# Patient Record
Sex: MALE | Race: WHITE | NOT HISPANIC OR LATINO | URBAN - METROPOLITAN AREA
[De-identification: names, ages, dates, MRNs, and addresses within clinical notes are randomized per-mention and may not be internally consistent; named-entity substitution may affect disease eponyms.]

---

## 2019-06-22 ENCOUNTER — INPATIENT (INPATIENT)
Facility: HOSPITAL | Age: 84
LOS: 9 days | Discharge: NURSING, REHAB OR LONG-TERM CARE | End: 2019-07-02
Attending: FAMILY MEDICINE | Admitting: FAMILY MEDICINE
Payer: MEDICARE

## 2019-06-22 VITALS — TEMPERATURE: 103 F

## 2019-06-22 LAB
ALBUMIN SERPL ELPH-MCNC: 3.5 G/DL — SIGNIFICANT CHANGE UP (ref 3.3–5)
ALP SERPL-CCNC: 68 U/L — SIGNIFICANT CHANGE UP (ref 40–120)
ALT FLD-CCNC: 16 U/L — SIGNIFICANT CHANGE UP (ref 12–78)
ANION GAP SERPL CALC-SCNC: 9 MMOL/L — SIGNIFICANT CHANGE UP (ref 5–17)
APPEARANCE UR: CLEAR — SIGNIFICANT CHANGE UP
APTT BLD: 31.3 SEC — SIGNIFICANT CHANGE UP (ref 27.5–36.3)
AST SERPL-CCNC: 18 U/L — SIGNIFICANT CHANGE UP (ref 15–37)
BACTERIA # UR AUTO: ABNORMAL
BASOPHILS # BLD AUTO: 0.01 K/UL — SIGNIFICANT CHANGE UP (ref 0–0.2)
BASOPHILS NFR BLD AUTO: 0.1 % — SIGNIFICANT CHANGE UP (ref 0–2)
BILIRUB SERPL-MCNC: 0.8 MG/DL — SIGNIFICANT CHANGE UP (ref 0.2–1.2)
BILIRUB UR-MCNC: NEGATIVE — SIGNIFICANT CHANGE UP
BUN SERPL-MCNC: 21 MG/DL — SIGNIFICANT CHANGE UP (ref 7–23)
CALCIUM SERPL-MCNC: 9.3 MG/DL — SIGNIFICANT CHANGE UP (ref 8.5–10.1)
CHLORIDE SERPL-SCNC: 103 MMOL/L — SIGNIFICANT CHANGE UP (ref 96–108)
CO2 SERPL-SCNC: 26 MMOL/L — SIGNIFICANT CHANGE UP (ref 22–31)
COLOR SPEC: YELLOW — SIGNIFICANT CHANGE UP
CREAT SERPL-MCNC: 1.5 MG/DL — HIGH (ref 0.5–1.3)
DIFF PNL FLD: ABNORMAL
EOSINOPHIL # BLD AUTO: 0 K/UL — SIGNIFICANT CHANGE UP (ref 0–0.5)
EOSINOPHIL NFR BLD AUTO: 0 % — SIGNIFICANT CHANGE UP (ref 0–6)
EPI CELLS # UR: SIGNIFICANT CHANGE UP
GLUCOSE SERPL-MCNC: 123 MG/DL — HIGH (ref 70–99)
GLUCOSE UR QL: NEGATIVE MG/DL — SIGNIFICANT CHANGE UP
HCT VFR BLD CALC: 45.6 % — SIGNIFICANT CHANGE UP (ref 39–50)
HGB BLD-MCNC: 15.5 G/DL — SIGNIFICANT CHANGE UP (ref 13–17)
IMM GRANULOCYTES NFR BLD AUTO: 0.5 % — SIGNIFICANT CHANGE UP (ref 0–1.5)
INR BLD: 1.3 RATIO — HIGH (ref 0.88–1.16)
KETONES UR-MCNC: ABNORMAL
LACTATE SERPL-SCNC: 1.9 MMOL/L — SIGNIFICANT CHANGE UP (ref 0.7–2)
LEUKOCYTE ESTERASE UR-ACNC: NEGATIVE — SIGNIFICANT CHANGE UP
LYMPHOCYTES # BLD AUTO: 0.56 K/UL — LOW (ref 1–3.3)
LYMPHOCYTES # BLD AUTO: 4.4 % — LOW (ref 13–44)
MCHC RBC-ENTMCNC: 30.4 PG — SIGNIFICANT CHANGE UP (ref 27–34)
MCHC RBC-ENTMCNC: 34 GM/DL — SIGNIFICANT CHANGE UP (ref 32–36)
MCV RBC AUTO: 89.4 FL — SIGNIFICANT CHANGE UP (ref 80–100)
MONOCYTES # BLD AUTO: 0.81 K/UL — SIGNIFICANT CHANGE UP (ref 0–0.9)
MONOCYTES NFR BLD AUTO: 6.3 % — SIGNIFICANT CHANGE UP (ref 2–14)
NEUTROPHILS # BLD AUTO: 11.32 K/UL — HIGH (ref 1.8–7.4)
NEUTROPHILS NFR BLD AUTO: 88.7 % — HIGH (ref 43–77)
NITRITE UR-MCNC: NEGATIVE — SIGNIFICANT CHANGE UP
PH UR: 6 — SIGNIFICANT CHANGE UP (ref 5–8)
PLATELET # BLD AUTO: 207 K/UL — SIGNIFICANT CHANGE UP (ref 150–400)
POTASSIUM SERPL-MCNC: 3.1 MMOL/L — LOW (ref 3.5–5.3)
POTASSIUM SERPL-SCNC: 3.1 MMOL/L — LOW (ref 3.5–5.3)
PROT SERPL-MCNC: 8 GM/DL — SIGNIFICANT CHANGE UP (ref 6–8.3)
PROT UR-MCNC: 30 MG/DL
PROTHROM AB SERPL-ACNC: 14.6 SEC — HIGH (ref 10–12.9)
RBC # BLD: 5.1 M/UL — SIGNIFICANT CHANGE UP (ref 4.2–5.8)
RBC # FLD: 12.8 % — SIGNIFICANT CHANGE UP (ref 10.3–14.5)
RBC CASTS # UR COMP ASSIST: ABNORMAL /HPF (ref 0–4)
SODIUM SERPL-SCNC: 138 MMOL/L — SIGNIFICANT CHANGE UP (ref 135–145)
SP GR SPEC: 1.01 — SIGNIFICANT CHANGE UP (ref 1.01–1.02)
UROBILINOGEN FLD QL: 1 MG/DL
WBC # BLD: 12.76 K/UL — HIGH (ref 3.8–10.5)
WBC # FLD AUTO: 12.76 K/UL — HIGH (ref 3.8–10.5)
WBC UR QL: SIGNIFICANT CHANGE UP

## 2019-06-22 PROCEDURE — 93010 ELECTROCARDIOGRAM REPORT: CPT

## 2019-06-22 PROCEDURE — 74177 CT ABD & PELVIS W/CONTRAST: CPT | Mod: 26

## 2019-06-22 PROCEDURE — 72125 CT NECK SPINE W/O DYE: CPT | Mod: 26

## 2019-06-22 PROCEDURE — 99285 EMERGENCY DEPT VISIT HI MDM: CPT

## 2019-06-22 PROCEDURE — 71260 CT THORAX DX C+: CPT | Mod: 26

## 2019-06-22 PROCEDURE — 70450 CT HEAD/BRAIN W/O DYE: CPT | Mod: 26

## 2019-06-22 RX ORDER — SODIUM CHLORIDE 9 MG/ML
2400 INJECTION, SOLUTION INTRAVENOUS ONCE
Refills: 0 | Status: COMPLETED | OUTPATIENT
Start: 2019-06-22 | End: 2019-06-22

## 2019-06-22 RX ORDER — ACETAMINOPHEN 500 MG
650 TABLET ORAL ONCE
Refills: 0 | Status: COMPLETED | OUTPATIENT
Start: 2019-06-22 | End: 2019-06-22

## 2019-06-22 RX ORDER — VANCOMYCIN HCL 1 G
1000 VIAL (EA) INTRAVENOUS ONCE
Refills: 0 | Status: COMPLETED | OUTPATIENT
Start: 2019-06-22 | End: 2019-06-22

## 2019-06-22 RX ORDER — METOPROLOL TARTRATE 50 MG
50 TABLET ORAL ONCE
Refills: 0 | Status: DISCONTINUED | OUTPATIENT
Start: 2019-06-22 | End: 2019-06-23

## 2019-06-22 RX ORDER — CEFEPIME 1 G/1
2000 INJECTION, POWDER, FOR SOLUTION INTRAMUSCULAR; INTRAVENOUS ONCE
Refills: 0 | Status: COMPLETED | OUTPATIENT
Start: 2019-06-22 | End: 2019-06-22

## 2019-06-22 RX ADMIN — CEFEPIME 100 MILLIGRAM(S): 1 INJECTION, POWDER, FOR SOLUTION INTRAMUSCULAR; INTRAVENOUS at 15:25

## 2019-06-22 RX ADMIN — Medication 650 MILLIGRAM(S): at 14:50

## 2019-06-22 RX ADMIN — SODIUM CHLORIDE 2400 MILLILITER(S): 9 INJECTION, SOLUTION INTRAVENOUS at 15:24

## 2019-06-22 RX ADMIN — Medication 650 MILLIGRAM(S): at 15:36

## 2019-06-22 RX ADMIN — Medication 650 MILLIGRAM(S): at 21:38

## 2019-06-22 RX ADMIN — Medication 250 MILLIGRAM(S): at 16:56

## 2019-06-22 NOTE — ED PROVIDER NOTE - SKIN, MLM
No evidence of rash. +small left forehead sub tissue swelling with overlying superficial abrasions +tactile warmth

## 2019-06-22 NOTE — ED PROVIDER NOTE - MUSCULOSKELETAL, MLM
Spine appears normal, range of motion is not limited, no muscle or joint tenderness, +bilateral SLR 45 degrees without pain, normal motor

## 2019-06-22 NOTE — ED ADULT TRIAGE NOTE - CHIEF COMPLAINT QUOTE
Brought in by EMS for fall last night at 8pm and fall today at 1:30pm with head injury but no LOC. Patient on baby asa. Complaining of dizziness. Recent dx of sinus infection. Patient febrile in triage. trauma alert and code sepsis called in triage.

## 2019-06-22 NOTE — H&P ADULT - HISTORY OF PRESENT ILLNESS
84 y/o male with PMHx of HTN, BPH, diverticulosis, prostate CA, tubular adenoma presents to the ED s/p fall this morning. Per pt's daughter at bedside, pt fell last night and again this morning with +head injury. No LOC. Pt does not remember how he fell but believes he slipped and fell. Pt also states he has been feeling weak and fatigued x3 days, with bifrontal mild headache, diarrhea and decreased appetite/PO. Seen by PMD Dr. Paulino this morning PTA who dx pt with a sinus infection. At ED, rectal temp 103. Denies neck pain, back pain, bilateral lower extremities pain. No hx of renal problems. On 81mg every other day. Former smoker.     Fam Hx:  Father  at 70 , Multiple Myeloma  Mother  at 91, Dementia Pt is an 84 y/o male with PMHx of HTN, BPH, diverticulosis, prostate CA, tubular adenoma who presents to the ED via EMS after a recurrent  fall this afternoon.    Pts reports he fell at 6pm last night and again today at 1pm while he was trying to urinate in the bathroom.  He denies LOC and reports chills, extreme weakness  and fatigue for a few  days, also assoc  bifrontal mild headache, 1-2 loose stool diarrhea and decreased appetite/PO intake. Pt saw Dr. Nik Zimmerman at 10 am this morning ,  and was prescribed cefuroxime for  chest congestion and  a sinus infection.     In the ED, pt was noted to be febrile with a rectal temp 103F.  He  denied neck pain, back pain, bilateral lower extremities pain. He reports 1 week of a non-productive cough.  No SOB or chest pain.  No hx of renal problems.  On 81mg every other day.      In the ED, he received Sepsis antibiotics with 2gm Cefepime and Vanco 1gm,  and 2400 ml of LR, 650mg tylenol.    Fam Hx:  Father  at 70 , Multiple Myeloma  Mother  at 91, Dementia

## 2019-06-22 NOTE — H&P ADULT - NSHPLABSRESULTS_GEN_ALL_CORE
< from: CT Abdomen and Pelvis w/ IV Cont (06.22.19 @ 16:37) >    EXAM:  CT ABDOMEN AND PELVIS IC                          EXAM:  CT CHEST IC                            PROCEDURE DATE:  06/22/2019          INTERPRETATION:  CT CHEST WITH IV CONTRAST, CT ABDOMEN AND PELVIS WITH IV   CONTRAST    CLINICAL INFORMATION: Trauma Alert:  fall on ASA      COMPARISON: None.    PROCEDURE:   CT of the Chest, Abdomen and Pelvis was performed with intravenous   contrast.   Imaging was performed through the chest in the arterial phase followed by   imaging of the abdomen andpelvis in the portal venous phase.  Oral contrast:None.  Sagittal and coronal reformats were performed.    Contrast: 90 cc Omnipaque 350    FINDINGS:    CHEST:     LUNGS, AIRWAYS: The central airways are patent. Posterior right upper   lobe consolidation.  PLEURA: No pleural effusion, hemothorax, or pneumothorax.  VESSELS: No acute aortic injury. Main pulmonary arteries are patent.  HEART: Normal heart size. No pericardial effusion. Coronary artery   calcifications are present.  MEDIASTINUM AND VALERIY: No adenopathy or hematoma.  CHEST WALL: No hematoma.    ABDOMEN AND PELVIS:    LIVER: No hematoma or laceration.  BILE DUCTS: Nondilated.  GALLBLADDER: Normal.  SPLEEN: No hematoma or laceration.  PANCREAS: Normal.  ADRENALS: Normal.  KIDNEYS/URETERS: No hematoma, laceration, or perinephric fluid/fluid   collection. 1.2 cm indeterminate left renal lesion.    BLADDER: Normal.  REPRODUCTIVE ORGANS: Radiation seeds.    BOWEL: No bowel-related abnormality.  PERITONEUM: No free air, ascites, or hemoperitoneum.  VESSELS:  Normal caliber aorta.  RETROPERITONEUM: No adenopathy or hematoma.    ABDOMINAL WALL: No hematoma or contusion.  BONES: No acute skeletal trauma. No displaced rib fracture. No   thoracolumbar spine compression fracture or traumatic malalignment.   Age-indeterminate mild superior endplate compression fracture at T12.    IMPRESSION:     No acute traumatic injury.     Age-indeterminate mild superior endplate compression fracture at T12.    Posterior right upper lobe consolidation, likely pneumonia. Follow-up to   complete resolution.    1.2 cm indeterminate left renal lesion. Follow-up nonemergent MRI abdomen   with and without contrast.    ALFREDITO ESCALANTE   This document has been electronically signed. Jun 22 2019  5:36PM    < end of copied text >

## 2019-06-22 NOTE — H&P ADULT - NSHPPHYSICALEXAM_GEN_ALL_CORE
ICU Vital Signs Last 24 Hrs    T(F): 99.9 (22 Jun 2019 20:35), Max: 103 (22 Jun 2019 14:30)  HR: 84 (22 Jun 2019 20:35) (78 - 94)  BP: 172/87 (22 Jun 2019 20:35) (128/63 - 172/87)    RR: 19 (22 Jun 2019 20:35) (17 - 19)  SpO2: 98% (22 Jun 2019 20:35) (95% - 98%)

## 2019-06-22 NOTE — ED PROVIDER NOTE - CARE PLAN
Principal Discharge DX:	CAP (community acquired pneumonia)  Secondary Diagnosis:	Injury of head, initial encounter  Secondary Diagnosis:	Falls, initial encounter

## 2019-06-22 NOTE — ED PROVIDER NOTE - OBJECTIVE STATEMENT
82 y/o male with PMHx of HTN, BPH, diverticulosis, prostate CA, tubular adenoma presents to the ED s/p fall this morning. Per pt's daughter at bedside, pt fell last night and again this morning with +head injury. No LOC. Pt does not remember how he fell but believes he slipped and fell. Pt also states he has been feeling weak and fatigued x3 days, with bifrontal mild headache, diarrhea and decreased appetite/PO. Seen by PMD Dr. Paulino this morning PTA who dx pt with a sinus infection. At [facility], rectal temp 103. Denies neck pain, back pain, bilateral lower extremities pain. No hx of renal problems. On 81mg every other day. Former smoker. PMD: .

## 2019-06-22 NOTE — ED PROVIDER NOTE - PMH
BPH (Benign Prostatic Hyperplasia)    Deviated nasal septum    Diverticulosis  sigmoid  History of atypical nevus    History of prostate cancer  seed implant 01/2004  HTN (hypertension)    Rhinitis    Sensorineural hearing loss  bilateral  Tubular adenoma

## 2019-06-22 NOTE — H&P ADULT - NSICDXPASTSURGICALHX_GEN_ALL_CORE_FT
PAST SURGICAL HISTORY:  History of rhinoplasty     History of tonsillectomy     Mastoid disorder     Prostate ca with seed implantation 1/2004    S/P cataract surgery left eye

## 2019-06-22 NOTE — H&P ADULT - ASSESSMENT
Pt is admitted w/    Fever 103 F, rectally  RUL Pna  Weakness  Fall  Abd Distension  Overflow Incontinence/Urinary Urgency  - s/p [id number], Vanco 1gm in the ED, cont  - s/p 2400 LR in the ED  - 650mg tylenol  - follow blood and sputum cx  - bedside bladder scan to obtain post-void residual  - ID consult  - DVT prophylaxis : heparin  - IMPROVE VTE Individual Risk Assessment    RISK                                                                Points    [  ] Previous VTE                                                  3    [  ] Thrombophilia                                               2    [  ] Lower limb paralysis                                      2        (unable to hold up >15 seconds)      [  ] Current Cancer                                              2         (within 6 months)    [  ] Immobilization > 24 hrs                                1    [  ] ICU/CCU stay > 24 hours                              1    [  X] Age > 60                                                      1    IMPROVE VTE Score ____1_____    IMPROVE Score 0-1: Low Risk, No VTE prophylaxis required for most patients, encourage ambulation.   IMPROVE Score 2-3: At risk, pharmacologic VTE prophylaxis is indicated for most patients (in the absence of a contraindication)  IMPROVE Score > or = 4: High Risk, pharmacologic VTE prophylaxis is indicated for most patients (in the absence of a contraindication) Pt is admitted w/    Fever 103 F, rectally  RUL Pna  Weakness  Fall  Abd Distension  Overflow Incontinence/Urinary Urgency  1.2 cm indeterminate left renal lesion.  - s/p [id number], Vanco 1gm in the ED, cont with Comm Aqc Pna coverage: rocephin, zithromax  - s/p 2400 LR in the ED  - 650mg tylenol  - follow blood and sputum cx  - bedside bladder scan to obtain post-void residual shows <150ml urine  - ID consult  - 1.2 cm indeterminate left renal lesion on CT abd. Follow-up nonemergent MRI abdomen   with and without contrast.  - DVT prophylaxis : heparin  - IMPROVE VTE Individual Risk Assessment    RISK                                                                Points    [  ] Previous VTE                                                  3    [  ] Thrombophilia                                               2    [  ] Lower limb paralysis                                      2        (unable to hold up >15 seconds)      [  ] Current Cancer                                              2         (within 6 months)    [  ] Immobilization > 24 hrs                                1    [  ] ICU/CCU stay > 24 hours                              1    [  X] Age > 60                                                      1    IMPROVE VTE Score ____1_____    IMPROVE Score 0-1: Low Risk, No VTE prophylaxis required for most patients, encourage ambulation.   IMPROVE Score 2-3: At risk, pharmacologic VTE prophylaxis is indicated for most patients (in the absence of a contraindication)  IMPROVE Score > or = 4: High Risk, pharmacologic VTE prophylaxis is indicated for most patients (in the absence of a contraindication)  - Full Code, pts wife is his HCP

## 2019-06-22 NOTE — ED ADULT NURSE NOTE - NSIMPLEMENTINTERV_GEN_ALL_ED
Implemented All Fall Risk Interventions:  [place] to call system. Call bell, personal items and telephone within reach. Instruct patient to call for assistance. Room bathroom lighting operational. Non-slip footwear when patient is off stretcher. Physically safe environment: no spills, clutter or unnecessary equipment. Stretcher in lowest position, wheels locked, appropriate side rails in place. Provide visual cue, wrist band, yellow gown, etc. Monitor gait and stability. Monitor for mental status changes and reorient to person, place, and time. Review medications for side effects contributing to fall risk. Reinforce activity limits and safety measures with patient and family.

## 2019-06-22 NOTE — ED ADULT NURSE NOTE - OBJECTIVE STATEMENT
Pt presents to ED for multiple fall.s     Brought in by EMS for fall last night at 8pm and fall today at 1:30pm with head injury but no LOC. Patient on baby asa. Complaining of dizziness. Recent dx of sinus infection. Patient febrile in triage. trauma alert and code sepsis called in triage.

## 2019-06-22 NOTE — H&P ADULT - NSICDXPASTMEDICALHX_GEN_ALL_CORE_FT
PAST MEDICAL HISTORY:  BPH (Benign Prostatic Hyperplasia)     Deviated nasal septum     Diverticulosis sigmoid    History of atypical nevus     History of prostate cancer seed implant 01/2004    HTN (hypertension)     Rhinitis     Sensorineural hearing loss bilateral    Tubular adenoma

## 2019-06-22 NOTE — ED PROVIDER NOTE - CLINICAL SUMMARY MEDICAL DECISION MAKING FREE TEXT BOX
82 y/o male +diarrhea past 3+ days, ?sinusitis, BIBA from home s/p fall last night and again this AM. +generalized weakness, +hit head on baby ASA, no LOC. Febrile upon ED arrival. Trauma/sepsis alert initiated: PAN scan, EKG, labs including PAN culture, serum lactate, sepsis IV fluids, IV Vanco/Cefepime, BGM, monitor, observe, reassess.

## 2019-06-22 NOTE — H&P ADULT - NSHPSOCIALHISTORY_GEN_ALL_CORE
No tob/ETOH/drug use. Pt lives with his wife.  No ETOH/drug use.    He is a former 10 pack year smoker, who quit 50 years ago.  Pt is a retired .

## 2019-06-23 LAB
ALBUMIN SERPL ELPH-MCNC: 2.7 G/DL — LOW (ref 3.3–5)
ALP SERPL-CCNC: 49 U/L — SIGNIFICANT CHANGE UP (ref 40–120)
ALT FLD-CCNC: 16 U/L — SIGNIFICANT CHANGE UP (ref 12–78)
ANION GAP SERPL CALC-SCNC: 8 MMOL/L — SIGNIFICANT CHANGE UP (ref 5–17)
AST SERPL-CCNC: 28 U/L — SIGNIFICANT CHANGE UP (ref 15–37)
BILIRUB SERPL-MCNC: 0.5 MG/DL — SIGNIFICANT CHANGE UP (ref 0.2–1.2)
BUN SERPL-MCNC: 25 MG/DL — HIGH (ref 7–23)
CALCIUM SERPL-MCNC: 8.4 MG/DL — LOW (ref 8.5–10.1)
CHLORIDE SERPL-SCNC: 105 MMOL/L — SIGNIFICANT CHANGE UP (ref 96–108)
CO2 SERPL-SCNC: 26 MMOL/L — SIGNIFICANT CHANGE UP (ref 22–31)
CREAT SERPL-MCNC: 1.39 MG/DL — HIGH (ref 0.5–1.3)
CULTURE RESULTS: NO GROWTH — SIGNIFICANT CHANGE UP
GLUCOSE SERPL-MCNC: 110 MG/DL — HIGH (ref 70–99)
HCT VFR BLD CALC: 37.6 % — LOW (ref 39–50)
HGB BLD-MCNC: 12.8 G/DL — LOW (ref 13–17)
MCHC RBC-ENTMCNC: 30.3 PG — SIGNIFICANT CHANGE UP (ref 27–34)
MCHC RBC-ENTMCNC: 34 GM/DL — SIGNIFICANT CHANGE UP (ref 32–36)
MCV RBC AUTO: 89.1 FL — SIGNIFICANT CHANGE UP (ref 80–100)
PLATELET # BLD AUTO: 169 K/UL — SIGNIFICANT CHANGE UP (ref 150–400)
POTASSIUM SERPL-MCNC: 3 MMOL/L — LOW (ref 3.5–5.3)
POTASSIUM SERPL-SCNC: 3 MMOL/L — LOW (ref 3.5–5.3)
PROT SERPL-MCNC: 6.6 GM/DL — SIGNIFICANT CHANGE UP (ref 6–8.3)
RBC # BLD: 4.22 M/UL — SIGNIFICANT CHANGE UP (ref 4.2–5.8)
RBC # FLD: 13.2 % — SIGNIFICANT CHANGE UP (ref 10.3–14.5)
SODIUM SERPL-SCNC: 139 MMOL/L — SIGNIFICANT CHANGE UP (ref 135–145)
SPECIMEN SOURCE: SIGNIFICANT CHANGE UP
WBC # BLD: 9.6 K/UL — SIGNIFICANT CHANGE UP (ref 3.8–10.5)
WBC # FLD AUTO: 9.6 K/UL — SIGNIFICANT CHANGE UP (ref 3.8–10.5)

## 2019-06-23 RX ORDER — ACETAMINOPHEN 500 MG
325 TABLET ORAL ONCE
Refills: 0 | Status: COMPLETED | OUTPATIENT
Start: 2019-06-23 | End: 2019-06-23

## 2019-06-23 RX ORDER — ACETAMINOPHEN 500 MG
325 TABLET ORAL EVERY 4 HOURS
Refills: 0 | Status: DISCONTINUED | OUTPATIENT
Start: 2019-06-23 | End: 2019-06-24

## 2019-06-23 RX ORDER — SODIUM CHLORIDE 9 MG/ML
1000 INJECTION INTRAMUSCULAR; INTRAVENOUS; SUBCUTANEOUS
Refills: 0 | Status: DISCONTINUED | OUTPATIENT
Start: 2019-06-23 | End: 2019-06-24

## 2019-06-23 RX ORDER — METOPROLOL TARTRATE 50 MG
12.5 TABLET ORAL DAILY
Refills: 0 | Status: COMPLETED | OUTPATIENT
Start: 2019-06-23 | End: 2019-06-24

## 2019-06-23 RX ORDER — ACETAMINOPHEN 500 MG
650 TABLET ORAL ONCE
Refills: 0 | Status: COMPLETED | OUTPATIENT
Start: 2019-06-23 | End: 2019-06-23

## 2019-06-23 RX ORDER — METOPROLOL TARTRATE 50 MG
0 TABLET ORAL
Qty: 90 | Refills: 0 | DISCHARGE

## 2019-06-23 RX ORDER — DOXAZOSIN MESYLATE 4 MG
4 TABLET ORAL AT BEDTIME
Refills: 0 | Status: DISCONTINUED | OUTPATIENT
Start: 2019-06-23 | End: 2019-07-02

## 2019-06-23 RX ORDER — AZITHROMYCIN 500 MG/1
500 TABLET, FILM COATED ORAL ONCE
Refills: 0 | Status: COMPLETED | OUTPATIENT
Start: 2019-06-23 | End: 2019-06-23

## 2019-06-23 RX ORDER — CEFTRIAXONE 500 MG/1
1000 INJECTION, POWDER, FOR SOLUTION INTRAMUSCULAR; INTRAVENOUS EVERY 24 HOURS
Refills: 0 | Status: DISCONTINUED | OUTPATIENT
Start: 2019-06-23 | End: 2019-06-26

## 2019-06-23 RX ORDER — CEFTRIAXONE 500 MG/1
1000 INJECTION, POWDER, FOR SOLUTION INTRAMUSCULAR; INTRAVENOUS EVERY 24 HOURS
Refills: 0 | Status: DISCONTINUED | OUTPATIENT
Start: 2019-06-23 | End: 2019-06-23

## 2019-06-23 RX ORDER — DOXAZOSIN MESYLATE 4 MG
0 TABLET ORAL
Qty: 90 | Refills: 0 | DISCHARGE

## 2019-06-23 RX ORDER — AZITHROMYCIN 500 MG/1
500 TABLET, FILM COATED ORAL EVERY 24 HOURS
Refills: 0 | Status: DISCONTINUED | OUTPATIENT
Start: 2019-06-24 | End: 2019-06-27

## 2019-06-23 RX ORDER — HEPARIN SODIUM 5000 [USP'U]/ML
5000 INJECTION INTRAVENOUS; SUBCUTANEOUS EVERY 8 HOURS
Refills: 0 | Status: DISCONTINUED | OUTPATIENT
Start: 2019-06-22 | End: 2019-06-26

## 2019-06-23 RX ORDER — SODIUM CHLORIDE 9 MG/ML
1000 INJECTION INTRAMUSCULAR; INTRAVENOUS; SUBCUTANEOUS
Refills: 0 | Status: DISCONTINUED | OUTPATIENT
Start: 2019-06-22 | End: 2019-06-23

## 2019-06-23 RX ORDER — ASPIRIN/CALCIUM CARB/MAGNESIUM 324 MG
1 TABLET ORAL
Qty: 0 | Refills: 0 | DISCHARGE
Start: 2019-06-23

## 2019-06-23 RX ORDER — ASPIRIN/CALCIUM CARB/MAGNESIUM 324 MG
81 TABLET ORAL DAILY
Refills: 0 | Status: DISCONTINUED | OUTPATIENT
Start: 2019-06-23 | End: 2019-07-02

## 2019-06-23 RX ORDER — AZITHROMYCIN 500 MG/1
TABLET, FILM COATED ORAL
Refills: 0 | Status: DISCONTINUED | OUTPATIENT
Start: 2019-06-23 | End: 2019-06-27

## 2019-06-23 RX ORDER — POTASSIUM CHLORIDE 20 MEQ
40 PACKET (EA) ORAL EVERY 4 HOURS
Refills: 0 | Status: COMPLETED | OUTPATIENT
Start: 2019-06-23 | End: 2019-06-23

## 2019-06-23 RX ADMIN — Medication 325 MILLIGRAM(S): at 22:29

## 2019-06-23 RX ADMIN — AZITHROMYCIN 255 MILLIGRAM(S): 500 TABLET, FILM COATED ORAL at 01:06

## 2019-06-23 RX ADMIN — Medication 4 MILLIGRAM(S): at 21:06

## 2019-06-23 RX ADMIN — CEFTRIAXONE 1000 MILLIGRAM(S): 500 INJECTION, POWDER, FOR SOLUTION INTRAMUSCULAR; INTRAVENOUS at 01:06

## 2019-06-23 RX ADMIN — Medication 12.5 MILLIGRAM(S): at 12:25

## 2019-06-23 RX ADMIN — HEPARIN SODIUM 5000 UNIT(S): 5000 INJECTION INTRAVENOUS; SUBCUTANEOUS at 05:41

## 2019-06-23 RX ADMIN — Medication 40 MILLIEQUIVALENT(S): at 18:16

## 2019-06-23 RX ADMIN — SODIUM CHLORIDE 75 MILLILITER(S): 9 INJECTION INTRAMUSCULAR; INTRAVENOUS; SUBCUTANEOUS at 01:00

## 2019-06-23 RX ADMIN — Medication 81 MILLIGRAM(S): at 12:25

## 2019-06-23 RX ADMIN — HEPARIN SODIUM 5000 UNIT(S): 5000 INJECTION INTRAVENOUS; SUBCUTANEOUS at 12:28

## 2019-06-23 RX ADMIN — HEPARIN SODIUM 5000 UNIT(S): 5000 INJECTION INTRAVENOUS; SUBCUTANEOUS at 21:06

## 2019-06-23 RX ADMIN — Medication 40 MILLIEQUIVALENT(S): at 21:06

## 2019-06-23 RX ADMIN — Medication 325 MILLIGRAM(S): at 18:16

## 2019-06-23 RX ADMIN — Medication 325 MILLIGRAM(S): at 16:03

## 2019-06-23 RX ADMIN — Medication 650 MILLIGRAM(S): at 12:23

## 2019-06-23 RX ADMIN — SODIUM CHLORIDE 75 MILLILITER(S): 9 INJECTION INTRAMUSCULAR; INTRAVENOUS; SUBCUTANEOUS at 12:23

## 2019-06-23 NOTE — CONSULT NOTE ADULT - ASSESSMENT
84 y/o male with h/o HTN, BPH, diverticulosis, prostate CA, tubular adenoma was admitted on 6/22 for increased weakness and recurrent  falls. Pts reports he fell at 6pm the night PTA and again the next day while he was trying to urinate in the bathroom.  He denies LOC, but reports chills, extreme weakness and fatigue for a few  days, also assoc bifrontal mild headache, 1-2 loose stools and decreased appetite/PO intake. Pt saw Dr. Nik Zimmerman earlier on the day of admission and was prescribed cefuroxime for chest congestion and  a sinus infection. In ER he was noted febrile with a rectal temp 103F. He received cefepime and Vanco 1gm, then ceftriaxone and azithromycin.    1. Febrile syndrome. Possible sepsis. RUL pneumonia. CRF stage 3. ?ARF.  -leukocytosis  -obtain BC x 2, sputum c/s  -agree with ceftriaxone 1 gm IV qd and azithromycin 500 mg IV qd  -reason for abx use and side effects reviewed with patient; monitor BMP   -old chart reviewed to assess prior cultures  -monitor temps  -f/u CBC  -supportive care  2. Other issues:   -care per medicine 82 y/o male with h/o HTN, BPH, diverticulosis, prostate CA, tubular adenoma was admitted on 6/22 for increased weakness and recurrent  falls. Pts reports he fell at 6pm the night PTA and again the next day while he was trying to urinate in the bathroom.  He denies LOC, but reports chills, extreme weakness and fatigue for a few  days, also assoc bifrontal mild headache, 1-2 loose stools and decreased appetite/PO intake. Pt saw Dr. Nik Zimmerman earlier on the day of admission and was prescribed cefuroxime for chest congestion and  a sinus infection. In ER he was noted febrile with a rectal temp 103F. He received cefepime and Vanco 1gm, then ceftriaxone and azithromycin.    1. Febrile syndrome. Possible sepsis. RUL pneumonia. CRF stage 3. ?ARF.  -leukocytosis  -obtain BC x 2, sputum c/s  -agree with ceftriaxone 1 gm IV qd and azithromycin 500 mg IV qd  -reason for abx use and side effects reviewed with patient; monitor BMP   -old chart reviewed to assess prior cultures  -hydration  -monitor temps  -f/u CBC  -supportive care  2. Other issues:   -care per medicine

## 2019-06-23 NOTE — PROGRESS NOTE ADULT - ASSESSMENT
82 y/o male with PMHx of HTN, BPH, diverticulosis, prostate CA, tubular adenoma who presents to the ED via EMS after a   fall- preceeding the fall he had been feeling fatigued and weak for a few days and also reported 1-2 loose stools presents with weakness and fall due to pneumonia    # Fever due to  right upper lobe pneumonia ( right upper lobe consolidation)   - chest CT reviewed  - received one dose of vancomycin and cefepime in ED  -received 2400 LR in ED  - Crackles at lung bases  - gentle hydration 75 ml/ hr for 14 hours  - Tylenol for fever prn  - continue ceftriaxone and azithromycin  -f/u sputum and blood c/s    # Hypertension  - DASH diet  - continue toprol 12.5 mg daily       # BIJAL (1.5 )on CKD- baseline unknown  - gentle hydration 75 ml/ hr for 14 hours then stop  - trend serum Cr  - avoid nephrotoxic medications    # compression fracture at T12  -  age indeterminate- neurologically intact and no acute symptoms  - follow up as outpatient       # 1.2 cm indeterminate left renal lesion  - urology consult    # Improve VTE score 1  - encourage ambulati

## 2019-06-24 LAB
ALBUMIN SERPL ELPH-MCNC: 2.7 G/DL — LOW (ref 3.3–5)
ALP SERPL-CCNC: 52 U/L — SIGNIFICANT CHANGE UP (ref 40–120)
ALT FLD-CCNC: 24 U/L — SIGNIFICANT CHANGE UP (ref 12–78)
ANION GAP SERPL CALC-SCNC: 9 MMOL/L — SIGNIFICANT CHANGE UP (ref 5–17)
AST SERPL-CCNC: 47 U/L — HIGH (ref 15–37)
BASOPHILS # BLD AUTO: 0.01 K/UL — SIGNIFICANT CHANGE UP (ref 0–0.2)
BASOPHILS NFR BLD AUTO: 0.1 % — SIGNIFICANT CHANGE UP (ref 0–2)
BILIRUB SERPL-MCNC: 0.6 MG/DL — SIGNIFICANT CHANGE UP (ref 0.2–1.2)
BUN SERPL-MCNC: 20 MG/DL — SIGNIFICANT CHANGE UP (ref 7–23)
CALCIUM SERPL-MCNC: 8.9 MG/DL — SIGNIFICANT CHANGE UP (ref 8.5–10.1)
CHLORIDE SERPL-SCNC: 107 MMOL/L — SIGNIFICANT CHANGE UP (ref 96–108)
CO2 SERPL-SCNC: 25 MMOL/L — SIGNIFICANT CHANGE UP (ref 22–31)
CREAT SERPL-MCNC: 1.18 MG/DL — SIGNIFICANT CHANGE UP (ref 0.5–1.3)
EOSINOPHIL # BLD AUTO: 0 K/UL — SIGNIFICANT CHANGE UP (ref 0–0.5)
EOSINOPHIL NFR BLD AUTO: 0 % — SIGNIFICANT CHANGE UP (ref 0–6)
GLUCOSE SERPL-MCNC: 105 MG/DL — HIGH (ref 70–99)
HCT VFR BLD CALC: 41.3 % — SIGNIFICANT CHANGE UP (ref 39–50)
HGB BLD-MCNC: 13.8 G/DL — SIGNIFICANT CHANGE UP (ref 13–17)
IMM GRANULOCYTES NFR BLD AUTO: 0.4 % — SIGNIFICANT CHANGE UP (ref 0–1.5)
LEGIONELLA AG UR QL: NEGATIVE — SIGNIFICANT CHANGE UP
LYMPHOCYTES # BLD AUTO: 0.64 K/UL — LOW (ref 1–3.3)
LYMPHOCYTES # BLD AUTO: 7.9 % — LOW (ref 13–44)
MCHC RBC-ENTMCNC: 29.9 PG — SIGNIFICANT CHANGE UP (ref 27–34)
MCHC RBC-ENTMCNC: 33.4 GM/DL — SIGNIFICANT CHANGE UP (ref 32–36)
MCV RBC AUTO: 89.4 FL — SIGNIFICANT CHANGE UP (ref 80–100)
MONOCYTES # BLD AUTO: 0.63 K/UL — SIGNIFICANT CHANGE UP (ref 0–0.9)
MONOCYTES NFR BLD AUTO: 7.7 % — SIGNIFICANT CHANGE UP (ref 2–14)
NEUTROPHILS # BLD AUTO: 6.82 K/UL — SIGNIFICANT CHANGE UP (ref 1.8–7.4)
NEUTROPHILS NFR BLD AUTO: 83.9 % — HIGH (ref 43–77)
PLATELET # BLD AUTO: 177 K/UL — SIGNIFICANT CHANGE UP (ref 150–400)
POTASSIUM SERPL-MCNC: 3.5 MMOL/L — SIGNIFICANT CHANGE UP (ref 3.5–5.3)
POTASSIUM SERPL-SCNC: 3.5 MMOL/L — SIGNIFICANT CHANGE UP (ref 3.5–5.3)
PROT SERPL-MCNC: 7.3 GM/DL — SIGNIFICANT CHANGE UP (ref 6–8.3)
RBC # BLD: 4.62 M/UL — SIGNIFICANT CHANGE UP (ref 4.2–5.8)
RBC # FLD: 13.4 % — SIGNIFICANT CHANGE UP (ref 10.3–14.5)
SODIUM SERPL-SCNC: 141 MMOL/L — SIGNIFICANT CHANGE UP (ref 135–145)
WBC # BLD: 8.13 K/UL — SIGNIFICANT CHANGE UP (ref 3.8–10.5)
WBC # FLD AUTO: 8.13 K/UL — SIGNIFICANT CHANGE UP (ref 3.8–10.5)

## 2019-06-24 PROCEDURE — 71045 X-RAY EXAM CHEST 1 VIEW: CPT | Mod: 26

## 2019-06-24 RX ORDER — HYDRALAZINE HCL 50 MG
5 TABLET ORAL ONCE
Refills: 0 | Status: COMPLETED | OUTPATIENT
Start: 2019-06-24 | End: 2019-06-24

## 2019-06-24 RX ORDER — METOPROLOL TARTRATE 50 MG
12.5 TABLET ORAL ONCE
Refills: 0 | Status: COMPLETED | OUTPATIENT
Start: 2019-06-24 | End: 2019-06-24

## 2019-06-24 RX ORDER — HYDRALAZINE HCL 50 MG
5 TABLET ORAL
Refills: 0 | Status: DISCONTINUED | OUTPATIENT
Start: 2019-06-24 | End: 2019-07-01

## 2019-06-24 RX ORDER — ACETAMINOPHEN 500 MG
650 TABLET ORAL ONCE
Refills: 0 | Status: COMPLETED | OUTPATIENT
Start: 2019-06-24 | End: 2019-06-24

## 2019-06-24 RX ORDER — ACETAMINOPHEN 500 MG
650 TABLET ORAL EVERY 6 HOURS
Refills: 0 | Status: DISCONTINUED | OUTPATIENT
Start: 2019-06-24 | End: 2019-07-02

## 2019-06-24 RX ORDER — METOPROLOL TARTRATE 50 MG
25 TABLET ORAL DAILY
Refills: 0 | Status: DISCONTINUED | OUTPATIENT
Start: 2019-06-25 | End: 2019-06-29

## 2019-06-24 RX ORDER — SODIUM CHLORIDE 9 MG/ML
1000 INJECTION INTRAMUSCULAR; INTRAVENOUS; SUBCUTANEOUS
Refills: 0 | Status: DISCONTINUED | OUTPATIENT
Start: 2019-06-24 | End: 2019-06-27

## 2019-06-24 RX ORDER — SODIUM CHLORIDE 9 MG/ML
1000 INJECTION INTRAMUSCULAR; INTRAVENOUS; SUBCUTANEOUS
Refills: 0 | Status: DISCONTINUED | OUTPATIENT
Start: 2019-06-24 | End: 2019-06-24

## 2019-06-24 RX ADMIN — Medication 325 MILLIGRAM(S): at 12:50

## 2019-06-24 RX ADMIN — Medication 12.5 MILLIGRAM(S): at 05:27

## 2019-06-24 RX ADMIN — Medication 650 MILLIGRAM(S): at 16:34

## 2019-06-24 RX ADMIN — HEPARIN SODIUM 5000 UNIT(S): 5000 INJECTION INTRAVENOUS; SUBCUTANEOUS at 05:27

## 2019-06-24 RX ADMIN — AZITHROMYCIN 255 MILLIGRAM(S): 500 TABLET, FILM COATED ORAL at 00:30

## 2019-06-24 RX ADMIN — Medication 81 MILLIGRAM(S): at 12:52

## 2019-06-24 RX ADMIN — Medication 12.5 MILLIGRAM(S): at 08:15

## 2019-06-24 RX ADMIN — AZITHROMYCIN 255 MILLIGRAM(S): 500 TABLET, FILM COATED ORAL at 23:02

## 2019-06-24 RX ADMIN — Medication 4 MILLIGRAM(S): at 21:27

## 2019-06-24 RX ADMIN — Medication 650 MILLIGRAM(S): at 00:54

## 2019-06-24 RX ADMIN — SODIUM CHLORIDE 50 MILLILITER(S): 9 INJECTION INTRAMUSCULAR; INTRAVENOUS; SUBCUTANEOUS at 16:06

## 2019-06-24 RX ADMIN — HEPARIN SODIUM 5000 UNIT(S): 5000 INJECTION INTRAVENOUS; SUBCUTANEOUS at 16:06

## 2019-06-24 RX ADMIN — HEPARIN SODIUM 5000 UNIT(S): 5000 INJECTION INTRAVENOUS; SUBCUTANEOUS at 21:26

## 2019-06-24 RX ADMIN — CEFTRIAXONE 1000 MILLIGRAM(S): 500 INJECTION, POWDER, FOR SOLUTION INTRAMUSCULAR; INTRAVENOUS at 00:30

## 2019-06-24 NOTE — PROGRESS NOTE ADULT - ASSESSMENT
84 y/o male with h/o HTN, BPH, diverticulosis, prostate CA, tubular adenoma was admitted on 6/22 for increased weakness and recurrent  falls. Pts reports he fell at 6pm the night PTA and again the next day while he was trying to urinate in the bathroom.  He denies LOC, but reports chills, extreme weakness and fatigue for a few  days, also assoc bifrontal mild headache, 1-2 loose stools and decreased appetite/PO intake. Pt saw Dr. Nik Zimmerman earlier on the day of admission and was prescribed cefuroxime for chest congestion and  a sinus infection. In ER he was noted febrile with a rectal temp 103F. He received cefepime and Vanco 1gm, then ceftriaxone and azithromycin.    1. Febrile syndrome. Possible sepsis. RUL pneumonia. CRF stage 3. ?ARF.  -leukocytosis is improved  -renal function improving  - BC x 2 reviewed  -on ceftriaxone 1 gm IV qd and azithromycin 500 mg IV qd # 2  -tolerating abx well so far; no side effects noted  -continue abx coverage  -hydration  -monitor temps  -f/u CBC  -supportive care  2. Other issues:   -care per medicine

## 2019-06-24 NOTE — PROGRESS NOTE ADULT - ASSESSMENT
83M w/ PMHx of HTN, prostate cancer, BPH, tubular adenoma, and diverticulosis who presented w/ recurrent falls admitted for sepsis secondary to PNA.      #Sepsis 2/2 PNA   - likely bacterial CAP  - still having fevers  - CT: RUL consolidation  - ID recs appreciated: continue ceftriaxone and azithromycin (D2)  - continue tylenol for fever  - urine culture negative  - blood culture: NGTD  - encourage PO intake  - f/u sputum culture, blood cx, legionella, stool O&P       #HTN  - DASH diet  - increase toprol to home 25 mg daily   - hold valsartan due to BIJAL  - may give hydralazine PRN     #BIJAL  - Cr 1.5 on admission, baseline unknown  - improving s/p IV fluids  - Hold valsartan   - avoid nephrotoxic meds    #1.2cm incidental left renal lesion  - f/u urology     #DVT PPx  - Improve Score 1  - encourage ambulation

## 2019-06-24 NOTE — PHYSICAL THERAPY INITIAL EVALUATION ADULT - PLANNED THERAPY INTERVENTIONS, PT EVAL
gait training/Stair training. chuy Barnett, transfers x 15'./transfer training/bed mobility training

## 2019-06-24 NOTE — PHYSICAL THERAPY INITIAL EVALUATION ADULT - PERTINENT HX OF CURRENT PROBLEM, REHAB EVAL
Pt admitted to  secondary to multiple falls. Pt also reports chills, weakness, fatigue, HA a few days PTA. CT head: neg, CT c-spine: neg for fx, DDD. CT abd/pelvis: no acute injury. Age indeterminate compression fx T12, 1.2 cm left renal lesion.

## 2019-06-24 NOTE — PHYSICAL THERAPY INITIAL EVALUATION ADULT - ACTIVE RANGE OF MOTION EXAMINATION, REHAB EVAL
Right UE Active ROM was WFL (within functional limits)/Right LE Active ROM was WFL (within functional limits)/Left UE Active ROM was WFL (within functional limits)/Left LE Active ROM was WFL (within functional limits)

## 2019-06-24 NOTE — PHYSICAL THERAPY INITIAL EVALUATION ADULT - GENERAL OBSERVATIONS, REHAB EVAL
Pt found supine in bed with cooling blanket and bed alarm activated. Pt with no c/o pain. Pt does c/o chills and feeling cold.

## 2019-06-24 NOTE — CONSULT NOTE ADULT - SUBJECTIVE AND OBJECTIVE BOX
UROLOGY CONSULTATION    DEMETRIUS LINDSEY  [id number]      HPI  Patient is a 83y yo Male who presented for malaise, fevers and found to have pneumonia    Urology consult requested for left renal lesion    Patient seen and examined at bedside.   He has a history of prostate cancer s/p brachytherapy ~2001  Prior care with Dr Prabhjot Rodriguez, has not been seen in a while    Taking flomax for BPH symptoms  Endorses weak stream, incomplete emptying prior to hospitalization    Current complaints: fevers    Denies dysuria, urgency, frequency, gross hematuria, fevers, chills, nausea, vomiting or weight loss    ROS  12 point ROS negative except that outlined in HPI    PMHX  COMMUNITY ACQUIRED, INJURY OF HEAD, FALLS  Diverticulosis  Tubular adenoma  BPH (Benign Prostatic Hyperplasia)  History of prostate cancer  HTN (hypertension)  Sensorineural hearing loss  Rhinitis  Deviated nasal septum  History of atypical nevus  S/P cataract surgery  Prostate ca  Mastoid disorder  History of tonsillectomy  History of rhinoplasty        MEDS  acetaminophen   Tablet .. 650 milliGRAM(s) Oral every 6 hours PRN  aspirin enteric coated 81 milliGRAM(s) Oral daily  azithromycin  IVPB 500 milliGRAM(s) IV Intermittent every 24 hours  azithromycin  IVPB      cefTRIAXone Injectable. 1000 milliGRAM(s) IV Push every 24 hours  doxazosin 4 milliGRAM(s) Oral at bedtime  heparin  Injectable 5000 Unit(s) SubCutaneous every 8 hours  hydrALAZINE Injectable 5 milliGRAM(s) IV Push every 1 hour PRN  metoprolol succinate ER 12.5 milliGRAM(s) Oral daily  sodium chloride 0.9%. 1000 milliLiter(s) IV Continuous <Continuous>      Allergies  adhesives (Rash)  No Known Drug Allergies        VITALS  T(C): 38.1 (06-24-19 @ 12:44), Max: 39.1 (06-23-19 @ 22:31)  HR: 90 (06-24-19 @ 10:38)  BP: 168/81 (06-24-19 @ 10:38)  RR: 18 (06-24-19 @ 10:38)  SpO2: 94% (06-24-19 @ 10:38)    06-23-19 @ 07:01  -  06-24-19 @ 07:00  --------------------------------------------------------  IN: 1200 mL / OUT: 0 mL / NET: 1200 mL    06-24-19 @ 07:01  -  06-24-19 @ 14:42  --------------------------------------------------------  IN: 0 mL / OUT: 200 mL / NET: -200 mL        Gen: elderly Male in NAD, well nourished  HEENT: normocephalic, anicteric sclera, moist mucus membranes; hearing intact  Chest: non labored breathing  Abd: soft, NT, ND, no masses  : no suprapubic distension or tenderness, normal phallus without lesions, bilateral testes without tenderness   Psych: AAOx3, normal affect & mood  Ext: moves all four extremities freely, no peripheral edema    LABS  06-24    141  |  107  |  20  ----------------------------<  105<H>  3.5   |  25  |  1.18    Ca    8.9      24 Jun 2019 08:34    TPro  7.3  /  Alb  2.7<L>  /  TBili  0.6  /  DBili  x   /  AST  47<H>  /  ALT  24  /  AlkPhos  52  06-24    CBC Full  -  ( 24 Jun 2019 08:34 )  WBC Count : 8.13 K/uL  Hemoglobin : 13.8 g/dL  Hematocrit : 41.3 %  Platelet Count - Automated : 177 K/uL  Mean Cell Volume : 89.4 fl  Mean Cell Hemoglobin : 29.9 pg  Mean Cell Hemoglobin Concentration : 33.4 gm/dL  Auto Neutrophil # : 6.82 K/uL  Auto Lymphocyte # : 0.64 K/uL  Auto Monocyte # : 0.63 K/uL  Auto Eosinophil # : 0.00 K/uL  Auto Basophil # : 0.01 K/uL  Auto Neutrophil % : 83.9 %  Auto Lymphocyte % : 7.9 %  Auto Monocyte % : 7.7 %  Auto Eosinophil % : 0.0 %  Auto Basophil % : 0.1 %      UA: 3-5 RBC  ucx: neg      Culture - Blood (collected 22 Jun 2019 14:55)  Source: .Blood Blood-Peripheral  Preliminary Report (23 Jun 2019 20:01):    No growth to date.    Culture - Urine (collected 22 Jun 2019 14:45)  Source: .Urine Clean Catch (Midstream)  Final Report (23 Jun 2019 12:17):    No growth    Culture - Blood (collected 22 Jun 2019 14:45)  Source: .Blood Blood-Peripheral  Preliminary Report (23 Jun 2019 20:01):    No growth to date.        RADIOLOGY  CT A.P: 1.2cm exophytic enhancing left renal lesion, renal cysts      ASSESSMENT & PLAN    Left renal lesion  - discussed with the patient differential diagnosis for solid renal masses. he understands that 80% are malignant however studies have shown that it is safe to monitor masses <2cm without intervention in elderly patients.   - Recommend renal US in 6 mos    Renal cysts  - no surgical intervention necessary    BPH with weak stream, incomplete emptying  - No UTI  - please obtain bladder scan to check PVR  - c/w flomax  - outpatient management  - If PVR>200mL then start fl;omax BID    Prostate cancer  - will check PSA as outpatient    Fevers are not from  source    f.u 2 weeks after discharge    Thank you for allowing me to participate in the care of this patient  Please call if there are questions or concerns    Kacy Hernández MD  [facility]  [phone]    06-24-19 @ 14:42

## 2019-06-25 DIAGNOSIS — J18.9 PNEUMONIA, UNSPECIFIED ORGANISM: ICD-10-CM

## 2019-06-25 DIAGNOSIS — I10 ESSENTIAL (PRIMARY) HYPERTENSION: ICD-10-CM

## 2019-06-25 DIAGNOSIS — R06.02 SHORTNESS OF BREATH: ICD-10-CM

## 2019-06-25 LAB
ANION GAP SERPL CALC-SCNC: 8 MMOL/L — SIGNIFICANT CHANGE UP (ref 5–17)
BUN SERPL-MCNC: 18 MG/DL — SIGNIFICANT CHANGE UP (ref 7–23)
CALCIUM SERPL-MCNC: 8.8 MG/DL — SIGNIFICANT CHANGE UP (ref 8.5–10.1)
CHLORIDE SERPL-SCNC: 111 MMOL/L — HIGH (ref 96–108)
CO2 SERPL-SCNC: 24 MMOL/L — SIGNIFICANT CHANGE UP (ref 22–31)
CREAT SERPL-MCNC: 1.06 MG/DL — SIGNIFICANT CHANGE UP (ref 0.5–1.3)
GLUCOSE SERPL-MCNC: 136 MG/DL — HIGH (ref 70–99)
NT-PROBNP SERPL-SCNC: 1555 PG/ML — HIGH (ref 0–450)
POTASSIUM SERPL-MCNC: 3.2 MMOL/L — LOW (ref 3.5–5.3)
POTASSIUM SERPL-SCNC: 3.2 MMOL/L — LOW (ref 3.5–5.3)
SODIUM SERPL-SCNC: 143 MMOL/L — SIGNIFICANT CHANGE UP (ref 135–145)
TROPONIN I SERPL-MCNC: 0.07 NG/ML — HIGH (ref 0.01–0.04)
TROPONIN I SERPL-MCNC: 0.09 NG/ML — HIGH (ref 0.01–0.04)

## 2019-06-25 PROCEDURE — 93010 ELECTROCARDIOGRAM REPORT: CPT

## 2019-06-25 PROCEDURE — 99223 1ST HOSP IP/OBS HIGH 75: CPT

## 2019-06-25 RX ORDER — VALSARTAN 80 MG/1
160 TABLET ORAL DAILY
Refills: 0 | Status: DISCONTINUED | OUTPATIENT
Start: 2019-06-25 | End: 2019-06-29

## 2019-06-25 RX ORDER — FUROSEMIDE 40 MG
20 TABLET ORAL ONCE
Refills: 0 | Status: COMPLETED | OUTPATIENT
Start: 2019-06-25 | End: 2019-06-25

## 2019-06-25 RX ORDER — POTASSIUM CHLORIDE 20 MEQ
40 PACKET (EA) ORAL ONCE
Refills: 0 | Status: COMPLETED | OUTPATIENT
Start: 2019-06-25 | End: 2019-06-25

## 2019-06-25 RX ADMIN — HEPARIN SODIUM 5000 UNIT(S): 5000 INJECTION INTRAVENOUS; SUBCUTANEOUS at 06:05

## 2019-06-25 RX ADMIN — CEFTRIAXONE 1000 MILLIGRAM(S): 500 INJECTION, POWDER, FOR SOLUTION INTRAMUSCULAR; INTRAVENOUS at 00:44

## 2019-06-25 RX ADMIN — Medication 25 MILLIGRAM(S): at 06:16

## 2019-06-25 RX ADMIN — Medication 4 MILLIGRAM(S): at 22:40

## 2019-06-25 RX ADMIN — Medication 40 MILLIEQUIVALENT(S): at 13:02

## 2019-06-25 RX ADMIN — Medication 81 MILLIGRAM(S): at 11:06

## 2019-06-25 RX ADMIN — HEPARIN SODIUM 5000 UNIT(S): 5000 INJECTION INTRAVENOUS; SUBCUTANEOUS at 22:40

## 2019-06-25 RX ADMIN — Medication 20 MILLIGRAM(S): at 13:02

## 2019-06-25 RX ADMIN — VALSARTAN 160 MILLIGRAM(S): 80 TABLET ORAL at 14:23

## 2019-06-25 RX ADMIN — HEPARIN SODIUM 5000 UNIT(S): 5000 INJECTION INTRAVENOUS; SUBCUTANEOUS at 13:02

## 2019-06-25 NOTE — PROGRESS NOTE ADULT - ATTENDING COMMENTS
on exam- comfortable   -rs-aeeb, crackles present   -p/a-soft, bs+  -cvs-s1s2 normal     #ct abx, supportive care
on exam- comfortable   -rs-aeeb, crackles present at the bases   -p/a-soft, bs+  -cvs-s1s2 normal     #give one dose of lasix stat and monitor him. Start his home meds for bp     #discharge plan
on exam- comfortable   -rs-aeeb, crackles present  -p/a-soft, bs+  -cvs-s1s2 normal       #ct abx, supportive care

## 2019-06-25 NOTE — PROGRESS NOTE ADULT - ASSESSMENT
83M w/ PMHx of HTN, prostate cancer, BPH, tubular adenoma, and diverticulosis who presented w/ recurrent falls admitted for sepsis secondary to PNA.      #Sepsis 2/2 PNA   - likely bacterial CAP  - still having fevers  - CT: RUL consolidation  - ID recs appreciated: continue ceftriaxone and azithromycin (D2)  - continue tylenol for fever  - urine culture negative  - blood culture: NGTD  - stool culture: NGTD  - legionella negative  - encourage PO intake  - f/u sputum culture, blood cx, stool O&P     #Increased dyspnea  - likely PNA with contribution from possible underlying CHF and HTN   - f/u echo   - f/u cardio consult   - control BP   - lasix 20mg IV x 1     #HTN  - DASH diet  - increase toprol to home 25 mg daily   - restart valsartan   - may give hydralazine PRN    #BIJAL  - Cr 1.5 on admission, baseline unknown  - resolved  - may restart valsartan     #1.2cm incidental left renal lesion  - urology recs appreciated: f/u outpatient     #DVT PPx  - Improve Score 1  - encourage ambulation

## 2019-06-25 NOTE — CONSULT NOTE ADULT - SUBJECTIVE AND OBJECTIVE BOX
CHIEF COMPLAINT:    HPI:  Pt is an 82 y/o male with PMHx of HTN, BPH, diverticulosis, prostate CA, tubular adenoma who presents to the ED via EMS after a recurrent  fall on the day of admission .    Pts reports he fell on that day while he was trying to urinate in the bathroom.  He denies LOC and reports chills, extreme weakness  and fatigue for a few  days, also assoc  bifrontal mild headache, 1-2 loose stool diarrhea and decreased appetite/PO intake. Pt saw Dr. Nik Zimmerman at 10 am this morning ,  and was prescribed cefuroxime for  chest congestion and  a sinus infection.     In the ED, pt was noted to be febrile with a rectal temp 103F.  He  denied neck pain, back pain, bilateral lower extremities pain. He reports 1 week of a non-productive cough.  No SOB or chest pain.  No hx of renal problems.  On 81mg every other day.      In the ED, he received Sepsis antibiotics with 2gm Cefepime and Vanco 1gm,  and 2400 ml of LR, 650mg tylenol.  patient was receiving IV fluid while in the hospital , patient did have pneumonia   patient was feeling ok , however felt more sob when he walked to bathroom  which got better with oxygen ,  his blood pressure was elevated ,  his iv fluids was discontinued , received Iv diuretic , which made his symptoms better . patient did have elevated blood pressure while in the hospital ,   called for cardiology consult as patient told he had leaky valve , and possible CHF     Patient denies any hx of MI or chf in the past     Fam Hx:  Father  at 70 , Multiple Myeloma  Mother  at 91, Dementia (2019 20:26)      PAST MEDICAL & SURGICAL HISTORY:  Diverticulosis: sigmoid  Tubular adenoma  BPH (Benign Prostatic Hyperplasia)  History of prostate cancer: seed implant 2004  HTN (hypertension)  Sensorineural hearing loss: bilateral  Rhinitis  Deviated nasal septum  History of atypical nevus  S/P cataract surgery: left eye  Prostate ca: with seed implantation 2004  Mastoid disorder  History of tonsillectomy  History of rhinoplasty      Allergies    adhesives (Rash)  No Known Drug Allergies    Intolerances        SOCIAL HISTORY: former smoker     FAMILY HISTORY:      MEDICATIONS:  MEDICATIONS  (STANDING):  aspirin enteric coated 81 milliGRAM(s) Oral daily  azithromycin  IVPB 500 milliGRAM(s) IV Intermittent every 24 hours  azithromycin  IVPB      cefTRIAXone Injectable. 1000 milliGRAM(s) IV Push every 24 hours  doxazosin 4 milliGRAM(s) Oral at bedtime  heparin  Injectable 5000 Unit(s) SubCutaneous every 8 hours  metoprolol succinate ER 25 milliGRAM(s) Oral daily  sodium chloride 0.9%. 1000 milliLiter(s) (50 mL/Hr) IV Continuous <Continuous>  valsartan 160 milliGRAM(s) Oral daily    MEDICATIONS  (PRN):  acetaminophen   Tablet .. 650 milliGRAM(s) Oral every 6 hours PRN Temp greater or equal to 38C (100.4F)  hydrALAZINE Injectable 5 milliGRAM(s) IV Push every 1 hour PRN SBP > 180      REVIEW OF SYSTEMS:  as above     All other review of systems is negative unless indicated above    Vital Signs Last 24 Hrs  T(C): 36.9 (2019 14:26), Max: 38.5 (2019 16:26)  T(F): 98.5 (2019 14:26), Max: 101.3 (2019 16:26)  HR: 86 (2019 14:26) (72 - 98)  BP: 128/68 (2019 14:26) (128/68 - 186/94)  BP(mean): --  RR: 21 (2019 14:26) (20 - 30)  SpO2: 95% (2019 14:26) (94% - 98%)    I&O's Summary    2019 07:  -  2019 07:00  --------------------------------------------------------  IN: 0 mL / OUT: 560 mL / NET: -560 mL    2019 07:01  -  2019 15:07  --------------------------------------------------------  IN: 0 mL / OUT: 825 mL / NET: -825 mL        PHYSICAL EXAM:    Constitutional: NAD, awake and alert, well-developed  HEENT: PERR, EOMI,  No oral cyananosis.  Neck:  supple,  No JVD  Respiratory: Breath sounds are clear bilaterally, No wheezing, rales or rhonchi  Cardiovascular: S1 and S2, regular rate and rhythm, no Murmurs, gallops or rubs  Gastrointestinal: Bowel Sounds present, soft, nontender.   Extremities: No peripheral edema. No clubbing or cyanosis.  Vascular: 2+ peripheral pulses  Neurological: A/O x 3, no focal deficits  Musculoskeletal: no calf tenderness.  Skin: No rashes.      LABS: All Labs Reviewed:                        13.8   8.13  )-----------( 177      ( 2019 08:34 )             41.3                         12.8   9.60  )-----------( 169      ( 2019 17:00 )             37.6     2019 08:35    143    |  111    |  18     ----------------------------<  136    3.2     |  24     |  1.06   2019 08:34    141    |  107    |  20     ----------------------------<  105    3.5     |  25     |  1.18   2019 17:00    139    |  105    |  25     ----------------------------<  110    3.0     |  26     |  1.39     Ca    8.8        2019 08:35  Ca    8.9        2019 08:34  Ca    8.4        2019 17:00    TPro  7.3    /  Alb  2.7    /  TBili  0.6    /  DBili  x      /  AST  47     /  ALT  24     /  AlkPhos  52     2019 08:34  TPro  6.6    /  Alb  2.7    /  TBili  0.5    /  DBili  x      /  AST  28     /  ALT  16     /  AlkPhos  49     2019 17:00          Blood Culture: Organism --  Gram Stain Blood -- Gram Stain --  Specimen Source .Stool Feces  Culture-Blood --    Organism --  Gram Stain Blood -- Gram Stain --  Specimen Source .Stool Feces  Culture-Blood --    Organism --  Gram Stain Blood -- Gram Stain --  Specimen Source .Blood Blood-Peripheral  Culture-Blood --    Organism --  Gram Stain Blood -- Gram Stain --  Specimen Source .Blood Blood-Peripheral  Culture-Blood --            RADIOLOGY/EKG:< from: 12 Lead ECG (19 @ 14:35) >  Sinus rhythm with Premature atrial complexes  Moderate voltage criteria for LVH, may be normal variant  Nonspecific ST abnormality  Abnormal ECG  No previous ECGs available  Confirmed by MD SHEFFIELD PAUL ([id number]) on 2019 11:43:43 PM    < end of copied text >    < from: Xray Chest 1 View- PORTABLE-Urgent (19 @ 15:05) >  IMPRESSION: Right upper lobe pneumonia, unchanged since 19    < end of copied text >

## 2019-06-26 DIAGNOSIS — I27.20 PULMONARY HYPERTENSION, UNSPECIFIED: ICD-10-CM

## 2019-06-26 DIAGNOSIS — R41.89 OTHER SYMPTOMS AND SIGNS INVOLVING COGNITIVE FUNCTIONS AND AWARENESS: ICD-10-CM

## 2019-06-26 LAB
ANION GAP SERPL CALC-SCNC: 7 MMOL/L — SIGNIFICANT CHANGE UP (ref 5–17)
BASE EXCESS BLDA CALC-SCNC: 0.3 MMOL/L — SIGNIFICANT CHANGE UP (ref -2–2)
BLOOD GAS COMMENTS ARTERIAL: SIGNIFICANT CHANGE UP
BUN SERPL-MCNC: 18 MG/DL — SIGNIFICANT CHANGE UP (ref 7–23)
CALCIUM SERPL-MCNC: 8.9 MG/DL — SIGNIFICANT CHANGE UP (ref 8.5–10.1)
CHLORIDE SERPL-SCNC: 110 MMOL/L — HIGH (ref 96–108)
CO2 SERPL-SCNC: 27 MMOL/L — SIGNIFICANT CHANGE UP (ref 22–31)
CREAT SERPL-MCNC: 1 MG/DL — SIGNIFICANT CHANGE UP (ref 0.5–1.3)
CULTURE RESULTS: SIGNIFICANT CHANGE UP
CULTURE RESULTS: SIGNIFICANT CHANGE UP
GAS PNL BLDA: SIGNIFICANT CHANGE UP
GLUCOSE BLDC GLUCOMTR-MCNC: 168 MG/DL — HIGH (ref 70–99)
GLUCOSE SERPL-MCNC: 101 MG/DL — HIGH (ref 70–99)
HCO3 BLDA-SCNC: 22 MMOL/L — SIGNIFICANT CHANGE UP (ref 21–29)
HCT VFR BLD CALC: 36.8 % — LOW (ref 39–50)
HGB BLD-MCNC: 12.6 G/DL — LOW (ref 13–17)
MAGNESIUM SERPL-MCNC: 2.3 MG/DL — SIGNIFICANT CHANGE UP (ref 1.6–2.6)
MCHC RBC-ENTMCNC: 30.1 PG — SIGNIFICANT CHANGE UP (ref 27–34)
MCHC RBC-ENTMCNC: 34.2 GM/DL — SIGNIFICANT CHANGE UP (ref 32–36)
MCV RBC AUTO: 88 FL — SIGNIFICANT CHANGE UP (ref 80–100)
PCO2 BLDA: 29 MMHG — LOW (ref 32–46)
PH BLDA: 7.5 — HIGH (ref 7.35–7.45)
PLATELET # BLD AUTO: 213 K/UL — SIGNIFICANT CHANGE UP (ref 150–400)
PO2 BLDA: 68 MMHG — LOW (ref 74–108)
POTASSIUM SERPL-MCNC: 3.5 MMOL/L — SIGNIFICANT CHANGE UP (ref 3.5–5.3)
POTASSIUM SERPL-SCNC: 3.5 MMOL/L — SIGNIFICANT CHANGE UP (ref 3.5–5.3)
RBC # BLD: 4.18 M/UL — LOW (ref 4.2–5.8)
RBC # FLD: 13.9 % — SIGNIFICANT CHANGE UP (ref 10.3–14.5)
SAO2 % BLDA: 93 % — SIGNIFICANT CHANGE UP (ref 92–96)
SODIUM SERPL-SCNC: 144 MMOL/L — SIGNIFICANT CHANGE UP (ref 135–145)
SPECIMEN SOURCE: SIGNIFICANT CHANGE UP
SPECIMEN SOURCE: SIGNIFICANT CHANGE UP
TROPONIN I SERPL-MCNC: 0.32 NG/ML — HIGH (ref 0.01–0.04)
WBC # BLD: 11.13 K/UL — HIGH (ref 3.8–10.5)
WBC # FLD AUTO: 11.13 K/UL — HIGH (ref 3.8–10.5)

## 2019-06-26 PROCEDURE — 71275 CT ANGIOGRAPHY CHEST: CPT | Mod: 26

## 2019-06-26 PROCEDURE — 93010 ELECTROCARDIOGRAM REPORT: CPT

## 2019-06-26 PROCEDURE — 99233 SBSQ HOSP IP/OBS HIGH 50: CPT

## 2019-06-26 PROCEDURE — 93306 TTE W/DOPPLER COMPLETE: CPT | Mod: 26

## 2019-06-26 PROCEDURE — 71045 X-RAY EXAM CHEST 1 VIEW: CPT | Mod: 26

## 2019-06-26 RX ORDER — SODIUM CHLORIDE 9 MG/ML
1000 INJECTION INTRAMUSCULAR; INTRAVENOUS; SUBCUTANEOUS
Refills: 0 | Status: DISCONTINUED | OUTPATIENT
Start: 2019-06-26 | End: 2019-06-26

## 2019-06-26 RX ORDER — CEFEPIME 1 G/1
1000 INJECTION, POWDER, FOR SOLUTION INTRAMUSCULAR; INTRAVENOUS ONCE
Refills: 0 | Status: COMPLETED | OUTPATIENT
Start: 2019-06-26 | End: 2019-06-26

## 2019-06-26 RX ORDER — ENOXAPARIN SODIUM 100 MG/ML
80 INJECTION SUBCUTANEOUS EVERY 12 HOURS
Refills: 0 | Status: DISCONTINUED | OUTPATIENT
Start: 2019-06-26 | End: 2019-06-30

## 2019-06-26 RX ORDER — CEFEPIME 1 G/1
INJECTION, POWDER, FOR SOLUTION INTRAMUSCULAR; INTRAVENOUS
Refills: 0 | Status: DISCONTINUED | OUTPATIENT
Start: 2019-06-26 | End: 2019-07-01

## 2019-06-26 RX ORDER — SODIUM CHLORIDE 9 MG/ML
1000 INJECTION, SOLUTION INTRAVENOUS
Refills: 0 | Status: DISCONTINUED | OUTPATIENT
Start: 2019-06-26 | End: 2019-06-27

## 2019-06-26 RX ORDER — CEFEPIME 1 G/1
1000 INJECTION, POWDER, FOR SOLUTION INTRAMUSCULAR; INTRAVENOUS EVERY 12 HOURS
Refills: 0 | Status: DISCONTINUED | OUTPATIENT
Start: 2019-06-27 | End: 2019-07-01

## 2019-06-26 RX ORDER — ENOXAPARIN SODIUM 100 MG/ML
80 INJECTION SUBCUTANEOUS
Refills: 0 | Status: DISCONTINUED | OUTPATIENT
Start: 2019-06-26 | End: 2019-06-26

## 2019-06-26 RX ORDER — FUROSEMIDE 40 MG
20 TABLET ORAL ONCE
Refills: 0 | Status: DISCONTINUED | OUTPATIENT
Start: 2019-06-26 | End: 2019-07-02

## 2019-06-26 RX ORDER — CEFEPIME 1 G/1
INJECTION, POWDER, FOR SOLUTION INTRAMUSCULAR; INTRAVENOUS
Refills: 0 | Status: DISCONTINUED | OUTPATIENT
Start: 2019-06-26 | End: 2019-06-26

## 2019-06-26 RX ADMIN — AZITHROMYCIN 255 MILLIGRAM(S): 500 TABLET, FILM COATED ORAL at 00:54

## 2019-06-26 RX ADMIN — Medication 25 MILLIGRAM(S): at 05:18

## 2019-06-26 RX ADMIN — CEFTRIAXONE 1000 MILLIGRAM(S): 500 INJECTION, POWDER, FOR SOLUTION INTRAMUSCULAR; INTRAVENOUS at 00:55

## 2019-06-26 RX ADMIN — Medication 60 MILLIGRAM(S): at 11:27

## 2019-06-26 RX ADMIN — VALSARTAN 160 MILLIGRAM(S): 80 TABLET ORAL at 05:22

## 2019-06-26 RX ADMIN — SODIUM CHLORIDE 75 MILLILITER(S): 9 INJECTION, SOLUTION INTRAVENOUS at 13:20

## 2019-06-26 RX ADMIN — Medication 81 MILLIGRAM(S): at 13:20

## 2019-06-26 RX ADMIN — HEPARIN SODIUM 5000 UNIT(S): 5000 INJECTION INTRAVENOUS; SUBCUTANEOUS at 05:18

## 2019-06-26 RX ADMIN — Medication 4 MILLIGRAM(S): at 20:39

## 2019-06-26 RX ADMIN — CEFEPIME 1000 MILLIGRAM(S): 1 INJECTION, POWDER, FOR SOLUTION INTRAMUSCULAR; INTRAVENOUS at 23:31

## 2019-06-26 RX ADMIN — ENOXAPARIN SODIUM 80 MILLIGRAM(S): 100 INJECTION SUBCUTANEOUS at 14:32

## 2019-06-26 RX ADMIN — AZITHROMYCIN 255 MILLIGRAM(S): 500 TABLET, FILM COATED ORAL at 23:32

## 2019-06-26 NOTE — PROGRESS NOTE ADULT - ASSESSMENT
A/P: 83 male with B/L PE and hypoxic respiratory failure      Plan:  Patient to be transferred to the SDU  Agree with Lovenox 80mg bid  not a candidate for TPA at this time  Had a Head CT that was negative for bleed after his fall  Continue with supplemental O2 as needed to keep Sat > 92%  B/L LE dopplers pending  D/W the patient his wife, daughter, and pulmonary.

## 2019-06-26 NOTE — PROGRESS NOTE ADULT - ASSESSMENT
83M w/ PMHx of HTN, prostate cancer, BPH, tubular adenoma, and diverticulosis who presented w/ recurrent falls admitted for sepsis secondary to PNA.      #Sepsis 2/2 PNA   - likely bacterial CAP  - still having fevers  - CT: RUL consolidation  - ID recs appreciated: continue ceftriaxone and azithromycin (D4)  - continue tylenol for fever  - urine culture negative  - blood culture: NGTD  - stool culture: NGTD  - legionella negative  - encourage PO intake  - f/u sputum culture, blood cx, stool O&P     #Severe PE  - lovenox 80 mg BID  - possible PE prior to admission with no known risk factors  - possible PE during admission given negative CT w/ contrast (though not PE protocol), but pt was on heparin DVT PPx  - continue to monitor vitals  - continue to monitor on tele     #HTN  - DASH diet  - continue toprol 25 mg daily   - continue valsartan   - may give hydralazine PRN    #1.2cm incidental left renal lesion  - urology recs appreciated: f/u outpatient     #DVT PPx  - Improve Score 1  - encourage ambulation 83M w/ PMHx of HTN, prostate cancer, BPH, tubular adenoma, and diverticulosis who presented w/ recurrent falls admitted for sepsis secondary to PNA.      #Sepsis 2/2 PNA   - likely bacterial CAP  - still having fevers  - CT: RUL consolidation  - ID recs appreciated: continue ceftriaxone and azithromycin (D4)  - continue tylenol for fever  - urine culture negative  - blood culture: NGTD  - stool culture: NGTD  - legionella negative  - encourage PO intake  - f/u sputum culture, blood cx, stool O&P     #Severe PE  - lovenox 80 mg BID  - possible PE prior to admission with no known risk factors  - possible PE during admission given negative CT w/ contrast (though not PE protocol), but pt was on heparin DVT PPx  - continue to monitor vitals  - continue to monitor on tele     #HTN  - DASH diet  - continue toprol 25 mg daily   - continue valsartan   - may give hydralazine PRN    #1.2cm incidental left renal lesion  - urology recs appreciated: f/u outpatient     #DVT PPx  - On therapeutic lovenox

## 2019-06-26 NOTE — CONSULT NOTE ADULT - SUBJECTIVE AND OBJECTIVE BOX
Patient is a 83y old  Male who presents with a chief complaint of Fever  RUL Pna  Weakness  Fall  Abd Distension  Overflow Incontinence/Urinary Urgency (2019 12:30)      HPI:  Pt is an 84 y/o male with PMHx of HTN, BPH, diverticulosis, prostate CA, tubular adenoma who presents to the ED via EMS after a recurrent  fall this afternoon.    Pts reports he fell at 6pm last night and again today at 1pm while he was trying to urinate in the bathroom.  He denies LOC and reports chills, extreme weakness  and fatigue for a few  days, also assoc  bifrontal mild headache, 1-2 loose stool diarrhea and decreased appetite/PO intake. Pt saw Dr. Nik Zimmerman at 10 am this morning ,  and was prescribed cefuroxime for  chest congestion and  a sinus infection.     In the ED, pt was noted to be febrile with a rectal temp 103F.  He  denied neck pain, back pain, bilateral lower extremities pain. He reports 1 week of a non-productive cough.  No SOB or chest pain.  No hx of renal problems.  On 81mg every other day.      In the ED, he received Sepsis antibiotics with 2gm Cefepime and Vanco 1gm,  and 2400 ml of LR, 650mg tylenol.  EMERGENCY consult was called this evening. I spoke with dr James and shira for ICU eval / ABG / EKG and came to see him along with Dr Najera from ICU.  Hx obtained from pt and family as well as RN staff on 3 north at bedside.  In summary pt gibran admitted with DX RUL pneumonia with inital ct chest done with IV contrast but it was not PE protocol followed by episodes of dizziness and worsened hypozxemia and earlier today with pt collapse.  CTA with PE protocol done confirmed bilateral extensive PE and Pulm infarct pattern seen RUL, LLL  pt is tachypneic but no pleuritic pain / no hemoptysis and remains on o2 nc.  now will be moved to ICU step down unit.  lovenox started earlier today  ECHO data noted and discussed  Wife and DTR are at bedside Dr Jing Rouse [phone]  no hx DVT / PE  used to be very active    Fam Hx:  Father  at 70 , Multiple Myeloma  Mother  at 91, Dementia (2019 20:26)      PAST MEDICAL & SURGICAL HISTORY:  Diverticulosis: sigmoid  Tubular adenoma  BPH (Benign Prostatic Hyperplasia)  History of prostate cancer: seed implant 2004  HTN (hypertension)  Sensorineural hearing loss: bilateral  Rhinitis  Deviated nasal septum  History of atypical nevus  S/P cataract surgery: left eye  Prostate ca: with seed implantation 2004  Mastoid disorder  History of tonsillectomy  History of rhinoplasty      PREVIOUS DIAGNOSTIC TESTING:      MEDICATIONS  (STANDING):  aspirin enteric coated 81 milliGRAM(s) Oral daily  azithromycin  IVPB 500 milliGRAM(s) IV Intermittent every 24 hours  azithromycin  IVPB      cefTRIAXone Injectable. 1000 milliGRAM(s) IV Push every 24 hours  doxazosin 4 milliGRAM(s) Oral at bedtime  enoxaparin Injectable 80 milliGRAM(s) SubCutaneous two times a day  furosemide   Injectable 20 milliGRAM(s) IV Push once  metoprolol succinate ER 25 milliGRAM(s) Oral daily  sodium chloride 0.45%. 1000 milliLiter(s) (75 mL/Hr) IV Continuous <Continuous>  sodium chloride 0.9%. 1000 milliLiter(s) (50 mL/Hr) IV Continuous <Continuous>  valsartan 160 milliGRAM(s) Oral daily    MEDICATIONS  (PRN):  acetaminophen   Tablet .. 650 milliGRAM(s) Oral every 6 hours PRN Temp greater or equal to 38C (100.4F)  hydrALAZINE Injectable 5 milliGRAM(s) IV Push every 1 hour PRN SBP > 180      FAMILY HISTORY:      SOCIAL HISTORY:  3quit smoking 60 yrs ago  retired electronic     REVIEW OF SYSTEM:  Pertinent items are noted in HPI.  Constitutional negative for chills, fevers, sweats and weight loss  throat, and face:  negative for epistaxis, nasal congestion, sore throat and   tinnitus  Respiratory: pos for cough, dyspnea on exertion,no pleuritic chest pain  and wheezing  Cardiovascular:  no for chest pain, pos dyspnea and palpitations  Gastrointestinal: negative for abdominal pain, diarrhea, nausea and vomiting  Genitourinary: negative for dysuria, frequency and urinary incontinence  Skin:  negative for redness, rash, pruritus, swelling, dryness and   fissures  Hematologic/lymphatic: negative for bleeding and easy bruising  Musculoskeletal: negative for arthralgias, back pain and muscle weakness  Neurological: negative for dizziness, headaches, seizures and tremors  Behavioral/Psych:  negative for mood change, depression, suicidal attempts    Allergic/Immunologic: negative for anaphylaxis, angioedema and urticaria    Vital Signs Last 24 Hrs  T(C): 36.3 (2019 20:02), Max: 37.4 (2019 05:28)  T(F): 97.3 (2019 20:02), Max: 99.4 (2019 05:28)  HR: 82 (2019 20:02) (68 - 108)  BP: 148/82 (2019 20:02) (110/51 - 177/103)  BP(mean): 95 (2019 16:00) (95 - 101)  RR: 17 (2019 20:02) (17 - 31)  SpO2: 95% (2019 20:02) (89% - 98%)    I&O's Summary    2019 07:01  -  2019 07:00  --------------------------------------------------------  IN: 240 mL / OUT: 1125 mL / NET: -885 mL      PHYSICAL EXAM  General Appearance: cooperative, mild -mod resp distress, able to speak in short sentences  HEENT: PERRL, conjunctiva clear, EOM's intact, non injected pharynx, no exudate, TM   normal  Neck: Supple, , no adenopathy, thyroid: not enlarged, no carotid bruit or JVD  Back: Symmetric, no  tenderness,no soft tissue tenderness  Lungs: mild decreased breath sounds on bases, no wheezing, few lower lobe rhonchi  Heart: Regular rate and rhythm, S1, S2 normal, no murmur, rub or gallop  Abdomen: Soft, non-tender, bowel sounds active , no hepatosplenomegaly  Extremities: no cyanosis or edema, no joint swelling, No calf tenderness  Skin: Skin color, texture normal, no rashes   Neurologic: Alert and oriented X3 , cranial nerves intact, sensory and motor normal,    ECG:    repeat EKG done now without acute ischemic changes    < from: 12 Lead ECG (19 @ 09:48) >  Ventricular Rate 105 BPM    Atrial Rate 105 BPM    P-R Interval 160 ms    QRS Duration 92 ms    Q-T Interval 358 ms    QTC Calculation(Bezet) 473 ms    P Axis 25 degrees    R Axis -20 degrees    T Axis 27 degrees    Diagnosis Line Sinus tachycardia  Otherwise normal ECG  When compared with ECG of 2019 09:48,  No significant change was found    < end of copied text >      LABS:          144  |  110<H>  |  18  ----------------------------<  101<H>  3.5   |  27  |  1.00    Ca    8.9      2019 08:14  Mg     2.3           CARDIAC MARKERS ( 2019 19:32 )  0.069 ng/mL / x     / x     / x     / x      CARDIAC MARKERS ( 2019 16:51 )  0.088 ng/mL / x     / x     / x     / x            144  |  110<H>  |  18  ----------------------------<  101<H>  3.5   |  27  |  1.00    Ca    8.9      2019 08:14  Mg     2.3             Pro BNP  1555  @ 16:51  D Dimer  --  @ 16:51    Troponin I, Serum Repeat 3 hours x 2 occurrences (19 @ 19:32)    Troponin I, Serum: 0.069: High Sensitivity Troponin and new reference  range effective 2016 ng/mL        ABG - ( 2019 19:43 )  pH, Arterial: 7.50  pH, Blood: x     /  pCO2: 29    /  pO2: 68    / HCO3: 22    / Base Excess: .3    /  SaO2: 93        ECHO noted      < from: Transthoracic Echocardiogram (19 @ 10:54) >   Impression     Summary     Mild concentric left ventricular hypertrophy is present.   Left ventricular wallmotion appears hyperdynamic.   Estimated left ventricular ejection fraction is 70 %.   The IVC appears minimally dilated and is collapsing with inspiration.   Lipomatous atrial septal wall noted.   Fibrocalcific changes noted to the mitral valve leaflets with preserved   leaflet excursion.   Trace mitral regurgitation is present.   EA reversal of the mitral inflow consistent with reduced compliance of   the   left ventricle.   Fibrocalcific changes noted to the Aortic valve leaflets with preserved   leaflet excursion.   Moderate (2+) tricuspid valve regurgitation is present.   The estimated RVSP is 63 mmHg.    < end of copied text >      < from: CT Chest w/ IV Cont (19 @ 16:36) >    IMPRESSION:     No acute traumatic injury.     Age-indeterminate mild superior endplate compression fracture at T12.    Posterior right upper lobe consolidation, likely pneumonia. Follow-up to   complete resolution.    1.2 cm indeterminate left renal lesion. Follow-up nonemergent MRI abdomen   with and without contrast.      < end of copied text >    RADIOLOGY & ADDITIONAL STUDIES:    < from: CT Angio Chest PE Protocol w/ IV Cont (19 @ 12:42) >  FINDINGS: personally reveiwed    LOWER NECK: Within normal limits.  AXILLA, MEDIASTINUM AND VALERIY: No lymphadenopathy.  VESSELS: Pulmonary embolism involving the descending left pulmonary   artery, right main and interlobar pulmonary arteries, and multiple   segmental and subsegmental branches of all lobes.  Enlarged main   pulmonary artery measuring 3.7 cm. Atherosclerotic arterial   calcifications, including the coronary arteries.  Normal caliber aorta.  HEART: The heart is normal in size.No pericardial effusion.  PLEURA: Trace bilateral pleural effusions.   LUNGS AND LARGE AIRWAYS: Mixed groundglass and consolidative peripheral   opacity in the right upper lobe compatible with infarction. Small   groundglass opacity in the posterior left lower lobe.No suspicious   pulmonary nodule or mass. Benign bilateral calcified granulomata.. Patent   central airways.   VISUALIZED UPPER ABDOMEN: Bilateral renal indeterminate lesions,   measuring 1.4 cm on the right and 1.2 cm on the left. Hepatic cyst. Small   hiatal hernia.  BONES: No acute abnormality. Moderate T12 compression deformity, stable   since 2019.  CHEST WALL:  Unremarkable    IMPRESSION:  Extensive bilateral pulmonary embolism.    This finding was discussed with  on 2019, shortly   after the examination was performed.    Enlarged main pulmonary artery and straightening of the interventricular   septum suggestive of right heart strain.    Right upper lobe pulmonary infarct. Probable small left lower lobe   pulmonary infarct.    Bilateral indeterminate renal lesions. Recommend MRI to further evaluate.    < end of copied text >

## 2019-06-26 NOTE — CHART NOTE - NSCHARTNOTEFT_GEN_A_CORE
Called to bedside around 0940 this morning on this patient who  became acutely unrepsonsiveness for period of time while having a bowel movement on floor while walking with Physical Therapy. Uncertain as to timeline as to which preceded which, seemed to have occurred simultaneously. Pt placed on 100% nonrebreather mask and transitioned back to NC. Pt now alert and responsive. Pt now expresses chest tightness. Pt was assessed by myself approx 20 -30 mins prior to this episode and was mentating well and was in no apparent distress.     VS HR 140s      Gen: elderly male, alert, awake  Card: S1, S2 tachycardic  Pulm: mild expiratory wheezes in lung apices  Abd: mild abdominal breathing  Extr: 1+ pedal edema    A/P: 84 yo M pt became transiently unresponsive with concomitant bowel movement while . Liley vasovagal episode. Found to be tachycardic to 140s.  - STAT EKG reveals sinus tachycardia  - STAT Transfer to Tele/CICU  - STAT Duoneb    Will discuss with Dr. James

## 2019-06-26 NOTE — CHART NOTE - NSCHARTNOTEFT_GEN_A_CORE
Patient family was at bedside this afternoon shortly after rounds with questions and concerns. Met with family in CICU/ CCU Family meeting room to discuss episode this morning (vasovagal episode with PT and desaturation) and need for transfer to CICU and additional workup. Preliminary ECHO findings with concerns for pulmonary hypertension and need to rule out underlying causes including but not limited to COPD, pulmonary emboli, CHF. Indication for Chest CTA for r/o pulmonary emboli discussed with family. All questions and concerns were answered and addressed.    Addendum: Radiology informed us shortly after this family discussion of severe PE noted on Chest CTA. Pt and family were updated on CTA findings and plan. Lovenox 80 BID initiated immediately. All questions and concerns were answered and addressed.         Rina Gutierres MD PGY2  D/w Dr. James

## 2019-06-26 NOTE — CONSULT NOTE ADULT - ASSESSMENT
Pt is an 82 y/o male with PMHx of HTN, BPH, diverticulosis, prostate CA, tubular adenoma who presents to the ED via EMS after a recurrent  fall this afternoon.    Pts reports he fell at 6pm last night and again today at 1pm while he was trying to urinate in the bathroom.  He denies LOC and reports chills, extreme weakness  and fatigue for a few  days, also assoc  bifrontal mild headache, 1-2 loose stool diarrhea and decreased appetite/PO intake. Pt saw Dr. Nik Zimmerman at 10 am this morning ,  and was prescribed cefuroxime for  chest congestion and  a sinus infection.     In the ED, pt was noted to be febrile with a rectal temp 103F.  He  denied neck pain, back pain, bilateral lower extremities pain. He reports 1 week of a non-productive cough.  No SOB or chest pain.  No hx of renal problems.  On 81mg every other day.      In the ED, he received Sepsis antibiotics with 2gm Cefepime and Vanco 1gm,  and 2400 ml of LR, 650mg tylenol.    EMERGENCY consult was called this evening. I spoke with Dr Erika walls case was discussed,, I also spoke with RN staff and data obtained,   I asked for ICU eval / ABG / EKG and came to see him along with Dr Najera from ICU.  Hx obtained from pt and family as well as RN staff on 3 north at bedside.  In summary pt was admitted with DX RUL pneumonia with inital ct chest done with IV contrast but it was not PE protocol followed by episodes of dizziness and worsened hypoxemia and earlier today with pt collapse.  CTA with PE protocol done confirmed bilateral extensive PE and Pulm infarct pattern seen RUL, LLL  pt is tachypneic but no pleuritic pain / no hemoptysis and remains on o2 nc.  now will be moved to ICU step down unit.  lovenox started earlier today  ECHO data noted and discussed  Wife and DTR are at bedside Dr Jing Rouse [phone]  no hx DVT / PE  used to be very active    assessment / plan:  Acute Hypoxemic respirator failure  Acute on chronic PE [place]  Moderate-severe pulm HTn due to above  Hemodynamically stable  Increased A-a gradient on ABG / adeq oxygenation now on 4-5 liters nc and declines O2 mask  pneumonia related sxs on admission with residual findings on ct scan  Elevated tropnin likely due to demand ischemia / no EKG changes  no clinical indication presently for thrombolysis and family declined this modality with incrased risk of bleeding discussed  T 12 compression fx  small bilateral pleural effusions R>L      plan for transfer to ICU step down unit now  Lovenox dose appropriate  Monitor resp status and Oxygen saturation carefully  ICU eval with Dr Najera appreciated and all findings with increased risk of morbidity and mortality with this dx explained to family  Thrombophilia workup necessary but can be done later as it will not   repeat labs and will consider switch antibiotics if needed after discussion with ID  cardiology consult also recommended for am  repeat CBC and all labs in am  ABG and cxr in am  Liv x 3

## 2019-06-26 NOTE — PROVIDER CONTACT NOTE (CRITICAL VALUE NOTIFICATION) - TEST AND RESULT REPORTED:
Trop 0.317
Troponin: 0.088
Amelie Osborn [facility]/[phone] NICU.

## 2019-06-26 NOTE — PROGRESS NOTE ADULT - ASSESSMENT
84 y/o male with h/o HTN, BPH, diverticulosis, prostate CA, tubular adenoma was admitted on 6/22 for increased weakness and recurrent  falls. Pts reports he fell at 6pm the night PTA and again the next day while he was trying to urinate in the bathroom.  He denies LOC, but reports chills, extreme weakness and fatigue for a few  days, also assoc bifrontal mild headache, 1-2 loose stools and decreased appetite/PO intake. Pt saw Dr. Nik Zimmerman earlier on the day of admission and was prescribed cefuroxime for chest congestion and  a sinus infection. In ER he was noted febrile with a rectal temp 103F. He received cefepime and Vanco 1gm, then ceftriaxone and azithromycin.    1. Febrile syndrome improving. Possible sepsis. RUL pneumonia. CRF stage 3. ?ARF.  -dyspnea is improving  -leukocytosis resolved  -renal function improved  - BC x 2 reviewed  -on ceftriaxone 1 gm IV qd and azithromycin 500 mg IV qd # 4  -tolerating abx well so far; no side effects noted  -continue abx coverage  -hydration  -monitor temps  -f/u CBC  -supportive care  2. Other issues:   -care per medicine

## 2019-06-27 DIAGNOSIS — I26.99 OTHER PULMONARY EMBOLISM WITHOUT ACUTE COR PULMONALE: ICD-10-CM

## 2019-06-27 LAB
ALBUMIN SERPL ELPH-MCNC: 2.3 G/DL — LOW (ref 3.3–5)
ALP SERPL-CCNC: 52 U/L — SIGNIFICANT CHANGE UP (ref 40–120)
ALT FLD-CCNC: 67 U/L — SIGNIFICANT CHANGE UP (ref 12–78)
ANION GAP SERPL CALC-SCNC: 9 MMOL/L — SIGNIFICANT CHANGE UP (ref 5–17)
AST SERPL-CCNC: 71 U/L — HIGH (ref 15–37)
BASE EXCESS BLDA CALC-SCNC: 1.3 MMOL/L — SIGNIFICANT CHANGE UP (ref -2–2)
BILIRUB SERPL-MCNC: 0.4 MG/DL — SIGNIFICANT CHANGE UP (ref 0.2–1.2)
BUN SERPL-MCNC: 25 MG/DL — HIGH (ref 7–23)
CALCIUM SERPL-MCNC: 8.8 MG/DL — SIGNIFICANT CHANGE UP (ref 8.5–10.1)
CHLORIDE SERPL-SCNC: 112 MMOL/L — HIGH (ref 96–108)
CO2 SERPL-SCNC: 24 MMOL/L — SIGNIFICANT CHANGE UP (ref 22–31)
CREAT SERPL-MCNC: 1.09 MG/DL — SIGNIFICANT CHANGE UP (ref 0.5–1.3)
CULTURE RESULTS: SIGNIFICANT CHANGE UP
CULTURE RESULTS: SIGNIFICANT CHANGE UP
GLUCOSE SERPL-MCNC: 101 MG/DL — HIGH (ref 70–99)
HCO3 BLDA-SCNC: 24 MMOL/L — SIGNIFICANT CHANGE UP (ref 21–29)
HCT VFR BLD CALC: 36.1 % — LOW (ref 39–50)
HGB BLD-MCNC: 12.3 G/DL — LOW (ref 13–17)
MCHC RBC-ENTMCNC: 30.4 PG — SIGNIFICANT CHANGE UP (ref 27–34)
MCHC RBC-ENTMCNC: 34.1 GM/DL — SIGNIFICANT CHANGE UP (ref 32–36)
MCV RBC AUTO: 89.1 FL — SIGNIFICANT CHANGE UP (ref 80–100)
PCO2 BLDA: 31 MMHG — LOW (ref 32–46)
PH BLDA: 7.49 — HIGH (ref 7.35–7.45)
PHOSPHATE SERPL-MCNC: 4.2 MG/DL — SIGNIFICANT CHANGE UP (ref 2.5–4.5)
PLATELET # BLD AUTO: 210 K/UL — SIGNIFICANT CHANGE UP (ref 150–400)
PO2 BLDA: 64 MMHG — LOW (ref 74–108)
POTASSIUM SERPL-MCNC: 3.7 MMOL/L — SIGNIFICANT CHANGE UP (ref 3.5–5.3)
POTASSIUM SERPL-SCNC: 3.7 MMOL/L — SIGNIFICANT CHANGE UP (ref 3.5–5.3)
PROT SERPL-MCNC: 6.2 GM/DL — SIGNIFICANT CHANGE UP (ref 6–8.3)
RBC # BLD: 4.05 M/UL — LOW (ref 4.2–5.8)
RBC # FLD: 14 % — SIGNIFICANT CHANGE UP (ref 10.3–14.5)
SAO2 % BLDA: 93 % — SIGNIFICANT CHANGE UP (ref 92–96)
SODIUM SERPL-SCNC: 145 MMOL/L — SIGNIFICANT CHANGE UP (ref 135–145)
SPECIMEN SOURCE: SIGNIFICANT CHANGE UP
SPECIMEN SOURCE: SIGNIFICANT CHANGE UP
TROPONIN I SERPL-MCNC: 0.1 NG/ML — HIGH (ref 0.01–0.04)
TROPONIN I SERPL-MCNC: 0.15 NG/ML — HIGH (ref 0.01–0.04)
WBC # BLD: 13.14 K/UL — HIGH (ref 3.8–10.5)
WBC # FLD AUTO: 13.14 K/UL — HIGH (ref 3.8–10.5)

## 2019-06-27 PROCEDURE — 93970 EXTREMITY STUDY: CPT | Mod: 26

## 2019-06-27 PROCEDURE — 99233 SBSQ HOSP IP/OBS HIGH 50: CPT

## 2019-06-27 PROCEDURE — 71045 X-RAY EXAM CHEST 1 VIEW: CPT | Mod: 26

## 2019-06-27 RX ORDER — SIMETHICONE 80 MG/1
80 TABLET, CHEWABLE ORAL THREE TIMES A DAY
Refills: 0 | Status: DISCONTINUED | OUTPATIENT
Start: 2019-06-27 | End: 2019-07-02

## 2019-06-27 RX ADMIN — Medication 650 MILLIGRAM(S): at 22:30

## 2019-06-27 RX ADMIN — Medication 4 MILLIGRAM(S): at 22:28

## 2019-06-27 RX ADMIN — Medication 25 MILLIGRAM(S): at 11:12

## 2019-06-27 RX ADMIN — ENOXAPARIN SODIUM 80 MILLIGRAM(S): 100 INJECTION SUBCUTANEOUS at 16:34

## 2019-06-27 RX ADMIN — Medication 5 MILLIGRAM(S): at 17:20

## 2019-06-27 RX ADMIN — CEFEPIME 1000 MILLIGRAM(S): 1 INJECTION, POWDER, FOR SOLUTION INTRAMUSCULAR; INTRAVENOUS at 11:13

## 2019-06-27 RX ADMIN — ENOXAPARIN SODIUM 80 MILLIGRAM(S): 100 INJECTION SUBCUTANEOUS at 02:09

## 2019-06-27 RX ADMIN — CEFEPIME 1000 MILLIGRAM(S): 1 INJECTION, POWDER, FOR SOLUTION INTRAMUSCULAR; INTRAVENOUS at 22:28

## 2019-06-27 RX ADMIN — Medication 650 MILLIGRAM(S): at 16:31

## 2019-06-27 RX ADMIN — SIMETHICONE 80 MILLIGRAM(S): 80 TABLET, CHEWABLE ORAL at 23:03

## 2019-06-27 RX ADMIN — VALSARTAN 160 MILLIGRAM(S): 80 TABLET ORAL at 11:13

## 2019-06-27 RX ADMIN — Medication 81 MILLIGRAM(S): at 11:12

## 2019-06-27 RX ADMIN — Medication 5 MILLIGRAM(S): at 12:19

## 2019-06-27 NOTE — PROGRESS NOTE ADULT - PROBLEM SELECTOR PLAN 5
Seems likely due to acute on chronic pulmonary embolus.  Continue treatment for this and will reassess in future as if remains elevated may need additional treatment.

## 2019-06-27 NOTE — PROGRESS NOTE ADULT - ASSESSMENT
Pt is an 84 y/o male with PMHx of HTN, BPH, diverticulosis, prostate CA, tubular adenoma who presents to the ED via EMS after a recurrent  fall this afternoon.    Pts reports he fell at 6pm last night and again today at 1pm while he was trying to urinate in the bathroom.  He denies LOC and reports chills, extreme weakness  and fatigue for a few  days, also assoc  bifrontal mild headache, 1-2 loose stool diarrhea and decreased appetite/PO intake. Pt saw Dr. Nik Zimmerman at 10 am this morning ,  and was prescribed cefuroxime for  chest congestion and  a sinus infection.     In the ED, pt was noted to be febrile with a rectal temp 103F.  He  denied neck pain, back pain, bilateral lower extremities pain. He reports 1 week of a non-productive cough.  No SOB or chest pain.  No hx of renal problems.  On 81mg every other day.      In the ED, he received Sepsis antibiotics with 2gm Cefepime and Vanco 1gm,  and 2400 ml of LR, 650mg tylenol.    EMERGENCY consult was called this evening. I spoke with Dr Erika walls case was discussed,, I also spoke with RN staff and data obtained,   I asked for ICU eval / ABG / EKG and came to see him along with Dr Najera from ICU.  Hx obtained from pt and family as well as RN staff on 3 north at bedside.  In summary pt was admitted with DX RUL pneumonia with inital ct chest done with IV contrast but it was not PE protocol followed by episodes of dizziness and worsened hypoxemia and earlier today with pt collapse.  CTA with PE protocol done confirmed bilateral extensive PE and Pulm infarct pattern seen RUL, LLL  pt is tachypneic but no pleuritic pain / no hemoptysis and remains on o2 nc.  now will be moved to ICU step down unit.  lovenox started earlier today  ECHO data noted and discussed  Wife and DTR are at bedside Dr Jing Rouse [phone]  no hx DVT / PE  used to be very active    assessment / plan:  Acute Hypoxemic respirator failure  Acute on chronic PE [place]  Moderate-severe pulm HTn due to above  Hemodynamically stable  Increased A-a gradient on ABG / adeq oxygenation now on 4-5 liters nc and declines O2 mask  pneumonia related sxs on admission with residual findings on ct scan  Elevated tropnin likely due to demand ischemia / no EKG changes  no clinical indication presently for thrombolysis and family declined this modality with incrased risk of bleeding discussed  T 12 compression fx  small bilateral pleural effusions R>L        Lovenox dose appropriate  Monitor resp status and Oxygen saturation carefully  ICU eval with Dr Najera appreciated and all findings with increased risk of morbidity and mortality with this dx explained to family  Thrombophilia workup necessary but can be done later as it will not   repeat labs and will consider switch antibiotics if needed after discussion with ID  cardiology consult also recommended for am  repeat CBC and all labs in am  ABG and cxr in am  IVF is discontinued  heme eval appropriate for workup and choice of AC in view of having prostate Ca and likely acute on chronic PE  cont Lovenox therapy for now  plan for repeat ECHO on Monday  data discussed with his cardiologist Dr Lino this am

## 2019-06-27 NOTE — PROVIDER CONTACT NOTE (CHANGE IN STATUS NOTIFICATION) - ACTION/TREATMENT ORDERED:
MD James - pt next dose of lovenox at 16:30pm, and pt can get oob at this time.  Pos DVT b/l legs per MD James

## 2019-06-27 NOTE — PROGRESS NOTE ADULT - ASSESSMENT
83M w/ PMHx of HTN, prostate cancer, BPH, tubular adenoma, and diverticulosis who presented w/ recurrent falls admitted for sepsis secondary to PNA with hospital course complicated by PE during hospitalization     #Sepsis 2/2 PNA   - likely bacterial CAP  - still having fevers  - CT: RUL consolidation  - Repeat CXR: near-total resolutin of RUL infiltrate  - ID recs appreciated: continue ceftriaxone (D5) and today is last day of azithromycin (D5)  - continue tylenol for fever  - urine culture negative  - blood culture: NGTD  - stool culture: NGTD  - legionella negative  - encourage PO intake    #Severe PE  - w/ mod-severe pulm HTN and respiratory alkalosis   - lovenox 80 mg BID  - PE occurred during admission given negative CT w/ contrast (though not PE protocol), despite heparin DVT PPx  - no thrombolysis given pt is hemodynamically stable   - continue to monitor vitals  - continue to monitor on SD  - discussed thrombophilia work up with family, with the exception of malignancy, other causes will not  nor will they have treatements   - repeat echo on Monday     #Elevated troponins  - likely due to PE  - cardio recs appreciated, no cath at this time    #HTN  - d/c fluids  - DASH diet  - continue toprol 25 mg daily   - restart valsartan tomorrow (held due to repeat contrast study)  - may give hydralazine PRN    #Blastocystis on stool O&P  - possibly normal variant  - no evidence-based treatment  - treatment w/ flagyl will be made via shared decision making    #1.2cm incidental left renal lesion  - urology recs appreciated: f/u outpatient     #DVT PPx  - On therapeutic lovenox

## 2019-06-27 NOTE — PROGRESS NOTE ADULT - ASSESSMENT
82 y/o male with h/o HTN, BPH, diverticulosis, prostate CA, tubular adenoma was admitted on 6/22 for increased weakness and recurrent  falls. Pts reports he fell at 6pm the night PTA and again the next day while he was trying to urinate in the bathroom.  He denies LOC, but reports chills, extreme weakness and fatigue for a few  days, also assoc bifrontal mild headache, 1-2 loose stools and decreased appetite/PO intake. Pt saw Dr. Nik Zimmerman earlier on the day of admission and was prescribed cefuroxime for chest congestion and  a sinus infection. In ER he was noted febrile with a rectal temp 103F. He received cefepime and Vanco 1gm, then ceftriaxone and azithromycin.    1. Febrile syndrome resolved. RUL pneumonia. Syncopal episode. B/L PE. CRF stage 3  -dyspnea is improving  -leukocytosis resolved  -renal function improved  - BC x 2 reviewed  -on cefepime 1 gm IV 12h and azithromycin 500 mg IV qd # 5  -tolerating abx well so far; no side effects noted  -d/c azithromycin  -continue abx coverage with cefepime for now  -hydration  -monitor temps  -f/u CBC  -supportive care  2. Other issues:   -care per medicine

## 2019-06-28 DIAGNOSIS — I26.09 OTHER PULMONARY EMBOLISM WITH ACUTE COR PULMONALE: ICD-10-CM

## 2019-06-28 DIAGNOSIS — I82.4Y3 ACUTE EMBOLISM AND THROMBOSIS OF UNSPECIFIED DEEP VEINS OF PROXIMAL LOWER EXTREMITY, BILATERAL: ICD-10-CM

## 2019-06-28 DIAGNOSIS — C61 MALIGNANT NEOPLASM OF PROSTATE: ICD-10-CM

## 2019-06-28 LAB
ANION GAP SERPL CALC-SCNC: 11 MMOL/L — SIGNIFICANT CHANGE UP (ref 5–17)
BUN SERPL-MCNC: 21 MG/DL — SIGNIFICANT CHANGE UP (ref 7–23)
CALCIUM SERPL-MCNC: 8.8 MG/DL — SIGNIFICANT CHANGE UP (ref 8.5–10.1)
CHLORIDE SERPL-SCNC: 112 MMOL/L — HIGH (ref 96–108)
CO2 SERPL-SCNC: 23 MMOL/L — SIGNIFICANT CHANGE UP (ref 22–31)
CREAT SERPL-MCNC: 0.96 MG/DL — SIGNIFICANT CHANGE UP (ref 0.5–1.3)
GLUCOSE SERPL-MCNC: 99 MG/DL — SIGNIFICANT CHANGE UP (ref 70–99)
HCT VFR BLD CALC: 38.2 % — LOW (ref 39–50)
HGB BLD-MCNC: 12.9 G/DL — LOW (ref 13–17)
MCHC RBC-ENTMCNC: 29.9 PG — SIGNIFICANT CHANGE UP (ref 27–34)
MCHC RBC-ENTMCNC: 33.8 GM/DL — SIGNIFICANT CHANGE UP (ref 32–36)
MCV RBC AUTO: 88.4 FL — SIGNIFICANT CHANGE UP (ref 80–100)
PLATELET # BLD AUTO: 263 K/UL — SIGNIFICANT CHANGE UP (ref 150–400)
POTASSIUM SERPL-MCNC: 3.4 MMOL/L — LOW (ref 3.5–5.3)
POTASSIUM SERPL-SCNC: 3.4 MMOL/L — LOW (ref 3.5–5.3)
RBC # BLD: 4.32 M/UL — SIGNIFICANT CHANGE UP (ref 4.2–5.8)
RBC # FLD: 14.4 % — SIGNIFICANT CHANGE UP (ref 10.3–14.5)
SODIUM SERPL-SCNC: 146 MMOL/L — HIGH (ref 135–145)
WBC # BLD: 12.16 K/UL — HIGH (ref 3.8–10.5)
WBC # FLD AUTO: 12.16 K/UL — HIGH (ref 3.8–10.5)

## 2019-06-28 PROCEDURE — 99233 SBSQ HOSP IP/OBS HIGH 50: CPT

## 2019-06-28 RX ORDER — POTASSIUM CHLORIDE 20 MEQ
40 PACKET (EA) ORAL EVERY 4 HOURS
Refills: 0 | Status: COMPLETED | OUTPATIENT
Start: 2019-06-28 | End: 2019-06-28

## 2019-06-28 RX ADMIN — ENOXAPARIN SODIUM 80 MILLIGRAM(S): 100 INJECTION SUBCUTANEOUS at 06:45

## 2019-06-28 RX ADMIN — Medication 81 MILLIGRAM(S): at 13:52

## 2019-06-28 RX ADMIN — ENOXAPARIN SODIUM 80 MILLIGRAM(S): 100 INJECTION SUBCUTANEOUS at 17:49

## 2019-06-28 RX ADMIN — Medication 40 MILLIEQUIVALENT(S): at 23:39

## 2019-06-28 RX ADMIN — CEFEPIME 1000 MILLIGRAM(S): 1 INJECTION, POWDER, FOR SOLUTION INTRAMUSCULAR; INTRAVENOUS at 07:51

## 2019-06-28 RX ADMIN — VALSARTAN 160 MILLIGRAM(S): 80 TABLET ORAL at 07:45

## 2019-06-28 RX ADMIN — Medication 25 MILLIGRAM(S): at 06:48

## 2019-06-28 RX ADMIN — Medication 650 MILLIGRAM(S): at 07:20

## 2019-06-28 RX ADMIN — Medication 40 MILLIEQUIVALENT(S): at 17:50

## 2019-06-28 RX ADMIN — CEFEPIME 1000 MILLIGRAM(S): 1 INJECTION, POWDER, FOR SOLUTION INTRAMUSCULAR; INTRAVENOUS at 23:40

## 2019-06-28 RX ADMIN — Medication 4 MILLIGRAM(S): at 23:39

## 2019-06-28 RX ADMIN — SIMETHICONE 80 MILLIGRAM(S): 80 TABLET, CHEWABLE ORAL at 23:39

## 2019-06-28 NOTE — PROGRESS NOTE ADULT - ASSESSMENT
Pt is an 82 y/o male with PMHx of HTN, BPH, diverticulosis, prostate CA, tubular adenoma who presents to the ED via EMS after a recurrent  fall this afternoon.    Pts reports he fell at 6pm last night and again today at 1pm while he was trying to urinate in the bathroom.  He denies LOC and reports chills, extreme weakness  and fatigue for a few  days, also assoc  bifrontal mild headache, 1-2 loose stool diarrhea and decreased appetite/PO intake. Pt saw Dr. Nik Zimmerman at 10 am this morning ,  and was prescribed cefuroxime for  chest congestion and  a sinus infection.     In the ED, pt was noted to be febrile with a rectal temp 103F.  He  denied neck pain, back pain, bilateral lower extremities pain. He reports 1 week of a non-productive cough.  No SOB or chest pain.  No hx of renal problems.  On 81mg every other day.      In the ED, he received Sepsis antibiotics with 2gm Cefepime and Vanco 1gm,  and 2400 ml of LR, 650mg tylenol.    EMERGENCY consult was called this evening. I spoke with Dr Erika walls case was discussed,, I also spoke with RN staff and data obtained,   I asked for ICU eval / ABG / EKG and came to see him along with Dr Najera from ICU.  Hx obtained from pt and family as well as RN staff on 3 north at bedside.  In summary pt was admitted with DX RUL pneumonia with inital ct chest done with IV contrast but it was not PE protocol followed by episodes of dizziness and worsened hypoxemia and earlier today with pt collapse.  CTA with PE protocol done confirmed bilateral extensive PE and Pulm infarct pattern seen RUL, LLL  pt is tachypneic but no pleuritic pain / no hemoptysis and remains on o2 nc.  now will be moved to ICU step down unit.  lovenox started earlier today  ECHO data noted and discussed  Wife and DTR are at bedside Dr Jing Rouse [phone]  no hx DVT / PE  used to be very active    assessment / plan:  Acute Hypoxemic respirator failure  Acute on chronic PE [place]  Moderate-severe pulm HTn due to above  Hemodynamically stable  Increased A-a gradient on ABG / adeq oxygenation now on 4-5 liters nc and declines O2 mask / repeat ABGH unchanged  ABG - ( 27 Jun 2019 09:04 )  pH, Arterial: 7.49  pH, Blood: x     /  pCO2: 31    /  pO2: 64    / HCO3: 24    / Base Excess: 1.3   /  SaO2: 93      pneumonia related sxs on admission with residual findings on ct scan  Elevated tropnin likely due to demand ischemia / no EKG changes  no clinical indication presently for thrombolysis , LV and RV function stable  T 12 compression fx  small bilateral pleural effusions R>L  Bilateral DVT on dopplers  CXR shows improvement in RUL infiltrate        Lovenox dose appropriate  Monitor resp status and Oxygen saturation carefully  ICU eval with Dr Najera appreciated and all findings with increased risk of morbidity and mortality with this dx explained to family  Thrombophilia workup necessary but can be done later as it will not   complete course of cefepime / Zithromax discontinued    heme eval appropriate for workup and choice of AC in view of having prostate Ca and likely acute on chronic PE  cont Lovenox therapy for now  plan for repeat ECHO on Monday  data discussed with his cardiologist Dr Lino this am    I called Dr Jing Mariee [phone] for updates    I'll be away and Dr Luque will be covering, discussed with pt today.

## 2019-06-28 NOTE — CONSULT NOTE ADULT - SUBJECTIVE AND OBJECTIVE BOX
Patient is a 83y old  Male who presents with a chief complaint of Fever  RUL Pna  Weakness  Fall  Abd Distension  Overflow Incontinence/Urinary Urgency (2019 11:39)      HPI:  Pt is an 82 y/o male with PMHx of HTN, BPH, diverticulosis, prostate CA, tubular adenoma who presents to the ED via EMS after a recurrent  fall this afternoon.    Pts reports he fell at 6pm last night and again today at 1pm while he was trying to urinate in the bathroom.  He denies LOC and reports chills, extreme weakness  and fatigue for a few  days, also assoc  bifrontal mild headache, 1-2 loose stool diarrhea and decreased appetite/PO intake. Pt saw Dr. Nik Zimmerman at 10 am this morning ,  and was prescribed cefuroxime for  chest congestion and  a sinus infection.     In the ED, pt was noted to be febrile with a rectal temp 103F.  He  denied neck pain, back pain, bilateral lower extremities pain. He reports 1 week of a non-productive cough.  No SOB or chest pain.  No hx of renal problems.  On 81mg every other day.      In the ED, he received Sepsis antibiotics with 2gm Cefepime and Vanco 1gm,  and 2400 ml of LR, 650mg tylenol.    Fam Hx:  Father  at 70 , Multiple Myeloma  Mother  at 91, Dementia (2019 20:26)      PAST MEDICAL & SURGICAL HISTORY:  Diverticulosis: sigmoid  Tubular adenoma  BPH (Benign Prostatic Hyperplasia)  History of prostate cancer: seed implant 2004  HTN (hypertension)  Sensorineural hearing loss: bilateral  Rhinitis  Deviated nasal septum  History of atypical nevus  S/P cataract surgery: left eye  Prostate ca: with seed implantation 2004  Mastoid disorder  History of tonsillectomy  History of rhinoplasty      REVIEW OF SYSTEMS    General:	The patient feels well.  no unexpected weight loss. Appetite good. no night sweats.  Skin: no Rash.	  ENT: no sore throat or oral pain. no difficulty with swallowing  Respiratory: No chest pain, No shortness of breath, no hemoptysis, no cough, no chest pain.  Cardiovascular : No chest pain. no palpitation.  Gastrointestinal:  No anorexia. no pain, no blood in stool.   Genitourinary: No hematuria , no dysuria	  Musculoskeletal: No myalgia, no arthralgia, no back pain, no joint swelling  Neurological: no headaches, no dizzyness, no weakness, no double vision. 	  Psychiatric: no change in mood. 	  Hematology/Lymphatics: no wakness. 	  Endocrine:	no burning in urine or frequency  Allergic/Immunologic:	 no fever.     Allergies    adhesives (Rash)  No Known Drug Allergies    Intolerances        Occupation:  [employer]: Denied Smoking: Denied Drug Use: Denied  Marital Status:         FAMILY HISTORY:      Home Medications:  Aspirin Enteric Coated 81 mg oral delayed release tablet: 1 tab(s) orally every other day (2019 00:11)  CEFUROXIME AXETIL 500 MG TABS:  (2019 00:11)  DOXAZOSIN MESYLATE 8 MG TABS: 0.5  orally once a day (at bedtime) (2019 00:11)  Fish Oil oral capsule:  (2019 00:11)  Metoprolol Succinate ER 25 mg oral tablet, extended release: 1 tab(s) orally once a day (2019 00:11)  PEG-3350/KCL SOL /SODIUM:  (2019 00:11)  VALSART/HCTZ -25MG:  (2019 00:11)  Vitamin B-12:  (2019 00:11)  Vitamin C:  (2019 00:11)  Vitamin D3 1000 intl units oral tablet: 1 tab(s) orally once a day (2019 00:11)      MEDICATIONS  (STANDING):  aspirin enteric coated 81 milliGRAM(s) Oral daily  cefepime  Injectable.      cefepime  Injectable. 1000 milliGRAM(s) IV Push every 12 hours  doxazosin 4 milliGRAM(s) Oral at bedtime  enoxaparin Injectable 80 milliGRAM(s) SubCutaneous every 12 hours  furosemide   Injectable 20 milliGRAM(s) IV Push once  metoprolol succinate ER 25 milliGRAM(s) Oral daily  valsartan 160 milliGRAM(s) Oral daily    MEDICATIONS  (PRN):  acetaminophen   Tablet .. 650 milliGRAM(s) Oral every 6 hours PRN Temp greater or equal to 38C (100.4F)  hydrALAZINE Injectable 5 milliGRAM(s) IV Push every 1 hour PRN SBP > 180  simethicone 80 milliGRAM(s) Chew three times a day PRN Gas      PHYSICAL EXAM:  Vital Signs Last 24 Hrs  T(C): 37 (2019 08:42), Max: 37.8 (2019 18:29)  T(F): 98.6 (2019 08:42), Max: 100 (2019 18:29)  HR: 78 (2019 12:00) (69 - 110)  BP: 147/85 (2019 12:00) (130/77 - 176/99)  BP(mean): 104 (2019 12:00) (86 - 120)  RR: 25 (2019 12:00) (17 - 33)  SpO2: 96% (2019 12:00) (92% - 97%)  Gen: well developed, well nourished, comfortable  HEENT: normocephalic/atraumatic, no conjunctival pallor, no scleral icterus, no oral thrush/mucosal bleeding/mucositis  Neck: supple, no masses, no JVD  Breasts: deferred  Back: nontender  Cardiovascular: heart shounds Normal S1S2, no murmurs/rubs/gallops  Respiratory: air entry normal and equal on both sides. no wheeze, no ronchi,   Gastrointestinal: BS+, soft, NT/ND, no masses, no splenomegaly, no hepatomegaly,   no evidence for ascites  Genitourinary: deferred  Rectal: deferred  Extremities: no clubbing/cyanosis, no edema, no calf tenderness  Neurological:  Alert oriented X3 cranial nerves normal. no focal deficits  Skin: no rash on visible skin  Lymph Nodes:  no cervical/supraclavicular LAD, no axillary/groin LAD  Musculoskeletal:  full ROM  Psychiatric:  mood appears normal. not obviously anxious or depressed.        LABS:                        12.9   12.16 )-----------( 263      ( 2019 07:02 )             38.2     2019 07:02    146    |  112    |  21     ----------------------------<  99     3.4     |  23     |  0.96     Ca    8.8        2019 07:02  Phos  4.2       2019 06:11    TPro  6.2    /  Alb  2.3    /  TBili  0.4    /  DBili  x      /  AST  71     /  ALT  67     /  AlkPhos  52     2019 06:11    LIVER FUNCTIONS - ( 2019 06:11 )  Alb: 2.3 g/dL / Pro: 6.2 gm/dL / ALK PHOS: 52 U/L / ALT: 67 U/L / AST: 71 U/L / GGT: x               Culture - Blood (collected 19 @ 14:55)  Source: .Blood Blood-Peripheral  Final Report (19 @ 20:01):    No growth at 5 days.    Culture - Blood (collected 19 @ 14:45)  Source: .Blood Blood-Peripheral  Final Report (19 @ 20:01):    No growth at 5 days.        RADIOLOGY & ADDITIONAL STUDIES: Patient is a 83y old  Male who presents with a chief complaint of Fever  RUL Pna  Weakness  Fall  Abd Distension  Overflow Incontinence/Urinary Urgency (2019 11:39)      HPI:  Pt is an 82 y/o male with PMHx of HTN, BPH, diverticulosis, prostate CA, tubular adenoma who presents to the ED via EMS after a recurrent  falls  he was found to have right sided pneumonia on evaluation for his fever. the CTA that was done also showed extensive bilateral PE and bilateral DVTs  he is currently on lovenox  he has a history of prostate cancer , localized s/p RT seeds . no metastasis last PSA 0.3 as per patietn  consult requested for hypercoagulable state workup and choice of anticoagulation given history of cancer      Fam Hx:  Father  at 70 , Multiple Myeloma  Mother  at 91, Dementia (2019 20:26)      PAST MEDICAL & SURGICAL HISTORY:  Diverticulosis: sigmoid  Tubular adenoma  BPH (Benign Prostatic Hyperplasia)  History of prostate cancer: seed implant 2004  HTN (hypertension)  Sensorineural hearing loss: bilateral  Rhinitis  Deviated nasal septum  History of atypical nevus  S/P cataract surgery: left eye  Prostate ca: with seed implantation 2004  Mastoid disorder  History of tonsillectomy  History of rhinoplasty      REVIEW OF SYSTEMS    General:	  weak  short of breath on exertion  cough  fever        adhesives (Rash)  No Known Drug Allergies    Intolerances      FAMILY HISTORY:    negative for DVT/PE        Home Medications:  Aspirin Enteric Coated 81 mg oral delayed release tablet: 1 tab(s) orally every other day (2019 00:11)  CEFUROXIME AXETIL 500 MG TABS:  (2019 00:11)  DOXAZOSIN MESYLATE 8 MG TABS: 0.5  orally once a day (at bedtime) (2019 00:11)  Fish Oil oral capsule:  (2019 00:11)  Metoprolol Succinate ER 25 mg oral tablet, extended release: 1 tab(s) orally once a day (2019 00:11)  PEG-3350/KCL SOL /SODIUM:  (2019 00:11)  VALSART/HCTZ -25MG:  (2019 00:11)  Vitamin B-12:  (2019 00:11)  Vitamin C:  (2019 00:11)  Vitamin D3 1000 intl units oral tablet: 1 tab(s) orally once a day (2019 00:11)      MEDICATIONS  (STANDING):  aspirin enteric coated 81 milliGRAM(s) Oral daily  cefepime  Injectable.      cefepime  Injectable. 1000 milliGRAM(s) IV Push every 12 hours  doxazosin 4 milliGRAM(s) Oral at bedtime  enoxaparin Injectable 80 milliGRAM(s) SubCutaneous every 12 hours  furosemide   Injectable 20 milliGRAM(s) IV Push once  metoprolol succinate ER 25 milliGRAM(s) Oral daily  valsartan 160 milliGRAM(s) Oral daily    MEDICATIONS  (PRN):  acetaminophen   Tablet .. 650 milliGRAM(s) Oral every 6 hours PRN Temp greater or equal to 38C (100.4F)  hydrALAZINE Injectable 5 milliGRAM(s) IV Push every 1 hour PRN SBP > 180  simethicone 80 milliGRAM(s) Chew three times a day PRN Gas      PHYSICAL EXAM:  Vital Signs Last 24 Hrs  T(C): 37 (2019 08:42), Max: 37.8 (2019 18:29)  T(F): 98.6 (2019 08:42), Max: 100 (2019 18:29)  HR: 78 (2019 12:00) (69 - 110)  BP: 147/85 (2019 12:00) (130/77 - 176/99)  BP(mean): 104 (2019 12:00) (86 - 120)  RR: 25 (2019 12:00) (17 - 33)  SpO2: 96% (2019 12:00) (92% - 97%)      Gen:  appears comfortable   slight nosebleed  chest crackles+   wheeze  edema+        LABS:                        12.9   12.16 )-----------( 263      ( 2019 07:02 )             38.2     2019 07:02    146    |  112    |  21     ----------------------------<  99     3.4     |  23     |  0.96     Ca    8.8        2019 07:02  Phos  4.2       2019 06:11    TPro  6.2    /  Alb  2.3    /  TBili  0.4    /  DBili  x      /  AST  71     /  ALT  67     /  AlkPhos  52     2019 06:11    LIVER FUNCTIONS - ( 2019 06:11 )  Alb: 2.3 g/dL / Pro: 6.2 gm/dL / ALK PHOS: 52 U/L / ALT: 67 U/L / AST: 71 U/L / GGT: x               Culture - Blood (collected 19 @ 14:55)  Source: .Blood Blood-Peripheral  Final Report (19 @ 20:01):    No growth at 5 days.    Culture - Blood (collected 19 @ 14:45)  Source: .Blood Blood-Peripheral  Final Report (19 @ 20:01):    No growth at 5 days.        RADIOLOGY & ADDITIONAL STUDIES:    IMPRESSION:  Extensive bilateral pulmonary embolism.    This finding was discussed with Dr. Botello on 2019, shortly   after the examination was performed.    Enlarged main pulmonary artery and straightening of the interventricular   septum suggestive of right heart strain.    Right upper lobe pulmonary infarct. Probable small left lower lobe   pulmonary infarct.    Bilateral indeterminate renal lesions. Recommend MRI to further evaluate.    Duplex    IMPRESSION:     Acute deep venous thrombosis involving the right posterior tibial vein   and left peroneal vein.    Acute deep venous thrombosis: below the knee.    These findings were discussed with Dr. BRIANA ROA  at 2019   11:24 AM  who communicates understanding.    bilateral DVT      CT abd      IMPRESSION:     No acute traumatic injury.     Age-indeterminate mild superior endplate compression fracture at T12.    Posterior right upper lobe consolidation, likely pneumonia. Follow-up to   complete resolution.    1.2 cm indeterminate left renal lesion. Follow-up nonemergent MRI abdomen   with and without contrast.

## 2019-06-28 NOTE — PROGRESS NOTE ADULT - ASSESSMENT
83M w/ PMHx of HTN, prostate cancer, BPH, tubular adenoma, and diverticulosis who presented w/ recurrent falls admitted for sepsis secondary to PNA with hospital course complicated by PE during hospitalization     #Sepsis 2/2 PNA   - CT: RUL consolidation  -Continue IV cefepime  - Cristhian follow up appreciated    #B/L extensie PE-possibly acute on chr  -B/L DVT  - w/ mod-severe pulm HTN    -Continue lovenox 80 mg BID  -Dr Chowdhury follow up appreciated  -Hypercoagulable work up-can be done as an outpatient  -Dr DAMION Bowie consult appreciated    #Elevated troponins  - likely due to PE  -Demand ischemia  - cardio recs appreciated, no cath at this time    #HTN  - continue toprol and diovan    #1.2cm incidental left renal lesion  - urology recs appreciated: f/u outpatient     #DVT PPx  - On therapeutic lovenox

## 2019-06-28 NOTE — CONSULT NOTE ADULT - PROBLEM SELECTOR RECOMMENDATION 9
possibly due to multifactorial , pneumonia , superimposed with acute diastolic heart failure due to fluid overload ,uncontrolled hypertension , now clinically better with diuretic , discontinue IV fluid    will obtain follow up echo , ekg ,  pro bnp ,  troponin
complicating pneumonia  the patient can be treated with either warfarin or a NOAD given that he has no history of metastatic cancer  hypercoagulable workup as  OP in 6 weeks  rec evaluation by AC team

## 2019-06-29 LAB
ANION GAP SERPL CALC-SCNC: 10 MMOL/L — SIGNIFICANT CHANGE UP (ref 5–17)
BUN SERPL-MCNC: 27 MG/DL — HIGH (ref 7–23)
CALCIUM SERPL-MCNC: 8.8 MG/DL — SIGNIFICANT CHANGE UP (ref 8.5–10.1)
CHLORIDE SERPL-SCNC: 114 MMOL/L — HIGH (ref 96–108)
CO2 SERPL-SCNC: 22 MMOL/L — SIGNIFICANT CHANGE UP (ref 22–31)
CREAT SERPL-MCNC: 1.03 MG/DL — SIGNIFICANT CHANGE UP (ref 0.5–1.3)
GLUCOSE SERPL-MCNC: 106 MG/DL — HIGH (ref 70–99)
HCT VFR BLD CALC: 38 % — LOW (ref 39–50)
HGB BLD-MCNC: 12.8 G/DL — LOW (ref 13–17)
MCHC RBC-ENTMCNC: 30.3 PG — SIGNIFICANT CHANGE UP (ref 27–34)
MCHC RBC-ENTMCNC: 33.7 GM/DL — SIGNIFICANT CHANGE UP (ref 32–36)
MCV RBC AUTO: 89.8 FL — SIGNIFICANT CHANGE UP (ref 80–100)
PLATELET # BLD AUTO: 269 K/UL — SIGNIFICANT CHANGE UP (ref 150–400)
POTASSIUM SERPL-MCNC: 4.1 MMOL/L — SIGNIFICANT CHANGE UP (ref 3.5–5.3)
POTASSIUM SERPL-SCNC: 4.1 MMOL/L — SIGNIFICANT CHANGE UP (ref 3.5–5.3)
RBC # BLD: 4.23 M/UL — SIGNIFICANT CHANGE UP (ref 4.2–5.8)
RBC # FLD: 14.6 % — HIGH (ref 10.3–14.5)
SODIUM SERPL-SCNC: 146 MMOL/L — HIGH (ref 135–145)
WBC # BLD: 11.69 K/UL — HIGH (ref 3.8–10.5)
WBC # FLD AUTO: 11.69 K/UL — HIGH (ref 3.8–10.5)

## 2019-06-29 PROCEDURE — 99223 1ST HOSP IP/OBS HIGH 75: CPT

## 2019-06-29 PROCEDURE — 99233 SBSQ HOSP IP/OBS HIGH 50: CPT

## 2019-06-29 RX ORDER — IPRATROPIUM/ALBUTEROL SULFATE 18-103MCG
3 AEROSOL WITH ADAPTER (GRAM) INHALATION EVERY 6 HOURS
Refills: 0 | Status: DISCONTINUED | OUTPATIENT
Start: 2019-06-29 | End: 2019-07-02

## 2019-06-29 RX ORDER — LIDOCAINE 4 G/100G
1 CREAM TOPICAL DAILY
Refills: 0 | Status: DISCONTINUED | OUTPATIENT
Start: 2019-06-29 | End: 2019-07-02

## 2019-06-29 RX ORDER — METOPROLOL TARTRATE 50 MG
25 TABLET ORAL EVERY 12 HOURS
Refills: 0 | Status: DISCONTINUED | OUTPATIENT
Start: 2019-06-29 | End: 2019-07-02

## 2019-06-29 RX ORDER — VALSARTAN 80 MG/1
320 TABLET ORAL DAILY
Refills: 0 | Status: DISCONTINUED | OUTPATIENT
Start: 2019-06-29 | End: 2019-07-02

## 2019-06-29 RX ORDER — ACETAMINOPHEN 500 MG
650 TABLET ORAL EVERY 6 HOURS
Refills: 0 | Status: DISCONTINUED | OUTPATIENT
Start: 2019-06-29 | End: 2019-07-02

## 2019-06-29 RX ORDER — VALSARTAN 80 MG/1
160 TABLET ORAL ONCE
Refills: 0 | Status: COMPLETED | OUTPATIENT
Start: 2019-06-29 | End: 2019-06-29

## 2019-06-29 RX ADMIN — Medication 25 MILLIGRAM(S): at 18:30

## 2019-06-29 RX ADMIN — LIDOCAINE 1 PATCH: 4 CREAM TOPICAL at 20:55

## 2019-06-29 RX ADMIN — CEFEPIME 1000 MILLIGRAM(S): 1 INJECTION, POWDER, FOR SOLUTION INTRAMUSCULAR; INTRAVENOUS at 19:37

## 2019-06-29 RX ADMIN — Medication 81 MILLIGRAM(S): at 11:07

## 2019-06-29 RX ADMIN — VALSARTAN 160 MILLIGRAM(S): 80 TABLET ORAL at 11:07

## 2019-06-29 RX ADMIN — Medication 4 MILLIGRAM(S): at 21:05

## 2019-06-29 RX ADMIN — Medication 3 MILLILITER(S): at 18:59

## 2019-06-29 RX ADMIN — VALSARTAN 160 MILLIGRAM(S): 80 TABLET ORAL at 06:41

## 2019-06-29 RX ADMIN — Medication 650 MILLIGRAM(S): at 20:58

## 2019-06-29 RX ADMIN — Medication 25 MILLIGRAM(S): at 06:41

## 2019-06-29 RX ADMIN — Medication 650 MILLIGRAM(S): at 21:28

## 2019-06-29 RX ADMIN — ENOXAPARIN SODIUM 80 MILLIGRAM(S): 100 INJECTION SUBCUTANEOUS at 18:30

## 2019-06-29 RX ADMIN — ENOXAPARIN SODIUM 80 MILLIGRAM(S): 100 INJECTION SUBCUTANEOUS at 06:40

## 2019-06-29 RX ADMIN — Medication 5 MILLIGRAM(S): at 17:14

## 2019-06-29 RX ADMIN — CEFEPIME 1000 MILLIGRAM(S): 1 INJECTION, POWDER, FOR SOLUTION INTRAMUSCULAR; INTRAVENOUS at 06:40

## 2019-06-29 RX ADMIN — SIMETHICONE 80 MILLIGRAM(S): 80 TABLET, CHEWABLE ORAL at 19:37

## 2019-06-29 NOTE — PROGRESS NOTE ADULT - ASSESSMENT
83M w/ PMHx of HTN, prostate cancer, BPH, tubular adenoma, and diverticulosis who presented w/ recurrent falls admitted for sepsis secondary to PNA with hospital course complicated by PE during hospitalization     #Sepsis 2/2 PNA   - CT: RUL consolidation  -Continue IV cefepime  -Dr Morrow follow up appreciated    #B/L extensie PE-possibly acute on chr  -B/L DVT  - w/ mod-severe pulm HTN    -Continue lovenox 80 mg BID for now  -Pulmonary follow up appreciated  -Hypercoagulable work up-can be done as an outpatient  -Dr DAMION Bowie consult appreciated  -Consult AC    #Elevated troponins  - likely due to PE  -Demand ischemia  - cardio recs appreciated, no cath at this time    #HTN  - continue toprol and diovan    #1.2cm incidental left renal lesion  - urology recs appreciated: f/u outpatient     #DVT PPx  - On therapeutic lovenox

## 2019-06-29 NOTE — PROVIDER CONTACT NOTE (OTHER) - ACTION/TREATMENT ORDERED:
will add an additional 12.5 po metoprolol, cooling blanket, recheck bp in 1 hr
Md Montero ( jr resident following MD James ) called [facility] and she advised she would give him the message. Per MD Ferguson he could switch assignments with another hospitalist MD
give additional 325mg acetaminophen stat. monitor vs. monitor for ss of bleeding
no orders given at this time. continue to monitor. Dr Hedrick states that Dr James and resident team will be rounding on this patient shortly. will continue to monitor
tylenol ordered [id number], CBC,BMP ordered. will continue to monitor
MD Bowie made aware. Nebs ordered PRN for wheezing. STAT. additional dose of beta blocker ordered. STAT. will continue to monitor closely.

## 2019-06-29 NOTE — CONSULT NOTE ADULT - ASSESSMENT
A 84 y/o male with PMHx of HTN, BPH, diverticulosis, prostate CA, tubular adenoma who presents for sepsis 2/2 PNA and found to have acute b/l PE and right posterior tibial DVT and left peroneal DVT. As per heme/onc evaluated pt for non-metastatic prostate cancer and recommends either DOACs or warfarin. Discussed with pt and family at bedside about transitioning from lovenox to DOAC. Disks risks/benefits of each anticoag DVT treatment, its lifestyle modification, costs. They felt reluctant to change to oral AC since was informed that lovenox is optimal for cancer pts. Discussed this with hospitalist and will leave pt on lovenox for now. Family would also like for pt to learn lovenox self inj and get copay idea before going forward. If pt cannot self inject or if copay expensive, then agree to switch to Xarelto.     Plan:  for now c/w lovenox 80 mg SQ q12h. Asked primary RN to educate pt on self injection with tonight's dose and tomorrow AM dose  sent script to [facility] to have an idea of copay and it is $363.98. Will need to f/u with family. If they refuse I'd recommend Xarelto 15 mg PO BID x 21 days, then transition to 20 mg PO daily on day 22.   monitor CBC/BMP    Will continue to follow. Please call [phone] for questions

## 2019-06-29 NOTE — PROVIDER CONTACT NOTE (OTHER) - ASSESSMENT
pt reports feeling "more lethargic" today but is alert. oob to chair all day and had visitors throughout day at bedside.

## 2019-06-29 NOTE — CONSULT NOTE ADULT - SUBJECTIVE AND OBJECTIVE BOX
HPI: Pt is an 84 y/o male with PMHx of HTN, BPH, diverticulosis, prostate CA, tubular adenoma who presents to the ED via EMS after a recurrent  fall this afternoon.    Pts reports he fell at 6pm last night and again today at 1pm while he was trying to urinate in the bathroom.  He denies LOC and reports chills, extreme weakness  and fatigue for a few  days, also assoc  bifrontal mild headache, 1-2 loose stool diarrhea and decreased appetite/PO intake. Pt saw Dr. Nik Zimmerman at 10 am this morning ,  and was prescribed cefuroxime for  chest congestion and  a sinus infection. In the ED, pt was noted to be febrile with a rectal temp 103F.  He  denied neck pain, back pain, bilateral lower extremities pain. He reports 1 week of a non-productive cough.  No SOB or chest pain.  No hx of renal problems.  On 81mg every other day. In the ED, he received Sepsis antibiotics with 2gm Cefepime and Vanco 1gm,  and 2400 ml of LR, 650mg tylenol.    19: Pt seen at bedside, OOB to chair in [place] with wife and his 2 daughters. He is found to have acute b/l PE and right posterior tibial DVT and left peroneal DVT. Denies recent long travel, flight, no recent hospitalization or surgery. Denies blood clotting issues in past. Denies prior VTE. Heme/onc evaluated pt for non-metastatic prostate cancer and recommends either DOACs or warfarin. Discussed with pt and family at bedside about transitioning from lovenox to DOAC. Disks risks/benefits of each anticoag DVT treatment, its lifestyle modification, costs. They felt reluctant to change to oral AC since was informed that lovenox is optimal for cancer pts.     Consulted by Shankar De Souza  for VTE prophylaxis, risk stratification, and anticoagulation management.    PAST MEDICAL & SURGICAL HISTORY:  Diverticulosis: sigmoid  Tubular adenoma  BPH (Benign Prostatic Hyperplasia)  History of prostate cancer: seed implant 2004  HTN (hypertension)  Sensorineural hearing loss: bilateral  Rhinitis  Deviated nasal septum  History of atypical nevus  S/P cataract surgery: left eye  Prostate ca: with seed implantation 2004  Mastoid disorder  History of tonsillectomy  History of rhinoplasty    BMI: 29    CrCL: 59.99    IMPROVE VTE Individual Risk Assessment Score:          RISK                                                          Points  [ X ] Previous VTE                                                3  [  ] Thrombophilia                                             2  [  ] Lower limb paralysis                                   2        (unable to hold up >15 seconds)    [  ] Current Cancer                                             2         (within 6 months)  [  ] Immobilization > 24 hrs                              1  [  ] ICU/CCU stay > 24 hours                             1  [ X ] Age > 60                                                         1    IMPROVE VTE Score:         [  4  ]    Total Risk Factor Score:    0 - 1:   Consider IPC  >2 - 3:  Thromboprophylaxis required (enoxaparin or SQ heparin)        >4:   High Risk: Thromboprophylaxis required (enoxaparin or SQ heparin), optional add IPC  **If CONTRAINDICATION to enoxaparin or SQ heparin, USE IPCs**    IMPROVE Bleeding Risk Score: 2.5    Falls Risk:   High ( X )  Mod (  )  Low (  )    FAMILY HISTORY:  Father  at 70 , Multiple Myeloma  Mother  at 91, Dementia (2019 20:26)  Denies any personal or familial history of clotting or bleeding disorders.    Allergies  adhesives (Rash)  No Known Drug Allergies    Intolerances    REVIEW OF SYSTEMS    (  )Fever	     (  )Constipation	( X)SOB			(  )Headache	(  )Dysuria  (  )Chills	     (  )Melena	(  )Dyspnea present on exertion    (  )Dizziness                    (  )Polyuria  (  )Nausea	     (  )Hematochezia	(  )Cough		                    (  )Syncope   	(  )Hematuria  (  )Vomiting    (  )Chest Pain	(  )Wheezing		(  )Weakness  (  )Diarrhea     (  )Palpitations	(  )Anorexia		(  )Myalgia    All other review of systems negative: Yes    PHYSICAL EXAM:    Vital Signs Last 24 Hrs  T(C): 36.7 (2019 12:36), Max: 37.4 (2019 21:47)  T(F): 98 (2019 12:36), Max: 99.3 (2019 21:47)  HR: 78 (2019 15:00) (63 - 87)  BP: 155/94 (2019 15:00) (153/85 - 186/104)  BP(mean): 112 (2019 15:00) (104 - 125)  RR: 18 (2019 15:00) (13 - 30)  SpO2: 98% (2019 15:00) (92% - 98%)    Constitutional: Appears Well    Neurological: A& O x 3    Skin: Warm    Respiratory and Thorax: normal effort; Breath sounds: normal; No rales/wheezing/rhonchi  	  Cardiovascular: S1, S2, regular, NMBR	    Gastrointestinal: BS + x 4Q, nontender, distended, and firm	    Genitourinary:  Bladder nondistended, nontender    Musculoskeletal:   General Right:   no muscle/joint tenderness,   normal tone, no joint swelling,   ROM: full	    General Left:   no muscle/joint tenderness,   normal tone, no joint swelling,   ROM: full    Lower extrems:   Right: no calf tenderness              negative arnie's sign               + pedal pulses    Left:   no calf tenderness              negative arnie's sign               + pedal pulses    MEDICATIONS  (STANDING):  aspirin enteric coated 81 milliGRAM(s) Oral daily  cefepime  Injectable.      cefepime  Injectable. 1000 milliGRAM(s) IV Push every 12 hours  doxazosin 4 milliGRAM(s) Oral at bedtime  enoxaparin Injectable 80 milliGRAM(s) SubCutaneous every 12 hours  furosemide   Injectable 20 milliGRAM(s) IV Push once  metoprolol succinate ER 25 milliGRAM(s) Oral daily  valsartan 320 milliGRAM(s) Oral daily                          12.8   11.69 )-----------( 269      ( 2019 06:48 )             38.0     06-    146<H>  |  114<H>  |  27<H>  ----------------------------<  106<H>  4.1   |  22  |  1.03    Ca    8.8      2019 06:48    RADIOLOGY, EKG & ADDITIONAL TESTS: Reviewed.   QRS axis to [] ° and NSR at a rate of [] BPM. There was no atrial enlargement. There was no ventricular hypertrophy. There were no ST-T changes and all intervals were normal.    < from: CT Angio Chest PE Protocol w/ IV Cont (19 @ 12:42) >  IMPRESSION:  Extensive bilateral pulmonary embolism.  This finding was discussed with  on 2019, shortly after the examination was performed.  Enlarged main pulmonary artery and straightening of the interventricular septum suggestive of right heart strain.  Right upper lobe pulmonary infarct. Probable small left lower lobe pulmonary infarct.  Bilateral indeterminate renal lesions. Recommend MRI to further evaluate.    < from: US Duplex Venous Lower Ext Complete, Bilateral (19 @ 11:07) >  IMPRESSION:   Acute deep venous thrombosis involving the right posterior tibial vein and left peroneal vein.  Acute deep venous thrombosis: below the knee.    DVT Prophylaxis:  LMWH                   ( X )  Heparin SQ           (  )  Coumadin             (  )  Xarelto                  (  )  Eliquis                   (  )  Venodynes           ( X )  Ambulation          ( X )  UFH                       (  )  Contraindicated  (  )  ASA                       (   )

## 2019-06-29 NOTE — PROGRESS NOTE ADULT - ASSESSMENT
84 y/o male with PMHx of HTN, BPH, diverticulosis, prostate CA, tubular adenoma who presents to the ED via EMS after a recurrent  fall this afternoon.    Pts reports he fell at 6pm last night and again today at 1pm while he was trying to urinate in the bathroom.  He denies LOC and reports chills, extreme weakness  and fatigue for a few  days, also assoc  bifrontal mild headache, 1-2 loose stool diarrhea and decreased appetite/PO intake. Pt saw Dr. Nik Zimmerman at 10 am this morning ,  and was prescribed cefuroxime for  chest congestion and  a sinus infection.  ED, he received Sepsis antibiotics with 2gm Cefepime and Vanco 1gm,  and 2400 ml of LR, 650mg tylenol.  EMERGENCY consult was called this evening. I spoke with Dr Erika walls case was discussed,, I also spoke with RN staff and data obtained,   I asked for ICU eval / ABG / EKG and came to see him along with Dr Najera from ICU.  Hx obtained from pt and family as well as RN staff on 3 north at bedside.  In summary pt was admitted with DX RUL pneumonia with inital ct chest done with IV contrast but it was not PE protocol followed by episodes of dizziness and worsened hypoxemia and earlier today with pt collapse.  CTA with PE protocol done confirmed bilateral extensive PE and Pulm infarct pattern seen RUL, LLL  pt is tachypneic but no pleuritic pain / no hemoptysis and remains on o2 nc.  now will be moved to ICU step down unit.  lovenox started earlier today  ECHO data noted and discussed  Wife and DTR are at bedside Dr Jing Rouse [phone]  no hx DVT / PE  used to be very active    assessment / plan:  Acute Hypoxemic respirator failure  Acute on chronic PE [place]  Moderate-severe pulm HTn due to above  Hemodynamically stable  Increased A-a gradient on ABG / adeq oxygenation now on 4-5 liters nc and declines O2 mask / repeat ABGH unchanged  ABG - ( 27 Jun 2019 09:04 )  pH, Arterial: 7.49  pH, Blood: x     /  pCO2: 31    /  pO2: 64    / HCO3: 24    / Base Excess: 1.3   /  SaO2: 93      pneumonia related sxs on admission with residual findings on ct scan  Elevated tropnin likely due to demand ischemia / no EKG changes  no clinical indication presently for thrombolysis , LV and RV function stable  T 12 compression fx  small bilateral pleural effusions R>L  Bilateral DVT on dopplers  CXR shows improvement in RUL infiltrate  Monitor resp status and Oxygen saturation carefully  ICU eval with Dr Najera appreciated and all findings with increased risk of morbidity and mortality with this dx explained to family  Thrombophilia workup necessary but can be done later as it will not   heme eval appropriate for workup and choice of AC in view of having prostate Ca and likely acute on chronic PE  Lovenox therapy for now  Plan for repeat ECHO on Monday  Contact information: Jing Mariee [phone] for updates  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

## 2019-06-29 NOTE — PROVIDER CONTACT NOTE (OTHER) - RECOMMENDATIONS
cooling blanket, motrin or additional tylenol dose, repeat labwork
pt increased to 4 L nasal cannula during episode although sats no lower than 90% on exertion.
readjust bp medication, initiate cooling blanket or medication for temp

## 2019-06-29 NOTE — PROVIDER CONTACT NOTE (OTHER) - BACKGROUND
Dr. Luque cell [phone]
pt being treated for PNA and with b/l PE's on anticoagulation.
patient not getting full dose of bp medication he is taking at home.

## 2019-06-30 PROCEDURE — 99233 SBSQ HOSP IP/OBS HIGH 50: CPT

## 2019-06-30 RX ORDER — RIVAROXABAN 15 MG-20MG
15 KIT ORAL
Refills: 0 | Status: DISCONTINUED | OUTPATIENT
Start: 2019-06-30 | End: 2019-07-02

## 2019-06-30 RX ORDER — OXYCODONE HYDROCHLORIDE 5 MG/1
5 TABLET ORAL ONCE
Refills: 0 | Status: DISCONTINUED | OUTPATIENT
Start: 2019-06-30 | End: 2019-06-30

## 2019-06-30 RX ADMIN — OXYCODONE HYDROCHLORIDE 5 MILLIGRAM(S): 5 TABLET ORAL at 00:49

## 2019-06-30 RX ADMIN — RIVAROXABAN 15 MILLIGRAM(S): KIT at 17:43

## 2019-06-30 RX ADMIN — Medication 25 MILLIGRAM(S): at 06:12

## 2019-06-30 RX ADMIN — LIDOCAINE 1 PATCH: 4 CREAM TOPICAL at 08:00

## 2019-06-30 RX ADMIN — OXYCODONE HYDROCHLORIDE 5 MILLIGRAM(S): 5 TABLET ORAL at 01:19

## 2019-06-30 RX ADMIN — Medication 5 MILLIGRAM(S): at 08:38

## 2019-06-30 RX ADMIN — CEFEPIME 1000 MILLIGRAM(S): 1 INJECTION, POWDER, FOR SOLUTION INTRAMUSCULAR; INTRAVENOUS at 06:52

## 2019-06-30 RX ADMIN — Medication 4 MILLIGRAM(S): at 21:38

## 2019-06-30 RX ADMIN — LIDOCAINE 1 PATCH: 4 CREAM TOPICAL at 12:36

## 2019-06-30 RX ADMIN — VALSARTAN 320 MILLIGRAM(S): 80 TABLET ORAL at 06:12

## 2019-06-30 RX ADMIN — CEFEPIME 1000 MILLIGRAM(S): 1 INJECTION, POWDER, FOR SOLUTION INTRAMUSCULAR; INTRAVENOUS at 17:42

## 2019-06-30 RX ADMIN — LIDOCAINE 1 PATCH: 4 CREAM TOPICAL at 17:48

## 2019-06-30 RX ADMIN — Medication 81 MILLIGRAM(S): at 12:36

## 2019-06-30 RX ADMIN — ENOXAPARIN SODIUM 80 MILLIGRAM(S): 100 INJECTION SUBCUTANEOUS at 06:52

## 2019-06-30 RX ADMIN — Medication 25 MILLIGRAM(S): at 17:43

## 2019-06-30 RX ADMIN — LIDOCAINE 1 PATCH: 4 CREAM TOPICAL at 07:00

## 2019-06-30 NOTE — PROGRESS NOTE ADULT - ASSESSMENT
83M w/ PMHx of HTN, prostate cancer, BPH, tubular adenoma, and diverticulosis who presented w/ recurrent falls admitted for sepsis secondary to PNA with hospital course complicated by PE during hospitalization     #Sepsis 2/2 PNA: resolved  - CT: RUL consolidation  -Continue IV cefepime  - Cristhian follow up appreciated    #B/L extensie PE-possibly acute on chr  -B/L DVT  - w/ mod-severe pulm HTN    -On lovenox 80 mg BID, change to xarelto  -Pulmonary follow up appreciated  -Hypercoagulable work up-can be done as an outpatient  -Dr DAMION Bowie consult appreciated  -AC follow up appreciated    #Elevated troponins  - likely due to PE  -Demand ischemia  - cardio recs appreciated, no cath at this time    #HTN  - continue toprol and diovan    #1.2cm incidental left renal lesion  - urology recs appreciated: f/u outpatient     #DVT PPx      PT re eval tomorrow for possible rehab placement

## 2019-06-30 NOTE — PROGRESS NOTE ADULT - ASSESSMENT
84 y/o male with PMHx of HTN, BPH, diverticulosis, prostate CA, tubular adenoma who presents to the ED via EMS after a recurrent  fall this afternoon.    Pts reports he fell at 6pm last night and again today at 1pm while he was trying to urinate in the bathroom.  He denies LOC and reports chills, extreme weakness  and fatigue for a few  days, also assoc  bifrontal mild headache, 1-2 loose stool diarrhea and decreased appetite/PO intake. Pt saw Dr. Nik Zimmerman at 10 am this morning ,  and was prescribed cefuroxime for  chest congestion and  a sinus infection.  ED, he received Sepsis antibiotics with 2gm Cefepime and Vanco 1gm,  and 2400 ml of LR, 650mg tylenol.  EMERGENCY consult was called this evening. I spoke with Dr Erika walls case was discussed,, I also spoke with RN staff and data obtained,   I asked for ICU eval / ABG / EKG and came to see him along with Dr Najera from ICU.  Hx obtained from pt and family as well as RN staff on 3 north at bedside.  Acute Hypoxemic respirator failure  Acute on chronic PE [place]  Moderate-severe pulm HTn due to above  Hemodynamically stable  Increased A-a gradient on ABG / adeq oxygenation now on 4-5 liters nc and declines O2 mask / repeat ABGH unchanged  ABG - ( 27 Jun 2019 09:04 )  pH, Arterial: 7.49  pH, Blood: x     /  pCO2: 31    /  pO2: 64    / HCO3: 24    / Base Excess: 1.3   /  SaO2: 93      pneumonia related sxs on admission with residual findings on ct scan  Elevated tropnin likely due to demand ischemia / no EKG changes  no clinical indication presently for thrombolysis , LV and RV function stable  T 12 compression fx  small bilateral pleural effusions R>L  Bilateral DVT on dopplers  CXR shows improvement in RUL infiltrate  Monitor resp status and Oxygen saturation carefully  ICU eval with Dr Najera appreciated and all findings with increased risk of morbidity and mortality with this dx explained to family  Thrombophilia workup necessary but can be done later as it will not   heme eval appropriate for workup and choice of AC in view of having prostate Ca and likely acute on chronic PE  Lovenox therapy for now (may change to Xarelto depending on copayment but Lovenox would be the medication of choice given history of cancer)  Plan for repeat ECHO on Monday  Contact information: Jing Mariee [phone] for updates  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

## 2019-06-30 NOTE — PROGRESS NOTE ADULT - ASSESSMENT
A 82 y/o male with PMHx of HTN, BPH, diverticulosis, prostate CA, tubular adenoma who presents for sepsis 2/2 PNA and found to have acute b/l PE and right posterior tibial DVT and left peroneal DVT. As per heme/onc evaluated pt for non-metastatic prostate cancer and recommends either DOACs or warfarin. Discussed with pt and family at bedside about transitioning from lovenox to DOAC. Disks risks/benefits of each anticoag DVT treatment, its lifestyle modification, costs. They felt reluctant to change to oral AC since was informed that lovenox is optimal for cancer pts. Discussed this with hospitalist and will leave pt on lovenox for now. Family would also like for pt to learn lovenox self inj and get copay idea before going forward. If pt cannot self inject or if copay expensive, then agree to switch to Xarelto.     6/30/19: Informed both pt and wife that copay for lovenox 1 mth supply is $363.98. When asked pt how he tolerated lovenox self injection with nurse yesterday evening and this morning, he reports that he had difficulty. Wife also unable to inject for patient. Then sent eRx for Xarelto 15 mg BID x 21 days copay $32.76 and thereafter Xarelto 20 mg daily 30 tabs copay $47/month. Pt and wife both agree to transition to Xarelto going forward    Plan:  d/t cost, stop lovenox. Will start Xarelto 15 mg PO BID x 21 days with food tonight at 6PM  then transition to Xarelto 20 mg PO daily on evening of 7/21/19.    monitor CBC/BMP    Will continue to follow. Please call [phone] for questions A 82 y/o male with PMHx of HTN, BPH, diverticulosis, prostate CA, tubular adenoma who presents for sepsis 2/2 PNA and found to have acute b/l PE and right posterior tibial DVT and left peroneal DVT. As per heme/onc evaluated pt for non-metastatic prostate cancer and recommends either DOACs or warfarin. Discussed with pt and family at bedside about transitioning from lovenox to DOAC. Disks risks/benefits of each anticoag DVT treatment, its lifestyle modification, costs. They felt reluctant to change to oral AC since was informed that lovenox is optimal for cancer pts. Discussed this with hospitalist and will leave pt on lovenox for now. Family would also like for pt to learn lovenox self inj and get copay idea before going forward. If pt cannot self inject or if copay expensive, then agree to switch to Xarelto.     6/30/19: Informed both pt and wife that copay for lovenox 1 mth supply is $363.98. When asked pt how he tolerated lovenox self injection with nurse yesterday evening and this morning, he reports that he had difficulty. Wife also unable to inject for patient. Then sent eRx for Xarelto 15 mg BID x 21 days copay $32.76 and thereafter Xarelto 20 mg daily 30 tabs copay $47/month. Pt and wife both agree to transition to Xarelto going forward. Education provided to pt on s/s bleeding and safety concerns. He verbalized understanding.     Plan:  d/t cost, stop lovenox. Will start Xarelto 15 mg PO BID x 21 days with food tonight at 6PM  then transition to Xarelto 20 mg PO daily on evening of 7/21/19.    monitor CBC/BMP    Will continue to follow. Please call [phone] for questions

## 2019-06-30 NOTE — PROGRESS NOTE ADULT - PROBLEM SELECTOR PLAN 3
Echo suggestive of moderate-severe pulmonary HTN; ? chronicity but PE likely  contributing; recommend repeat TTE in several months to reassess estimated pulmonary pressures.
Still on ABX and pulmonary and medicine following along with ID.
as per hospitalist , would recommend pulmonary evaluation

## 2019-06-30 NOTE — PROGRESS NOTE ADULT - PROBLEM SELECTOR PLAN 2
Poorly controlled; increase valsartan to 320mg daily.
Bp remains elevated -- I increased valsartan to 320mg daily yesterday; would consider adding amlodipine if BP remains high.
Controlled this AM.
Modestly elevated blood pressure; uptitrate valsartan if BP persistently elevated.
mild uncontrolled , continue current medication

## 2019-06-30 NOTE — PROGRESS NOTE ADULT - PROBLEM SELECTOR PLAN 1
Acute bilateral PE - clinically doing well (hemodynamically stable; tolerating anticoagulation).
Acute bilateral PE - clinically doing well (hemodynamically stable; tolerating anticoagulation).
Secondary to pulmonary embolus and started on Lovenox.  Echo shows preserved LV and RV FX.  Pulmonary following.  Elevated tropinin secondary to this. No signs of RV strain and therefor no Thrombolysis indicated.
possibly due to COPD and Pneumonia ,  normal LVEF , diastolic dysfunction ,  rule out pulmonary embolism  , patient will have CT angio
Acute bilateral PE - clinically doing well (hemodynamically stable; tolerating anticoagulation).

## 2019-06-30 NOTE — PROGRESS NOTE ADULT - PROBLEM SELECTOR PROBLEM 3
Pulmonary hypertension
CAP (community acquired pneumonia)
CAP (community acquired pneumonia)
Pulmonary hypertension
Pulmonary hypertension

## 2019-07-01 DIAGNOSIS — M17.12 UNILATERAL PRIMARY OSTEOARTHRITIS, LEFT KNEE: ICD-10-CM

## 2019-07-01 LAB
BASE EXCESS BLDA CALC-SCNC: 0.2 MMOL/L — SIGNIFICANT CHANGE UP (ref -2–2)
BLOOD GAS COMMENTS ARTERIAL: SIGNIFICANT CHANGE UP
GAS PNL BLDA: SIGNIFICANT CHANGE UP
HCO3 BLDA-SCNC: 22 MMOL/L — SIGNIFICANT CHANGE UP (ref 21–29)
PCO2 BLDA: 30 MMHG — LOW (ref 32–46)
PH BLDA: 7.48 — HIGH (ref 7.35–7.45)
PO2 BLDA: 74 MMHG — SIGNIFICANT CHANGE UP (ref 74–108)
SAO2 % BLDA: 95 % — SIGNIFICANT CHANGE UP (ref 92–96)

## 2019-07-01 PROCEDURE — 99232 SBSQ HOSP IP/OBS MODERATE 35: CPT

## 2019-07-01 PROCEDURE — 99233 SBSQ HOSP IP/OBS HIGH 50: CPT

## 2019-07-01 PROCEDURE — 73562 X-RAY EXAM OF KNEE 3: CPT | Mod: 26,LT

## 2019-07-01 RX ORDER — ENOXAPARIN SODIUM 100 MG/ML
80 INJECTION SUBCUTANEOUS
Qty: 60 | Refills: 0
Start: 2019-07-01 | End: 2019-07-30

## 2019-07-01 RX ORDER — RIVAROXABAN 15 MG-20MG
1 KIT ORAL
Qty: 42 | Refills: 0
Start: 2019-07-01 | End: 2019-07-21

## 2019-07-01 RX ADMIN — Medication 25 MILLIGRAM(S): at 05:32

## 2019-07-01 RX ADMIN — Medication 4 MILLIGRAM(S): at 21:26

## 2019-07-01 RX ADMIN — RIVAROXABAN 15 MILLIGRAM(S): KIT at 18:21

## 2019-07-01 RX ADMIN — LIDOCAINE 1 PATCH: 4 CREAM TOPICAL at 19:00

## 2019-07-01 RX ADMIN — LIDOCAINE 1 PATCH: 4 CREAM TOPICAL at 00:36

## 2019-07-01 RX ADMIN — Medication 25 MILLIGRAM(S): at 18:21

## 2019-07-01 RX ADMIN — CEFEPIME 1000 MILLIGRAM(S): 1 INJECTION, POWDER, FOR SOLUTION INTRAMUSCULAR; INTRAVENOUS at 05:33

## 2019-07-01 RX ADMIN — LIDOCAINE 1 PATCH: 4 CREAM TOPICAL at 13:28

## 2019-07-01 RX ADMIN — VALSARTAN 320 MILLIGRAM(S): 80 TABLET ORAL at 05:33

## 2019-07-01 RX ADMIN — Medication 81 MILLIGRAM(S): at 13:31

## 2019-07-01 RX ADMIN — RIVAROXABAN 15 MILLIGRAM(S): KIT at 08:13

## 2019-07-01 NOTE — CONSULT NOTE ADULT - REASON FOR ADMISSION
Fever  RUL Pna  Weakness  Fall  Abd Distension  Overflow Incontinence/Urinary Urgency

## 2019-07-01 NOTE — PROGRESS NOTE ADULT - ASSESSMENT
83M w/ PMHx of HTN, prostate cancer, BPH, tubular adenoma, and diverticulosis who presented w/ recurrent falls admitted for sepsis secondary to PNA with hospital course complicated by PE during hospitalization     #Sepsis 2/2 PNA: resolved  - CT: RUL consolidation  -last day of  IV cefepime  -Dr Morrow follow up appreciated    #B/L extensie PE-possibly acute on chr  -B/L DVT  - w/ mod-severe pulm HTN    -S/P lovenox 80 mg BID, changed to xarelto  -Pulmonary follow up appreciated  -Hypercoagulable work up-can be done as an outpatient  -Dr DAMION Bowie consult appreciated  -AC follow up appreciated    #Elevated troponins  - likely due to PE  -Demand ischemia  - cardio recs appreciated, no cath at this time    #HTN-better today  - continue toprol and diovan    #1.2cm incidental left renal lesion  - urology recs appreciated: f/u outpatient     #Disposition-PT eval  Possible to rehab    Transfer to floor

## 2019-07-01 NOTE — CONSULT NOTE ADULT - ASSESSMENT
A/P: 83y Male with chronic Left Knee Pain/Osteoarthritis and acute stiffening from underuse  FU XR  Gentle PT and ambulation  DVT PE tx  FU with Orthopedist as needed, Dr. Badillo  Orthopedically stable for DC  Dr. Badillo notified of above A/P: 83y Male with chronic Left Knee Pain/Osteoarthritis and acute stiffening from underuse    No acute surgical intervention  Pt with chronic R knee OA.   No effusion/redness/warmth to raise concern for septic arthritis  Gentle PT and ambulation  DVT PE tx  FU with Orthopedist as needed, Dr. Badillo  Orthopedically stable for DC  Dr. Badillo notified of above A/P: 83y Male with chronic Left Knee Pain/Osteoarthritis and acute stiffening from underuse    No acute surgical intervention  Pt with chronic knee OA.   No effusion/redness/warmth to raise concern for septic arthritis  Gentle PT and ambulation  DVT PE tx  FU with Orthopedist as needed, Dr. Badillo  Orthopedically stable for DC  Dr. Badillo notified of above

## 2019-07-01 NOTE — PROGRESS NOTE ADULT - ASSESSMENT
82 y/o male with PMHx of HTN, BPH, diverticulosis, prostate CA, tubular adenoma who presents to the ED via EMS after a recurrent  fall this afternoon.    Pts reports he fell at 6pm last night and again today at 1pm while he was trying to urinate in the bathroom.  He denies LOC and reports chills, extreme weakness  and fatigue for a few  days, also assoc  bifrontal mild headache, 1-2 loose stool diarrhea and decreased appetite/PO intake. Pt saw Dr. Nik Zimmerman at 10 am this morning ,  and was prescribed cefuroxime for  chest congestion and  a sinus infection.  ED, he received Sepsis antibiotics with 2gm Cefepime and Vanco 1gm,  and 2400 ml of LR, 650mg tylenol.  EMERGENCY consult was called this evening. I spoke with Dr Erika walls case was discussed,, I also spoke with RN staff and data obtained,   I asked for ICU eval / ABG / EKG and came to see him along with Dr Najera from ICU.  Hx obtained from pt and family as well as RN staff on 3 north at bedside.  Acute Hypoxemic respirator failure  Acute on chronic PE [place]  Moderate-severe pulm HTn due to above  Hemodynamically stable  Increased A-a gradient on ABG / adeq oxygenation now on 4-5 liters nc and declines O2 mask / repeat ABGH unchanged  ABG - ( 27 Jun 2019 09:04 )  pH, Arterial: 7.49  pH, Blood: x     /  pCO2: 31    /  pO2: 64    / HCO3: 24    / Base Excess: 1.3   /  SaO2: 93      pneumonia related sxs on admission with residual findings on ct scan  Elevated tropnin likely due to demand ischemia / no EKG changes  no clinical indication presently for thrombolysis , LV and RV function stable  T 12 compression fx  small bilateral pleural effusions R>L  Bilateral DVT on dopplers  CXR shows improvement in RUL infiltrate  Monitor resp status and Oxygen saturation carefully  ICU eval with Dr Najera appreciated and all findings with increased risk of morbidity and mortality with this dx explained to family  Thrombophilia workup necessary but can be done later as it will not   heme eval appropriate for workup and choice of AC in view of having prostate Ca and likely acute on chronic PE  Transitioned to Xarelto by Anticoagulation team due to cost issues  Plan for repeat ECHO today; follow it up  Ordered new ABG  Contact information: Jing Mariee [phone] for updates  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

## 2019-07-01 NOTE — CONSULT NOTE ADULT - PROBLEM SELECTOR PROBLEM 1
Primary osteoarthritis of left knee
Other acute pulmonary embolism with acute cor pulmonale
SOB (shortness of breath)

## 2019-07-01 NOTE — PROGRESS NOTE ADULT - ASSESSMENT
A 82 y/o male with PMHx of HTN, BPH, diverticulosis, prostate CA, tubular adenoma who presents for sepsis 2/2 PNA and found to have acute b/l PE and right posterior tibial DVT and left peroneal DVT. As per heme/onc evaluated pt for non-metastatic prostate cancer and recommends either DOACs or warfarin.   pt now on xarelto    6/30/19: Informed both pt and wife that copay for lovenox 1 mth supply is $363.98. When asked pt how he tolerated lovenox self injection with nurse yesterday evening and this morning, he reports that he had difficulty. Wife also unable to inject for patient. Then sent eRx for Xarelto 15 mg BID x 21 days copay $32.76 and thereafter Xarelto 20 mg daily 30 tabs copay $47/month. Pt and wife both agree to transition to Xarelto going forward. Education provided to pt on s/s bleeding and safety concerns. He verbalized understanding.     Plan:  cont Xarelto 15 mg PO BID x 21 days  then transition to Xarelto 20 mg PO daily on evening of 7/21/19.    monitor CBC/BMP    Will continue to follow.

## 2019-07-01 NOTE — CONSULT NOTE ADULT - SUBJECTIVE AND OBJECTIVE BOX
83y Male community ambulator presents c/o  Left knee stiffness. He has known LEft knee OA adn a hx of meniscus tear, and has received several injection in Dr. Lopez office including SYnvisc (last injection Feb 2019). He has been hospitalized here for the past week with a diagnosis of pna, DVT and PE. During his hospitalizaion and bedrest his left knee became stiffer than usualy. He was up today briefly with PT and ambulated, and already his knee is feeling better. ROM has improved. He denies any unusual or associated pain. No recent trauma. HE says he knows he is "bone on bone" and has been reluctant to have a TKA. At baseline he can walk about 2 blocks.  Denies numbness/tingling. Denies fever/chills. Denies pain/injury elsewhere. At this time he is not requesting any invasive interventions or injection. He would like to see if it improves on its own and follow up. He plans to be DC'd tomorrow. Orthopedics was requested to see pt.    HEALTH ISSUES - PROBLEM Dx:  Prostate cancer: Prostate cancer  DVT, lower extremity, proximal, acute, bilateral: DVT, lower extremity, proximal, acute, bilateral  Other acute pulmonary embolism with acute cor pulmonale: Other acute pulmonary embolism with acute cor pulmonale  Pulmonary embolism: Pulmonary embolism  Pulmonary hypertension: Pulmonary hypertension  Unresponsive episode: Unresponsive episode  CAP (community acquired pneumonia): CAP (community acquired pneumonia)  HTN (hypertension): HTN (hypertension)  SOB (shortness of breath): SOB (shortness of breath)        PAST MEDICAL & SURGICAL HISTORY:  Diverticulosis: sigmoid  Tubular adenoma  BPH (Benign Prostatic Hyperplasia)  History of prostate cancer: seed implant 01/2004  HTN (hypertension)  Sensorineural hearing loss: bilateral  Rhinitis  Deviated nasal septum  History of atypical nevus  S/P cataract surgery: left eye  Prostate ca: with seed implantation 1/2004  Mastoid disorder  History of tonsillectomy  History of rhinoplasty    MEDICATIONS  (STANDING):  aspirin enteric coated 81 milliGRAM(s) Oral daily  doxazosin 4 milliGRAM(s) Oral at bedtime  furosemide   Injectable 20 milliGRAM(s) IV Push once  lidocaine   Patch 1 Patch Transdermal daily  metoprolol succinate ER 25 milliGRAM(s) Oral every 12 hours  rivaroxaban 15 milliGRAM(s) Oral two times a day with meals  valsartan 320 milliGRAM(s) Oral daily    Allergies    adhesives (Rash)  No Known Drug Allergies      Vital Signs Last 24 Hrs  T(C): 36.8 (07-01-19 @ 14:37), Max: 37.1 (06-30-19 @ 21:08)  T(F): 98.3 (07-01-19 @ 14:37), Max: 98.8 (06-30-19 @ 21:08)  HR: 88 (07-01-19 @ 16:00) (68 - 93)  BP: 149/91 (07-01-19 @ 16:00) (135/81 - 172/93)  BP(mean): 109 (07-01-19 @ 16:00) (94 - 115)  RR: 20 (07-01-19 @ 16:00) (17 - 24)  SpO2: 95% (07-01-19 @ 16:00) (91% - 99%)    Imaging: XR pending, LEft knee    Physical Exam  Gen: NAD, sitting up in chair  Left LE: attention to  knee; skin intact, No erythema. No unusual warmth. No effusion. Nothing to suggest sepsis. AROM 10-95 deg limited by stiffness. Ext mech intact. Able to SLR, Neg log roll, Negative Heel strike, no ttp elsewhere, +EHL/FHL/TA/GS function, DP/PT pulses palpable, compartments soft. Calf soft, nontender. Varus/Valgus stress Negative.    RLE: FAROM, painless, baseline at hip, knee, ankle  UE: Bilat FAROM painless, baseline at shoulders elbows and wrists

## 2019-07-01 NOTE — PROGRESS NOTE ADULT - ASSESSMENT
82 y/o male with h/o HTN, BPH, diverticulosis, prostate CA, tubular adenoma was admitted on 6/22 for increased weakness and recurrent  falls. Pts reports he fell at 6pm the night PTA and again the next day while he was trying to urinate in the bathroom.  He denies LOC, but reports chills, extreme weakness and fatigue for a few  days, also assoc bifrontal mild headache, 1-2 loose stools and decreased appetite/PO intake. Pt saw Dr. Nik Zimmerman earlier on the day of admission and was prescribed cefuroxime for chest congestion and  a sinus infection. In ER he was noted febrile with a rectal temp 103F. He received cefepime and Vanco 1gm, then ceftriaxone and azithromycin.    1. RUL pneumonia resolving. Syncopal episode. B/L PE. CRF stage 3  -dyspnea is improved  -leukocytosis resolved  -renal function improved  - BC x 2 reviewed  -on cefepime 1 gm IV 12h # 9  -tolerating abx well so far; no side effects noted  -complete abx therapy today  -respiratory care  -hydration  -monitor temps  -f/u CBC  -supportive care  2. Other issues: DVT  -care per medicine

## 2019-07-01 NOTE — PHARMACOTHERAPY INTERVENTION NOTE - COMMENTS
Performed patient education on medications including xarelto, and the current medications that patient was receiving during the admission. Addressed concerns with the patient

## 2019-07-01 NOTE — CONSULT NOTE ADULT - CONSULT REQUESTED DATE/TIME
01-Jul-2019 16:49
23-Jun-2019 06:33
26-Jun-2019 20:55
28-Jun-2019 13:40
24-Jun-2019 14:42
29-Jun-2019 15:42
25-Jun-2019 15:07

## 2019-07-02 ENCOUNTER — TRANSCRIPTION ENCOUNTER (OUTPATIENT)
Age: 84
End: 2019-07-02

## 2019-07-02 VITALS
RESPIRATION RATE: 21 BRPM | HEART RATE: 90 BPM | DIASTOLIC BLOOD PRESSURE: 89 MMHG | OXYGEN SATURATION: 97 % | SYSTOLIC BLOOD PRESSURE: 156 MMHG

## 2019-07-02 LAB
ANION GAP SERPL CALC-SCNC: 9 MMOL/L — SIGNIFICANT CHANGE UP (ref 5–17)
BUN SERPL-MCNC: 21 MG/DL — SIGNIFICANT CHANGE UP (ref 7–23)
CALCIUM SERPL-MCNC: 9 MG/DL — SIGNIFICANT CHANGE UP (ref 8.5–10.1)
CHLORIDE SERPL-SCNC: 111 MMOL/L — HIGH (ref 96–108)
CO2 SERPL-SCNC: 24 MMOL/L — SIGNIFICANT CHANGE UP (ref 22–31)
CREAT SERPL-MCNC: 1.05 MG/DL — SIGNIFICANT CHANGE UP (ref 0.5–1.3)
GLUCOSE SERPL-MCNC: 104 MG/DL — HIGH (ref 70–99)
HCT VFR BLD CALC: 39.5 % — SIGNIFICANT CHANGE UP (ref 39–50)
HGB BLD-MCNC: 12.7 G/DL — LOW (ref 13–17)
MCHC RBC-ENTMCNC: 29.3 PG — SIGNIFICANT CHANGE UP (ref 27–34)
MCHC RBC-ENTMCNC: 32.2 GM/DL — SIGNIFICANT CHANGE UP (ref 32–36)
MCV RBC AUTO: 91.2 FL — SIGNIFICANT CHANGE UP (ref 80–100)
PLATELET # BLD AUTO: 333 K/UL — SIGNIFICANT CHANGE UP (ref 150–400)
POTASSIUM SERPL-MCNC: 3.7 MMOL/L — SIGNIFICANT CHANGE UP (ref 3.5–5.3)
POTASSIUM SERPL-SCNC: 3.7 MMOL/L — SIGNIFICANT CHANGE UP (ref 3.5–5.3)
RBC # BLD: 4.33 M/UL — SIGNIFICANT CHANGE UP (ref 4.2–5.8)
RBC # FLD: 14.3 % — SIGNIFICANT CHANGE UP (ref 10.3–14.5)
SODIUM SERPL-SCNC: 144 MMOL/L — SIGNIFICANT CHANGE UP (ref 135–145)
WBC # BLD: 11.61 K/UL — HIGH (ref 3.8–10.5)
WBC # FLD AUTO: 11.61 K/UL — HIGH (ref 3.8–10.5)

## 2019-07-02 PROCEDURE — 99233 SBSQ HOSP IP/OBS HIGH 50: CPT

## 2019-07-02 RX ORDER — METOPROLOL TARTRATE 50 MG
1 TABLET ORAL
Qty: 0 | Refills: 0 | DISCHARGE
Start: 2019-07-02

## 2019-07-02 RX ORDER — ASCORBIC ACID 60 MG
0 TABLET,CHEWABLE ORAL
Qty: 0 | Refills: 0 | DISCHARGE

## 2019-07-02 RX ORDER — RIVAROXABAN 15 MG-20MG
1 KIT ORAL
Qty: 38 | Refills: 0
Start: 2019-07-02 | End: 2019-07-20

## 2019-07-02 RX ORDER — VALSARTAN 80 MG/1
1 TABLET ORAL
Qty: 0 | Refills: 0 | DISCHARGE
Start: 2019-07-02

## 2019-07-02 RX ORDER — LIDOCAINE HCL 20 MG/ML
10 VIAL (ML) INJECTION ONCE
Refills: 0 | Status: DISCONTINUED | OUTPATIENT
Start: 2019-07-02 | End: 2019-07-02

## 2019-07-02 RX ORDER — OMEGA-3 ACID ETHYL ESTERS 1 G
0 CAPSULE ORAL
Qty: 0 | Refills: 0 | DISCHARGE

## 2019-07-02 RX ORDER — ACETAMINOPHEN 500 MG
2 TABLET ORAL
Qty: 0 | Refills: 0 | DISCHARGE
Start: 2019-07-02

## 2019-07-02 RX ORDER — CEFUROXIME AXETIL 250 MG
0 TABLET ORAL
Qty: 20 | Refills: 0 | DISCHARGE

## 2019-07-02 RX ORDER — PREGABALIN 225 MG/1
0 CAPSULE ORAL
Qty: 0 | Refills: 0 | DISCHARGE

## 2019-07-02 RX ORDER — SOD SULF/SODIUM/NAHCO3/KCL/PEG
0 SOLUTION, RECONSTITUTED, ORAL ORAL
Qty: 4000 | Refills: 0 | DISCHARGE

## 2019-07-02 RX ORDER — CHOLECALCIFEROL (VITAMIN D3) 125 MCG
1 CAPSULE ORAL
Qty: 0 | Refills: 0 | DISCHARGE

## 2019-07-02 RX ORDER — SIMETHICONE 80 MG/1
1 TABLET, CHEWABLE ORAL
Qty: 0 | Refills: 0 | DISCHARGE
Start: 2019-07-02

## 2019-07-02 RX ORDER — METOPROLOL TARTRATE 50 MG
1 TABLET ORAL
Qty: 0 | Refills: 0 | DISCHARGE

## 2019-07-02 RX ADMIN — Medication 650 MILLIGRAM(S): at 01:23

## 2019-07-02 RX ADMIN — LIDOCAINE 1 PATCH: 4 CREAM TOPICAL at 11:24

## 2019-07-02 RX ADMIN — RIVAROXABAN 15 MILLIGRAM(S): KIT at 08:43

## 2019-07-02 RX ADMIN — LIDOCAINE 1 PATCH: 4 CREAM TOPICAL at 03:30

## 2019-07-02 RX ADMIN — VALSARTAN 320 MILLIGRAM(S): 80 TABLET ORAL at 05:58

## 2019-07-02 RX ADMIN — Medication 81 MILLIGRAM(S): at 11:24

## 2019-07-02 RX ADMIN — Medication 25 MILLIGRAM(S): at 05:59

## 2019-07-02 NOTE — PROGRESS NOTE ADULT - REASON FOR ADMISSION
Fever  RUL Pna  Weakness  Fall  Abd Distension  Overflow Incontinence/Urinary Urgency

## 2019-07-02 NOTE — PROGRESS NOTE ADULT - PROVIDER SPECIALTY LIST ADULT
Anticoag Management
Cardiology
Critical Care
Family Medicine
Hospitalist
Infectious Disease
Pulmonology
Family Medicine
Hospitalist
Hospitalist
Infectious Disease
Pulmonology

## 2019-07-02 NOTE — PROGRESS NOTE ADULT - ASSESSMENT
82 y/o male with PMHx of HTN, BPH, diverticulosis, prostate CA, tubular adenoma who presents to the ED via EMS after a recurrent  fall this afternoon.    Pts reports he fell at 6pm last night and again today at 1pm while he was trying to urinate in the bathroom.  He denies LOC and reports chills, extreme weakness  and fatigue for a few  days, also assoc  bifrontal mild headache, 1-2 loose stool diarrhea and decreased appetite/PO intake. Pt saw Dr. Nik Zimmerman at 10 am this morning ,  and was prescribed cefuroxime for  chest congestion and  a sinus infection.  ED, he received Sepsis antibiotics with 2gm Cefepime and Vanco 1gm,  and 2400 ml of LR, 650mg tylenol.  EMERGENCY consult was called this evening. I spoke with Dr Erika walls case was discussed,, I also spoke with RN staff and data obtained,   I asked for ICU eval / ABG / EKG and came to see him along with Dr Najera from ICU.  Hx obtained from pt and family as well as RN staff on 3 north at bedside.  Acute Hypoxemic respirator failure  Acute on chronic PE [place]  Moderate-severe pulm HTn due to above  Hemodynamically stable  Increased A-a gradient on ABG / adeq oxygenation now on 4-5 liters nc and declines O2 mask / repeat ABGH unchanged  ABG - ( 27 Jun 2019 09:04 )  pH, Arterial: 7.49  pH, Blood: x     /  pCO2: 31    /  pO2: 64    / HCO3: 24    / Base Excess: 1.3   /  SaO2: 93      pneumonia related sxs on admission with residual findings on ct scan  Elevated tropnin likely due to demand ischemia / no EKG changes  no clinical indication presently for thrombolysis , LV and RV function stable  T 12 compression fx  small bilateral pleural effusions R>L  Bilateral DVT on dopplers  CXR shows improvement in RUL infiltrate  Monitor resp status and Oxygen saturation carefully  ICU eval with Dr Najera appreciated and all findings with increased risk of morbidity and mortality with this dx explained to family  Thrombophilia workup necessary but can be done later as it will not   heme eval appropriate for workup and choice of AC in view of having prostate Ca and likely acute on chronic PE  Transitioned to Xarelto by Anticoagulation team due to cost issues  Follow up echocardiogram   Reviewed ABG 7.48/30/74  Contact information: Jing Mariee [phone] for updates  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    Pts reports he fell at 6pm last night and again today at 1pm while he was trying to urinate in the bathroom.  He denies LOC and reports chills, extreme weakness  and fatigue for a few  days, also assoc  bifrontal mild headache, 1-2 loose stool diarrhea and decreased appetite/PO intake. Pt saw Dr. Nik Zimmerman at 10 am this morning ,  and was prescribed cefuroxime for  chest congestion and  a sinus infection.  ED, he received Sepsis antibiotics with 2gm Cefepime and Vanco 1gm,  and 2400 ml of LR, 650mg tylenol.  EMERGENCY consult was called this evening. I spoke with Dr Erika walls case was discussed,, I also spoke with RN staff and data obtained,   I asked for ICU eval / ABG / EKG and came to see him along with Dr Najera from ICU.  Hx obtained from pt and family as well as RN staff on 3 north at bedside.  Acute Hypoxemic respirator failure  Acute on chronic PE [place]  Moderate-severe pulm HTn due to above  Hemodynamically stable  Increased A-a gradient on ABG / adeq oxygenation now on 4-5 liters nc and declines O2 mask / repeat ABGH unchanged  ABG - ( 27 Jun 2019 09:04 )  pH, Arterial: 7.49  pH, Blood: x     /  pCO2: 31    /  pO2: 64    / HCO3: 24    / Base Excess: 1.3   /  SaO2: 93      pneumonia related sxs on admission with residual findings on ct scan  Elevated tropnin likely due to demand ischemia / no EKG changes  no clinical indication presently for thrombolysis , LV and RV function stable  T 12 compression fx  small bilateral pleural effusions R>L  Bilateral DVT on dopplers  CXR shows improvement in RUL infiltrate  Monitor resp status and Oxygen saturation carefully  ICU eval with Dr Najera appreciated and all findings with increased risk of morbidity and mortality with this dx explained to family  Thrombophilia workup necessary but can be done later as it will not   heme eval appropriate for workup and choice of AC in view of having prostate Ca and likely acute on chronic PE  Transitioned to Xarelto by Anticoagulation team due to cost issues  Follow up echocardiogram   Reviewed ABG 7.48/30/74  Contact information: Jing Mariee [phone] (discussed plan with her today, 7/2)  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

## 2019-07-02 NOTE — DISCHARGE NOTE PROVIDER - NSDCCPCAREPLAN_GEN_ALL_CORE_FT
PRINCIPAL DISCHARGE DIAGNOSIS  Diagnosis: CAP (community acquired pneumonia)  Assessment and Plan of Treatment: Completed IV cefepime. Follow up with Dr Chapin, Dr DAMION Bowie      SECONDARY DISCHARGE DIAGNOSES  Diagnosis: Falls, initial encounter  Assessment and Plan of Treatment:     Diagnosis: Injury of head, initial encounter  Assessment and Plan of Treatment:

## 2019-07-02 NOTE — PROVIDER CONTACT NOTE (OTHER) - REASON
Consult
Consult- Pulmonary
Dr Sheehan
Dr Tone Paulino (PCP)
PCP notified
Pt family requests to be reassigned a different hospitalist
Rehab
consult urology dr schulz
increased SOB and elevated BP
pt bp remains high
pt has temp
temp remains elevated
pt bp high

## 2019-07-02 NOTE — PROGRESS NOTE ADULT - ASSESSMENT
A 82 y/o male with PMHx of HTN, BPH, diverticulosis, prostate CA, tubular adenoma who presents for sepsis 2/2 PNA and found to have acute b/l PE and right posterior tibial DVT and left peroneal DVT. As per heme/onc evaluated pt for non-metastatic prostate cancer and recommends either DOACs or warfarin.   pt now on xarelto       Plan: poss Cteph:  will be owrked up by Pulmonary as out pt  cont Xarelto 15 mg PO BID x 21 days 6/30------>7/21  then transition to Xarelto 20 mg PO daily on evening of 7/22/19.    monitor CBC/BMP    Will continue to follow.   dispo: rehab

## 2019-07-02 NOTE — DISCHARGE NOTE PROVIDER - PROVIDER TOKENS
PROVIDER:[TOKEN:[[id number]]],PROVIDER:[TOKEN:[[id number]]],PROVIDER:[TOKEN:[[id number]]]

## 2019-07-02 NOTE — PROGRESS NOTE ADULT - SUBJECTIVE AND OBJECTIVE BOX
Date of service: 19 @ 12:03    Events noted: had another near syncopal episodes while with PT.  He went for a CTA chest and was found to have B/L PE.  SOB is improved today  Weak looking  Has dry cough    ROS: no fever or chills; denies dizziness, no HA, no CP no abdominal pain, no diarrhea or constipation; no dysuria, no legs pain, no rashes    MEDICATIONS  (STANDING):  aspirin enteric coated 81 milliGRAM(s) Oral daily  azithromycin  IVPB 500 milliGRAM(s) IV Intermittent every 24 hours  azithromycin  IVPB      cefepime  Injectable.      cefepime  Injectable. 1000 milliGRAM(s) IV Push every 12 hours  doxazosin 4 milliGRAM(s) Oral at bedtime  enoxaparin Injectable 80 milliGRAM(s) SubCutaneous every 12 hours  furosemide   Injectable 20 milliGRAM(s) IV Push once  metoprolol succinate ER 25 milliGRAM(s) Oral daily  sodium chloride 0.45%. 1000 milliLiter(s) (75 mL/Hr) IV Continuous <Continuous>  sodium chloride 0.9%. 1000 milliLiter(s) (50 mL/Hr) IV Continuous <Continuous>  valsartan 160 milliGRAM(s) Oral daily      Vital Signs Last 24 Hrs  T(C): 36.8 (2019 09:38), Max: 36.8 (2019 21:28)  T(F): 98.2 (2019 09:38), Max: 98.2 (2019 21:28)  HR: 80 (2019 09:01) (53 - 108)  BP: 149/90 (2019 09:01) (115/75 - 167/89)  BP(mean): 107 (2019 09:01) (87 - 113)  RR: 22 (2019 09:01) (17 - 31)  SpO2: 93% (2019 09:01) (89% - 96%)    Physical Exam:      Constitutional: frail looking  HEENT: NC/AT, EOMI, PERRLA, conjunctivae clear  Neck: supple; thyroid not palpable  Back: no tenderness  Respiratory: respiratory effort normal; few crackles at bases  Cardiovascular: S1S2 regular, no murmurs  Abdomen: soft, not tender, not distended, positive BS  Genitourinary: no suprapubic tenderness  Lymphatic: no LN palpable  Musculoskeletal: no muscle tenderness, no joint swelling or tenderness  Extremities: no pedal edema  Neurological/ Psychiatric: AxOx3, moving all extremities  Skin: no rashes; no palpable lesions    Labs: reviewed                        12.3   13.14 )-----------( 210      ( 2019 06:11 )             36.1     06-    145  |  112<H>  |  25<H>  ----------------------------<  101<H>  3.7   |  24  |  1.09    Ca    8.8      2019 06:11  Phos  4.2       Mg     2.3         TPro  6.2  /  Alb  2.3<L>  /  TBili  0.4  /  DBili  x   /  AST  71<H>  /  ALT  67  /  AlkPhos  52      144  |  110<H>  |  18  ----------------------------<  101<H>  3.5   |  27  |  1.00    Ca    8.9      2019 08:14  Mg     2.3                             13.8   8.13  )-----------( 177      ( 2019 08:34 )             41.3     06-24    141  |  107  |  20  ----------------------------<  105<H>  3.5   |  25  |  1.18    Ca    8.9      2019 08:34    TPro  7.3  /  Alb  2.7<L>  /  TBili  0.6  /  DBili  x   /  AST  47<H>  /  ALT  24  /  AlkPhos  52  0624                        15.5   12.76 )-----------( 207      ( 2019 14:45 )             45.6     06    138  |  103  |  21  ----------------------------<  123<H>  3.1<L>   |  26  |  1.50<H>    Ca    9.3      2019 14:45    TPro  8.0  /  Alb  3.5  /  TBili  0.8  /  DBili  x   /  AST  18  /  ALT  16  /  AlkPhos  68  06-22     LIVER FUNCTIONS - ( 2019 14:45 )  Alb: 3.5 g/dL / Pro: 8.0 gm/dL / ALK PHOS: 68 U/L / ALT: 16 U/L / AST: 18 U/L / GGT: x           Urinalysis Basic - ( 2019 14:45 )    Color: Yellow / Appearance: Clear / S.015 / pH: x  Gluc: x / Ketone: Small  / Bili: Negative / Urobili: 1 mg/dL   Blood: x / Protein: 30 mg/dL / Nitrite: Negative   Leuk Esterase: Negative / RBC: 3-5 /HPF / WBC 3-5   Sq Epi: x / Non Sq Epi: Few / Bacteria: Few      Culture - Blood (collected 2019 14:55)  Source: .Blood Blood-Peripheral  Preliminary Report (2019 20:01):    No growth to date.    Culture - Urine (collected 2019 14:45)  Source: .Urine Clean Catch (Midstream)  Final Report (2019 12:17):    No growth    Culture - Blood (collected 2019 14:45)  Source: .Blood Blood-Peripheral  Preliminary Report (2019 20:01):    No growth to date.    Radiology: all available radiological tests reviewed    < from: CT Chest w/ IV Cont (19 @ 16:36) >  No acute traumatic injury.   Age-indeterminate mild superior endplate compression fracture at T12.  Posterior right upper lobe consolidation, likely pneumonia. Follow-up to   complete resolution.  1.2 cm indeterminate left renal lesion. Follow-up nonemergent MRI abdomen   with and without contrast.    < end of copied text >    < from: CT Angio Chest PE Protocol w/ IV Cont (19 @ 12:42) >  Extensive bilateral pulmonary embolism.  This finding was discussed with  on 2019, shortly   after the examination was performed.  Enlarged main pulmonary artery and straightening of the interventricular   septum suggestive of right heart strain.  Right upper lobe pulmonary infarct. Probable small left lower lobe   pulmonary infarct.  Bilateral indeterminate renal lesions. Recommend MRI to further evaluate.    < end of copied text >      Advanced directives addressed: full resuscitation
HPI: Pt is an 84 y/o male with PMHx of HTN, BPH, diverticulosis, prostate CA, tubular adenoma who presents to the ED via EMS after a recurrent fall this afternoon. Pts reports he fell at 6pm last night and again today at 1pm while he was trying to urinate in the bathroom.  He denies LOC and reports chills, extreme weakness  and fatigue for a few  days, also assoc  bifrontal mild headache, 1-2 loose stool diarrhea and decreased appetite/PO intake. Pt saw Dr. Nik Zimmerman at 10 am on day of presentation, and was prescribed cefuroxime for chest congestion and a sinus infection (06.22.19)    SUBJECTIVE: Pt reports difficulty breathing. He had an episode of high blood pressure associated with mild confusion and increased shortness of breath that resolved once his blood pressure returned to 160s. Wife and daughter, Agnieszka, at bedside. Discussed w/ family my discussion with radiology concerning admission CT chest w/ IV contrast vs CTA per PE protocol. Discussed DVT findings and possible causes of DVT. Per wife, pt's father had waldenstrom's syndrome.    REVIEW OF SYSTEMS:  CONSTITUTIONAL: No fever, No weakness, chills  EYES/ENT: No visual changes;  No vertigo or throat pain   NECK: No pain or stiffness  RESPIRATORY: +SOB, No cough, no wheezing, hemoptysis  CARDIOVASCULAR: No chest pain or palpitations  GASTROINTESTINAL:, No abdominal or epigastric pain. No nausea, vomiting, or hematemesis; No constipation. No melena or hematochezia.  GENITOURINARY: No dysuria, frequency or hematuria  NEUROLOGICAL: No numbness or weakness  SKIN: No itching, burning, rashes, or lesions   All other review of systems is negative unless indicated above    Vital Signs Last 24 Hrs  T(C): 36.8 (27 Jun 2019 14:30), Max: 36.8 (26 Jun 2019 21:28)  T(F): 98.2 (27 Jun 2019 14:30), Max: 98.2 (26 Jun 2019 21:28)  HR: 92 (27 Jun 2019 17:01) (53 - 105)  BP: 161/98 (27 Jun 2019 17:01) (115/75 - 173/98)  BP(mean): 115 (27 Jun 2019 17:01) (87 - 119)  RR: 24 (27 Jun 2019 17:01) (17 - 34)  SpO2: 95% (27 Jun 2019 17:01) (90% - 97%)    I&O's Summary    26 Jun 2019 07:01  -  27 Jun 2019 07:00  --------------------------------------------------------  IN: 0 mL / OUT: 200 mL / NET: -200 mL    27 Jun 2019 07:01  -  27 Jun 2019 17:56  --------------------------------------------------------  IN: 0 mL / OUT: 250 mL / NET: -250 mL        CAPILLARY BLOOD GLUCOSE          PHYSICAL EXAM:  Constitutional: NAD, awake and alert, well-developed  HEENT: EOMI, Normal Hearing, MMM  Neck: Soft and supple, No LAD, questionable JVD  Respiratory: mild increased respiratory effort, crackles in RUL, + bibasilar crackles  Cardiovascular: S1 and S2, regular rate and rhythm, no Murmurs, gallops or rubs  Gastrointestinal: Bowel Sounds present, soft, nontender, nondistended, no guarding, no rebound  Extremities: No peripheral edema  Vascular: 2+ peripheral pulses  Neurological: A/O x 3, no focal deficits  Musculoskeletal: 5/5 strength b/l upper and lower extremities  Skin: No rashes    MEDICATIONS:  MEDICATIONS  (STANDING):  aspirin enteric coated 81 milliGRAM(s) Oral daily  cefepime  Injectable.      cefepime  Injectable. 1000 milliGRAM(s) IV Push every 12 hours  doxazosin 4 milliGRAM(s) Oral at bedtime  enoxaparin Injectable 80 milliGRAM(s) SubCutaneous every 12 hours  furosemide   Injectable 20 milliGRAM(s) IV Push once  metoprolol succinate ER 25 milliGRAM(s) Oral daily  valsartan 160 milliGRAM(s) Oral daily      LABS: All Labs Reviewed:                        12.3   13.14 )-----------( 210      ( 27 Jun 2019 06:11 )             36.1     06-27    145  |  112<H>  |  25<H>  ----------------------------<  101<H>  3.7   |  24  |  1.09        Ca    8.8      27 Jun 2019 06:11  Phos  4.2     06-27  Mg     2.3     06-26    TPro  6.2  /  Alb  2.3<L>  /  TBili  0.4  /  DBili  x   /  AST  71<H>  /  ALT  67  /  AlkPhos  52  06-27      CARDIAC MARKERS ( 27 Jun 2019 13:41 )  0.101 ng/mL / x     / x     / x     / x      CARDIAC MARKERS ( 27 Jun 2019 06:11 )  0.155 ng/mL / x     / x     / x     / x      CARDIAC MARKERS ( 26 Jun 2019 22:16 )  0.317 ng/mL / x     / x     / x     / x      CARDIAC MARKERS ( 25 Jun 2019 19:32 )  0.069 ng/mL / x     / x     / x     / x        Blood Gas Profile - Arterial in AM (06.27.19 @ 09:04)    pH, Arterial: 7.49    pCO2, Arterial: 31 mmHg    pO2, Arterial: 64 mmHg    HCO3, Arterial: 24 mmoL/L    Base Excess, Arterial: 1.3 mmol/L    Oxygen Saturation, Arterial: 93 %    Ova and Parasites (06.23.19 @ 20:45)    Culture Results:   Moderate Blastocystis hominis Cysts observed  performed by trichrome stain  (routine O+P not evaluated for Microsporidia,  Cryptosporidia, Cyclospora, or Isospora.)    Culture - Blood (06.22.19 @ 14:55)    Specimen Source: .Blood Blood-Peripheral    Culture Results:   No growth to date.    < from: Xray Chest 1 View- PORTABLE-Routine (06.27.19 @ 06:38) >  INTERPRETATION:  AP erect chest on June 27, 2019 at 6:23 AM. Patient is   being followed for right upper lobe pneumonia.    Heart is magnified by technique.    Right upper lobe infiltrate seen on June 26 has almost totally cleared on   present study.    IMPRESSION: Near-total resolution of right upper lobe infiltrate.
Patient is a 83y old  Male who presents with a chief complaint of Fever      HPI:  Pt is an 82 y/o male with PMHx of HTN, BPH, diverticulosis, prostate CA, tubular adenoma who presents to the ED via EMS after a recurrent  fall this afternoon.    Pts reports he fell at 6pm last night and again today at 1pm while he was trying to urinate in the bathroom.  He denies LOC and reports chills, extreme weakness  and fatigue for a few  days, also assoc  bifrontal mild headache, 1-2 loose stool diarrhea and decreased appetite/PO intake. Pt saw Dr. Nik Zimmerman at 10 am this morning ,  and was prescribed cefuroxime for  chest congestion and  a sinus infection. In the ED, pt was noted to be febrile with a rectal temp 103F.  He  denied neck pain, back pain, bilateral lower extremities pain. He reports 1 week of a non-productive cough.  No SOB or chest pain.  No hx of renal problems.  On 81mg every other day.    In the ED, he received Sepsis antibiotics with 2gm Cefepime and Vanco 1gm,  and 2400 ml of LR, 650mg tylenol.  EMERGENCY consult was called this evening. I spoke with dr James and shira for ICU eval / ABG / EKG and came to see him along with Dr Najera from ICU.  Hx obtained from pt and family as well as RN staff on 3 [address] at bedside.  In summary pt gibran admitted with DX RUL pneumonia with inital ct chest done with IV contrast but it was not PE protocol followed by episodes of dizziness and worsened hypozxemia and earlier today with pt collapse.  CTA with PE protocol done confirmed bilateral extensive PE and Pulm infarct pattern seen RUL, LLL  pt is tachypneic but no pleuritic pain / no hemoptysis and remains on o2 nc.  now will be moved to ICU step down unit.  lovenox started earlier today  ECHO data noted and discussed  Wife and DTR are at bedside Dr Jing Rouse [phone]  no hx DVT / PE  used to be very active    6/27  seen and examined today in stepdown unit  no hemoptysis  no c/o cp  overall feels better today, not tachycardic and less tachypneic today  family updated last night in details    6/28  pt is feeling better today  episode of confusion yesterday now resolved  he is awake and alert  BP remains elevated and his meds are getting adjusted  also remains in ICU step down unit  no cp  no pleuritic discomfort  all recent data discussed with pt's DTR yesterday    6/29  No acute events occurred overnight  BP still 174/102  Having mild dyspnea on exertion and at rest    6/30  No acute pulmonary events occurred overnight  Evaluated by Anticoagulation service     7/1  Patient to undergo TTE  Now will be d/c on Xarelto over Lovenox due to cost issues     7/2  No acute pulmonary events occurred overnight  BP still  labile     MEDICATIONS  (STANDING):  aspirin enteric coated 81 milliGRAM(s) Oral daily  doxazosin 4 milliGRAM(s) Oral at bedtime  furosemide   Injectable 20 milliGRAM(s) IV Push once  lidocaine   Patch 1 Patch Transdermal daily  metoprolol succinate ER 25 milliGRAM(s) Oral every 12 hours  rivaroxaban 15 milliGRAM(s) Oral two times a day with meals  valsartan 320 milliGRAM(s) Oral daily    MEDICATIONS  (PRN):  acetaminophen   Tablet .. 650 milliGRAM(s) Oral every 6 hours PRN Temp greater or equal to 38C (100.4F)  acetaminophen   Tablet .. 650 milliGRAM(s) Oral every 6 hours PRN Moderate Pain (4 - 6)  ALBUTerol/ipratropium for Nebulization 3 milliLiter(s) Nebulizer every 6 hours PRN Wheezing  simethicone 80 milliGRAM(s) Chew three times a day PRN Gas          PHYSICAL EXAM  General Appearance: cooperative, no resp distress, able to speak in short sentences  HEENT: PERRL, conjunctiva clear, EOM's intact, non injected pharynx, no exudate, TM   normal  Neck: Supple, , no adenopathy, thyroid: not enlarged, no carotid bruit or JVD  Back: Symmetric, no  tenderness,no soft tissue tenderness  Lungs: CTA, no wheezing, few lower lobe rhonchi now resolved  Heart: Regular rate and rhythm, S1, S2 normal, no murmur, rub or gallop  Abdomen: Soft, non-tender, bowel sounds active , no hepatosplenomegaly  Extremities: no cyanosis or edema, no joint swelling, No calf tenderness  Skin: Skin color, texture normal, no rashes   Neurologic: Alert and oriented X3 , cranial nerves intact, sensory and motor normal,    ECG:    repeat EKG done now without acute ischemic changes    < from: 12 Lead ECG (06.26.19 @ 09:48) >  Ventricular Rate 105 BPM    Atrial Rate 105 BPM    P-R Interval 160 ms    QRS Duration 92 ms    Q-T Interval 358 ms    QTC Calculation(Bezet) 473 ms    P Axis 25 degrees    R Axis -20 degrees    T Axis 27 degrees    Diagnosis Line Sinus tachycardia  Otherwise normal ECG  When compared with ECG of 26-JUN-2019 09:48,  No significant change was found    < end of copied text >      LABS:                        12.3   13.14 )-----------( 210      ( 27 Jun 2019 06:11 )             36.1   06-27    145  |  112<H>  |  25<H>  ----------------------------<  101<H>  3.7   |  24  |  1.09    Ca    8.8      27 Jun 2019 06:11  Phos  4.2     06-27  Mg     2.3     06-26    TPro  6.2  /  Alb  2.3<L>  /  TBili  0.4  /  DBili  x   /  AST  71<H>  /  ALT  67  /  AlkPhos  52  06-27 06-26    144  |  110<H>  |  18  ----------------------------<  101<H>  3.5   |  27  |  1.00    Ca    8.9      26 Jun 2019 08:14  Mg     2.3     06-26      CARDIAC MARKERS ( 25 Jun 2019 19:32 )  0.069 ng/mL / x     / x     / x     / x      CARDIAC MARKERS ( 25 Jun 2019 16:51 )  0.088 ng/mL / x     / x     / x     / x        06-26    144  |  110<H>  |  18  ----------------------------<  101<H>  3.5   |  27  |  1.00    Ca    8.9      26 Jun 2019 08:14  Mg     2.3     06-26        Pro BNP  1555 06-25 @ 16:51  D Dimer  -- 06-25 @ 16:51    Troponin I, Serum Repeat 3 hours x 2 occurrences (06.25.19 @ 19:32)    Troponin I, Serum: 0.069: High Sensitivity Troponin and new reference  range effective 7/6/2016 ng/mL        ABG - ( 26 Jun 2019 19:43 )  pH, Arterial: 7.50  pH, Blood: x     /  pCO2: 29    /  pO2: 68    / HCO3: 22    / Base Excess: .3    /  SaO2: 93        ECHO noted      < from: Transthoracic Echocardiogram (06.26.19 @ 10:54) >   Impression     Summary     Mild concentric left ventricular hypertrophy is present.   Left ventricular wallmotion appears hyperdynamic.   Estimated left ventricular ejection fraction is 70 %.   The IVC appears minimally dilated and is collapsing with inspiration.   Lipomatous atrial septal wall noted.   Fibrocalcific changes noted to the mitral valve leaflets with preserved   leaflet excursion.   Trace mitral regurgitation is present.   EA reversal of the mitral inflow consistent with reduced compliance of   the   left ventricle.   Fibrocalcific changes noted to the Aortic valve leaflets with preserved   leaflet excursion.   Moderate (2+) tricuspid valve regurgitation is present.   The estimated RVSP is 63 mmHg.    < end of copied text >      < from: CT Chest w/ IV Cont (06.22.19 @ 16:36) >    IMPRESSION:     No acute traumatic injury.     Age-indeterminate mild superior endplate compression fracture at T12.    Posterior right upper lobe consolidation, likely pneumonia. Follow-up to   complete resolution.    1.2 cm indeterminate left renal lesion. Follow-up nonemergent MRI abdomen   with and without contrast.      < end of copied text >    RADIOLOGY & ADDITIONAL STUDIES:    < from: CT Angio Chest PE Protocol w/ IV Cont (06.26.19 @ 12:42) >  FINDINGS: personally reveiwed    LOWER NECK: Within normal limits.  AXILLA, MEDIASTINUM AND VALERIY: No lymphadenopathy.  VESSELS: Pulmonary embolism involving the descending left pulmonary   artery, right main and interlobar pulmonary arteries, and multiple   segmental and subsegmental branches of all lobes.  Enlarged main   pulmonary artery measuring 3.7 cm. Atherosclerotic arterial   calcifications, including the coronary arteries.  Normal caliber aorta.  HEART: The heart is normal in size.No pericardial effusion.  PLEURA: Trace bilateral pleural effusions.   LUNGS AND LARGE AIRWAYS: Mixed groundglass and consolidative peripheral   opacity in the right upper lobe compatible with infarction. Small   groundglass opacity in the posterior left lower lobe.No suspicious   pulmonary nodule or mass. Benign bilateral calcified granulomata.. Patent   central airways.   VISUALIZED UPPER ABDOMEN: Bilateral renal indeterminate lesions,   measuring 1.4 cm on the right and 1.2 cm on the left. Hepatic cyst. Small   hiatal hernia.  BONES: No acute abnormality. Moderate T12 compression deformity, stable   since June 22, 2019.  CHEST WALL:  Unremarkable    IMPRESSION:  Extensive bilateral pulmonary embolism.    This finding was discussed with  on June 26, 2019, shortly   after the examination was performed.    Enlarged main pulmonary artery and straightening of the interventricular   septum suggestive of right heart strain.    Right upper lobe pulmonary infarct. Probable small left lower lobe   pulmonary infarct.    Bilateral indeterminate renal lesions. Recommend MRI to further evaluate.    < end of copied text >      lower ext DVT noted on doppler lower exts
Pt has been seen and examined with FP resident, resident supervised agree with a/p       Patient is a 83y old  Male who presents with a chief complaint of Fever    PHYSICAL EXAM:  Vital Signs Last 24 Hrs  T(C): 36.8 (27 Jun 2019 14:30), Max: 36.8 (26 Jun 2019 21:28)  T(F): 98.2 (27 Jun 2019 14:30), Max: 98.2 (26 Jun 2019 21:28)  HR: 105 (27 Jun 2019 13:01) (53 - 105)  BP: 160/89 (27 Jun 2019 13:01) (115/75 - 173/98)  BP(mean): 108 (27 Jun 2019 13:01) (87 - 119)  RR: 34 (27 Jun 2019 13:01) (17 - 34)  SpO2: 97% (27 Jun 2019 13:01) (89% - 97%)  general- comfortable   -rs-aeeb,cta  -cvs-s1s2 normal   -p/a-soft,bs+  -extremity- no asymmetrical swelling noted   -cns- non focal         A/P    #PE- ct lovenox, hematology evaluation     #pneumonia- ct abx     #ct to monitor in tele     #d/c iv fluids
Pt has been seen and examined with FP resident, resident supervised agree with a/p       Patient is a 83y old  Male who presents with a chief complaint of Fever  RUL Pna  Weakness  Fall  Abd Distension  Overflow Incontinence/Urinary Urgency (26 Jun 2019 11:01)        HPI:  Pt is an 82 y/o male with PMHx of HTN, BPH, diverticulosis, prostate CA, tubular adenoma who presents to the ED via EMS after a recurrent  fall  -pt was found unresponsive in am for short time while he was walking with PT. Just prior to have syncope episode he has bowel movement at floor. Seen and evaluated. On leading questions he mentioned that he feels short of breath and weak for probably weeks to month and had similar episode( but did not pass out) at home   -Bedside echo showed elevated pumonary arterial pressure (as per echo tech)    PHYSICAL EXAM:  Vital Signs Last 24 Hrs  T(C): 36.7 (26 Jun 2019 05:31), Max: 37.8 (25 Jun 2019 13:28)  T(F): 98.1 (26 Jun 2019 05:31), Max: 100.1 (25 Jun 2019 13:28)  HR: 103 (26 Jun 2019 09:48) (68 - 103)  BP: 177/103 (26 Jun 2019 09:48) (110/51 - 177/103)  BP(mean): --  RR: 22 (26 Jun 2019 09:48) (18 - 22)  SpO2: 95% (26 Jun 2019 09:48) (87% - 98%)  general- comfortable  -rs-aeeb, wheez +  -cvs-s1s2 normal   -p/a-soft,bs+  -extremity- no asymmetrical swelling noted   -cns- non focal         A/P    #Acute hypoxic respiratory failure- oxygen support     #Elevated pulmonary artery pressure and pass out- transfer to tele, iv fluids, CTA to rule out PE    #Syncope- most likely vasovagal, tele monitoring for now     #iv steroids stat and monitor him closely     #Above discussed with pt and all questions have been answered
CHIEF COMPLAINT:    HPI:  Pt is an 82 y/o male with PMHx of HTN, BPH, diverticulosis, prostate CA, tubular adenoma who presents to the ED via EMS after a recurrent  fall on the day of admission .    Pts reports he fell on that day while he was trying to urinate in the bathroom.  He denies LOC and reports chills, extreme weakness  and fatigue for a few  days, also assoc  bifrontal mild headache, 1-2 loose stool diarrhea and decreased appetite/PO intake. Pt saw Dr. Nik Zimmerman at 10 am this morning ,  and was prescribed cefuroxime for  chest congestion and  a sinus infection.     In the ED, pt was noted to be febrile with a rectal temp 103F.  He  denied neck pain, back pain, bilateral lower extremities pain. He reports 1 week of a non-productive cough.  No SOB or chest pain.  No hx of renal problems.  On 81mg every other day.      In the ED, he received Sepsis antibiotics with 2gm Cefepime and Vanco 1gm,  and 2400 ml of LR, 650mg tylenol.  patient was receiving IV fluid while in the hospital , patient did have pneumonia   patient was feeling ok , however felt more sob when he walked to bathroom  which got better with oxygen ,  his blood pressure was elevated ,  his iv fluids was discontinued , received Iv diuretic , which made his symptoms better . patient did have elevated blood pressure while in the hospital ,   called for cardiology consult as patient told he had leaky valve , and possible CHF     Patient denies any hx of MI or chf in the past     19   Events noted , patient did walk with physical therapy  patient did feel  sob , dizzy , hypoxic  unresponsive on the floor normal blood pressure  , patient was transferred to stepdown ccu , patient on oxygen ,  no sob at rest , patients echo showed elevated RVSP ,     Fam Hx:  Father  at 70 , Multiple Myeloma  Mother  at 91, Dementia (2019 20:26)      PAST MEDICAL & SURGICAL HISTORY:  Diverticulosis: sigmoid  Tubular adenoma  BPH (Benign Prostatic Hyperplasia)  History of prostate cancer: seed implant 2004  HTN (hypertension)  Sensorineural hearing loss: bilateral  Rhinitis  Deviated nasal septum  History of atypical nevus  S/P cataract surgery: left eye  Prostate ca: with seed implantation 2004  Mastoid disorder  History of tonsillectomy  History of rhinoplasty      Allergies    adhesives (Rash)  No Known Drug Allergies    Intolerances        SOCIAL HISTORY: former smoker     FAMILY HISTORY:    MEDICATIONS  (STANDING):  aspirin enteric coated 81 milliGRAM(s) Oral daily  azithromycin  IVPB 500 milliGRAM(s) IV Intermittent every 24 hours  azithromycin  IVPB      cefTRIAXone Injectable. 1000 milliGRAM(s) IV Push every 24 hours  doxazosin 4 milliGRAM(s) Oral at bedtime  heparin  Injectable 5000 Unit(s) SubCutaneous every 8 hours  methylPREDNISolone sodium succinate Injectable 60 milliGRAM(s) IV Push once  metoprolol succinate ER 25 milliGRAM(s) Oral daily  sodium chloride 0.45%. 1000 milliLiter(s) (75 mL/Hr) IV Continuous <Continuous>  sodium chloride 0.9%. 1000 milliLiter(s) (50 mL/Hr) IV Continuous <Continuous>  valsartan 160 milliGRAM(s) Oral daily    MEDICATIONS  (PRN):  acetaminophen   Tablet .. 650 milliGRAM(s) Oral every 6 hours PRN Temp greater or equal to 38C (100.4F)  hydrALAZINE Injectable 5 milliGRAM(s) IV Push every 1 hour PRN SBP > 180    REVIEW OF SYSTEMS:  as above     All other review of systems is negative unless indicated above    Vital Signs Last 24 Hrs  T(C): 36.7 (2019 05:31), Max: 37.8 (2019 13:28)  T(F): 98.1 (2019 05:31), Max: 100.1 (2019 13:28)  HR: 103 (2019 09:48) (68 - 103)  BP: 177/103 (2019 09:48) (110/51 - 177/103)  BP(mean): --  RR: 22 (2019 09:48) (18 - 22)  SpO2: 95% (2019 09:48) (87% - 98%)    I&O's Summary    2019 07:01  -  2019 07:00  --------------------------------------------------------  IN: 240 mL / OUT: 1125 mL / NET: -885 mL          PHYSICAL EXAM:    Constitutional: NAD, awake and alert, well-developed  HEENT: PERR, EOMI,  No oral cyananosis.  Neck:  supple,  No JVD  Respiratory: Breath sounds are clear bilaterally decreased   Cardiovascular: S1 and S2, regular rate and rhythm, no Murmurs, gallops or rubs  Gastrointestinal: Bowel Sounds present, soft, nontender.   Extremities: No peripheral edema. No clubbing or cyanosis.  Vascular: 2+ peripheral pulses  Neurological: A/O x 3, no focal deficits  Musculoskeletal: no calf tenderness.  Skin: No rashes.      LABS: All Labs Reviewed:                        144  |  110<H>  |  18  ----------------------------<  101<H>  3.5   |  27  |  1.00    Ca    8.9      2019 08:14  Mg     2.3           CARDIAC MARKERS ( 2019 19:32 )  0.069 ng/mL / x     / x     / x     / x      CARDIAC MARKERS ( 2019 16:51 )  0.088 ng/mL / x     / x     / x     / x                  Culture Results:   No enteric pathogens to date: Final culture pending (19 @ 21:00)  Culture Results:   Testing in progress (19 @ 20:45)  Culture Results:   No growth to date. (19 @ 14:55)  Culture Results:   No growth to date. (19 @ 14:45)  Culture Results:   No growth (19 @ 14:45)        RADIOLOGY/EKG:< from: 12 Lead ECG (19 @ 14:35) >  Sinus rhythm with Premature atrial complexes  Moderate voltage criteria for LVH, may be normal variant  Nonspecific ST abnormality  Abnormal ECG  No previous ECGs available  Confirmed by MD SHEFFIELD PAUL ([id number]) on 2019 11:43:43 PM    < end of copied text >    < from: Xray Chest 1 View- PORTABLE-Urgent (19 @ 15:05) >  IMPRESSION: Right upper lobe pneumonia, unchanged since 19    < end of copied text >    < from: 12 Lead ECG (19 @ 15:18) >  Normal sinus rhythm  Moderate voltage criteria for LVH, may be normal variant  Borderline ECG  When compared with ECG of 2019 14:35,  Premature atrial complexes are no longer Present  Confirmed by DUTCH PRATT MD ([id number]) on 2019 6:31:20 AM    < end of copied text >    EKG 6..19  sinus tachycardia , non specific St changes
CHIEF COMPLAINT:    HPI: Pt is an 82 y/o male with PMHx of HTN, BPH, diverticulosis, prostate CA, tubular adenoma who presents to the ED via EMS after a   fall- preceeding the fall he had been feeling fatigued and weak for a few days and also reported 1-2 loose stools presents with weakness and fall. He had been prescribed cefuoxine for chest congestion and sinus infection . In the ED febrile 103 received  2gm Cefepime and Vanco 1gm,  and 2400 ml of LR, 650mg tylenol.    Today the pateint is lying flat comfortably in bed; lying flat; no acute distress but does appear slightly lethargic. Is AxOx3 ; hemodynamically stable; says is feeling better as compared to when he was admitted . Has been spiking fever intermittently        CONSTITUTIONAL: weakness +ve , fevers+ve    RESPIRATORY: cough +ve no shortness of breath at present  CARDIOVASCULAR: No chest pain or palpitations  GASTROINTESTINAL: No abdominal or epigastric pain. No nausea, vomiting, or hematemesis  GENITOURINARY: No dysuria, frequency or hematuria  NEUROLOGICAL: No numbness or weakness    Vital Signs Last 24 Hrs  T(C): 38.3 (23 Jun 2019 15:19), Max: 39.3 (22 Jun 2019 21:20)  T(F): 100.9 (23 Jun 2019 15:19), Max: 102.8 (22 Jun 2019 21:20)  HR: 71 (23 Jun 2019 10:59) (71 - 91)  BP: 135/70 (23 Jun 2019 10:59) (128/63 - 172/87)    RR: 17 (23 Jun 2019 10:59) (17 - 20)  SpO2: 95% (23 Jun 2019 10:59) (95% - 99%)    I&O's Summary    23 Jun 2019 07:01  -  23 Jun 2019 16:10  --------------------------------------------------------  IN: 800 mL / OUT: 0 mL / NET: 800 mL                  PHYSICAL EXAM:    Constitutional: NAD, awake and alert, well-developed  HEENT: PERR, EOMI, Normal Hearing, MMM  Neck: Soft and supple, No LAD, No JVD  Respiratory: symmetric air entry biaterally , rhonchi on right - some crackles at lung bases  Cardiovascular: S1 and S2, regular rate and rhythm, no Murmurs, gallops or rubs  Gastrointestinal: Bowel Sounds present, soft, nontender, nondistended, no guarding, no rebound  Extremities: No peripheral edema  Vascular: 2+ peripheral pulses  Neurological: A/O x 3, no focal deficits  Musculoskeletal: 5/5 strength b/l upper and lower extremities  Skin: No rashes    MEDICATIONS:  MEDICATIONS  (STANDING):  aspirin enteric coated 81 milliGRAM(s) Oral daily  azithromycin  IVPB      cefTRIAXone Injectable. 1000 milliGRAM(s) IV Push every 24 hours  doxazosin 4 milliGRAM(s) Oral at bedtime  heparin  Injectable 5000 Unit(s) SubCutaneous every 8 hours  metoprolol succinate ER 12.5 milliGRAM(s) Oral daily  sodium chloride 0.9%. 1000 milliLiter(s) (75 mL/Hr) IV Continuous <Continuous>      LABS: All Labs Reviewed:                        15.5   12.76 )-----------( 207      ( 22 Jun 2019 14:45 )             45.6     06-22    138  |  103  |  21  ----------------------------<  123<H>  3.1<L>   |  26  |  1.50<H>    Ca    9.3      22 Jun 2019 14:45    TPro  8.0  /  Alb  3.5  /  TBili  0.8  /  DBili  x   /  AST  18  /  ALT  16  /  AlkPhos  68  06-22    PT/INR - ( 22 Jun 2019 14:45 )   PT: 14.6 sec;   INR: 1.30 ratio         PTT - ( 22 Jun 2019 14:45 )  PTT:31.3 sec      Blood Culture: 06-22 @ 14:45  Organism --  Gram Stain Blood -- Gram Stain --  Specimen Source .Urine Clean Catch (Midstream)  Culture-Blood --
Called to see the patient on 3N for pulmonary emboli    HPI:  Pt is an 82 y/o male who presented to the ED after a recurrent falls.   Patient was admitted and treated for pneumonia.  Today he had another near syncopal episodes while with PT.  He went for a CTA chest and was found to have B/L PE.  Patient hot hypotensive or tachycardiac.  Ro RV strain on ECHO but an elevated PA pressure.  Patient without CP and on 5L NC.      no Hx of recent air or car travel no calf pain.  No Fx hx of VTE    Fam Hx:  Father  at 70 , Multiple Myeloma  Mother  at 91, Dementia (2019 20:26)       PAST MEDICAL & SURGICAL HISTORY:  Diverticulosis: sigmoid  Tubular adenoma  BPH (Benign Prostatic Hyperplasia)  History of prostate cancer: seed implant 2004  HTN (hypertension)  Sensorineural hearing loss: bilateral  Rhinitis  Deviated nasal septum  History of atypical nevus  S/P cataract surgery: left eye  Prostate ca: with seed implantation 2004  Mastoid disorder  History of tonsillectomy  History of rhinoplasty      FAMILY HISTORY:      Social Hx:    Allergies    adhesives (Rash)  No Known Drug Allergies    Intolerances            ICU Vital Signs Last 24 Hrs  T(C): 36.3 (2019 20:02), Max: 37.4 (2019 05:28)  T(F): 97.3 (2019 20:02), Max: 99.4 (2019 05:28)  HR: 82 (2019 20:02) (68 - 108)  BP: 148/82 (2019 20:02) (110/51 - 177/103)  BP(mean): 95 (2019 16:00) (95 - 101)  ABP: --  ABP(mean): --  RR: 17 (2019 20:02) (17 - 31)  SpO2: 95% (2019 20:02) (89% - 98%)          I&O's Summary    2019 07:01  -  2019 07:00  --------------------------------------------------------  IN: 240 mL / OUT: 1125 mL / NET: -885 mL                144  |  110<H>  |  18  ----------------------------<  101<H>  3.5   |  27  |  1.00    Ca    8.9      2019 08:14  Mg     2.3     06-26        CARDIAC MARKERS ( 2019 19:32 )  0.069 ng/mL / x     / x     / x     / x      CARDIAC MARKERS ( 2019 16:51 )  0.088 ng/mL / x     / x     / x     / x            ABG - ( 2019 19:43 )  pH, Arterial: 7.50  pH, Blood: x     /  pCO2: 29    /  pO2: 68    / HCO3: 22    / Base Excess: .3    /  SaO2: 93                      MEDICATIONS  (STANDING):  aspirin enteric coated 81 milliGRAM(s) Oral daily  azithromycin  IVPB 500 milliGRAM(s) IV Intermittent every 24 hours  azithromycin  IVPB      cefTRIAXone Injectable. 1000 milliGRAM(s) IV Push every 24 hours  doxazosin 4 milliGRAM(s) Oral at bedtime  enoxaparin Injectable 80 milliGRAM(s) SubCutaneous two times a day  furosemide   Injectable 20 milliGRAM(s) IV Push once  metoprolol succinate ER 25 milliGRAM(s) Oral daily  sodium chloride 0.45%. 1000 milliLiter(s) (75 mL/Hr) IV Continuous <Continuous>  sodium chloride 0.9%. 1000 milliLiter(s) (50 mL/Hr) IV Continuous <Continuous>  valsartan 160 milliGRAM(s) Oral daily    MEDICATIONS  (PRN):  acetaminophen   Tablet .. 650 milliGRAM(s) Oral every 6 hours PRN Temp greater or equal to 38C (100.4F)  hydrALAZINE Injectable 5 milliGRAM(s) IV Push every 1 hour PRN SBP > 180      DVT Prophylaxis: Lovenox    Advanced Directives:  Discussed with:    Visit Information: 30 min    ** Time is exclusive of billed procedures and/or teaching and/or routine family updates.
Date of service: 19 @ 09:35    Lying in bed in NAD  Feels better  Has dry cough  SOB is improved  Fever is down    ROS: denies dizziness, no HA, no abdominal pain, no diarrhea or constipation; no dysuria, no legs pain, no rashes    MEDICATIONS  (STANDING):  aspirin enteric coated 81 milliGRAM(s) Oral daily  azithromycin  IVPB 500 milliGRAM(s) IV Intermittent every 24 hours  azithromycin  IVPB      cefTRIAXone Injectable. 1000 milliGRAM(s) IV Push every 24 hours  doxazosin 4 milliGRAM(s) Oral at bedtime  heparin  Injectable 5000 Unit(s) SubCutaneous every 8 hours  metoprolol succinate ER 25 milliGRAM(s) Oral daily  sodium chloride 0.9%. 1000 milliLiter(s) (50 mL/Hr) IV Continuous <Continuous>  valsartan 160 milliGRAM(s) Oral daily      Vital Signs Last 24 Hrs  T(C): 36.7 (2019 05:31), Max: 37.8 (2019 13:28)  T(F): 98.1 (2019 05:31), Max: 100.1 (2019 13:28)  HR: 70 (2019 05:31) (68 - 88)  BP: 110/51 (2019 05:31) (110/51 - 155/85)  BP(mean): --  RR: 18 (2019 05:31) (18 - 21)  SpO2: 98% (2019 05:31) (87% - 98%)    Physical Exam:      Constitutional: frail looking  HEENT: NC/AT, EOMI, PERRLA, conjunctivae clear  Neck: supple; thyroid not palpable  Back: no tenderness  Respiratory: respiratory effort normal; few crackles at bases  Cardiovascular: S1S2 regular, no murmurs  Abdomen: soft, not tender, not distended, positive BS  Genitourinary: no suprapubic tenderness  Lymphatic: no LN palpable  Musculoskeletal: no muscle tenderness, no joint swelling or tenderness  Extremities: no pedal edema  Neurological/ Psychiatric: AxOx3, moving all extremities  Skin: no rashes; no palpable lesions    Labs: reviewed        144  |  110<H>  |  18  ----------------------------<  101<H>  3.5   |  27  |  1.00    Ca    8.9      2019 08:14  Mg     2.3                             13.8   8.13  )-----------( 177      ( 2019 08:34 )             41.3     -24    141  |  107  |  20  ----------------------------<  105<H>  3.5   |  25  |  1.18    Ca    8.9      2019 08:34    TPro  7.3  /  Alb  2.7<L>  /  TBili  0.6  /  DBili  x   /  AST  47<H>  /  ALT  24  /  AlkPhos  52                          15.5   12.76 )-----------( 207      ( 2019 14:45 )             45.6     06-22    138  |  103  |  21  ----------------------------<  123<H>  3.1<L>   |  26  |  1.50<H>    Ca    9.3      2019 14:45    TPro  8.0  /  Alb  3.5  /  TBili  0.8  /  DBili  x   /  AST  18  /  ALT  16  /  AlkPhos  68  06-22     LIVER FUNCTIONS - ( 2019 14:45 )  Alb: 3.5 g/dL / Pro: 8.0 gm/dL / ALK PHOS: 68 U/L / ALT: 16 U/L / AST: 18 U/L / GGT: x           Urinalysis Basic - ( 2019 14:45 )    Color: Yellow / Appearance: Clear / S.015 / pH: x  Gluc: x / Ketone: Small  / Bili: Negative / Urobili: 1 mg/dL   Blood: x / Protein: 30 mg/dL / Nitrite: Negative   Leuk Esterase: Negative / RBC: 3-5 /HPF / WBC 3-5   Sq Epi: x / Non Sq Epi: Few / Bacteria: Few      Culture - Blood (collected 2019 14:55)  Source: .Blood Blood-Peripheral  Preliminary Report (2019 20:01):    No growth to date.    Culture - Urine (collected 2019 14:45)  Source: .Urine Clean Catch (Midstream)  Final Report (2019 12:17):    No growth    Culture - Blood (collected 2019 14:45)  Source: .Blood Blood-Peripheral  Preliminary Report (2019 20:01):    No growth to date.    Radiology: all available radiological tests reviewed    < from: CT Chest w/ IV Cont (19 @ 16:36) >  No acute traumatic injury.   Age-indeterminate mild superior endplate compression fracture at T12.  Posterior right upper lobe consolidation, likely pneumonia. Follow-up to   complete resolution.  1.2 cm indeterminate left renal lesion. Follow-up nonemergent MRI abdomen   with and without contrast.    < end of copied text >      Advanced directives addressed: full resuscitation
Date of service: 19 @ 11:39    OOB to chair  Weak looking  Feels better  Has low grade fever  No SOB at rest    ROS: denies dizziness, no HA, has dry cough, no abdominal pain, no diarrhea or constipation; no dysuria, no legs pain, no rashes    MEDICATIONS  (STANDING):  aspirin enteric coated 81 milliGRAM(s) Oral daily  cefepime  Injectable.      cefepime  Injectable. 1000 milliGRAM(s) IV Push every 12 hours  doxazosin 4 milliGRAM(s) Oral at bedtime  enoxaparin Injectable 80 milliGRAM(s) SubCutaneous every 12 hours  furosemide   Injectable 20 milliGRAM(s) IV Push once  metoprolol succinate ER 25 milliGRAM(s) Oral daily  valsartan 160 milliGRAM(s) Oral daily      Vital Signs Last 24 Hrs  T(C): 37 (2019 08:42), Max: 37.8 (2019 18:29)  T(F): 98.6 (2019 08:42), Max: 100 (2019 18:29)  HR: 92 (2019 10:08) (63 - 110)  BP: 141/70 (2019 10:08) (130/77 - 176/99)  BP(mean): 86 (2019 10:08) (86 - 120)  RR: 24 (2019 10:08) (17 - 34)  SpO2: 96% (2019 10:08) (92% - 97%)    Physical Exam:      Constitutional: frail looking  HEENT: NC/AT, EOMI, PERRLA, conjunctivae clear  Neck: supple; thyroid not palpable  Back: no tenderness  Respiratory: respiratory effort normal; few crackles at bases  Cardiovascular: S1S2 regular, no murmurs  Abdomen: soft, not tender, not distended, positive BS  Genitourinary: no suprapubic tenderness  Lymphatic: no LN palpable  Musculoskeletal: no muscle tenderness, no joint swelling or tenderness  Extremities: no pedal edema  Neurological/ Psychiatric: AxOx3, moving all extremities  Skin: no rashes; no palpable lesions    Labs: reviewed                           1216 )-----------( 263      ( 2019 07:02 )             38.2     -    146<H>  |  112<H>  |  21  ----------------------------<  99  3.4<L>   |  23  |  0.96    Ca    8.8      2019 07:02  Phos  4.2         TPro  6.2  /  Alb  2.3<L>  /  TBili  0.4  /  DBili  x   /  AST  71<H>  /  ALT  67  /  AlkPhos  52                                    15.5   12.76 )-----------( 207      ( 2019 14:45 )             45.6     06    138  |  103  |  21  ----------------------------<  123<H>  3.1<L>   |  26  |  1.50<H>    Ca    9.3      2019 14:45    TPro  8.0  /  Alb  3.5  /  TBili  0.8  /  DBili  x   /  AST  18  /  ALT  16  /  AlkPhos  68  22     LIVER FUNCTIONS - ( 2019 14:45 )  Alb: 3.5 g/dL / Pro: 8.0 gm/dL / ALK PHOS: 68 U/L / ALT: 16 U/L / AST: 18 U/L / GGT: x           Urinalysis Basic - ( 2019 14:45 )    Color: Yellow / Appearance: Clear / S.015 / pH: x  Gluc: x / Ketone: Small  / Bili: Negative / Urobili: 1 mg/dL   Blood: x / Protein: 30 mg/dL / Nitrite: Negative   Leuk Esterase: Negative / RBC: 3-5 /HPF / WBC 3-5   Sq Epi: x / Non Sq Epi: Few / Bacteria: Few      Culture - Blood (collected 2019 14:55)  Source: .Blood Blood-Peripheral  Preliminary Report (2019 20:01):    No growth to date.    Culture - Urine (collected 2019 14:45)  Source: .Urine Clean Catch (Midstream)  Final Report (2019 12:17):    No growth    Culture - Blood (collected 2019 14:45)  Source: .Blood Blood-Peripheral  Preliminary Report (2019 20:01):    No growth to date.    Radiology: all available radiological tests reviewed    < from: CT Chest w/ IV Cont (06.22.19 @ 16:36) >  No acute traumatic injury.   Age-indeterminate mild superior endplate compression fracture at T12.  Posterior right upper lobe consolidation, likely pneumonia. Follow-up to   complete resolution.  1.2 cm indeterminate left renal lesion. Follow-up nonemergent MRI abdomen   with and without contrast.    < end of copied text >    < from: CT Angio Chest PE Protocol w/ IV Cont (19 @ 12:42) >  Extensive bilateral pulmonary embolism.  This finding was discussed with  on 2019, shortly   after the examination was performed.  Enlarged main pulmonary artery and straightening of the interventricular   septum suggestive of right heart strain.  Right upper lobe pulmonary infarct. Probable small left lower lobe   pulmonary infarct.  Bilateral indeterminate renal lesions. Recommend MRI to further evaluate.    < end of copied text >      Advanced directives addressed: full resuscitation
Date of service: 19 @ 12:03    Lying in bed in NAD  Weak looking  Febrile to 102.4F overnight  MIld SOB at rest  Has cough    ROS: no fever or chills; no HA, no abdominal pain, no diarrhea or constipation; no dysuria, no legs pain, no rashes    MEDICATIONS  (STANDING):  aspirin enteric coated 81 milliGRAM(s) Oral daily  azithromycin  IVPB 500 milliGRAM(s) IV Intermittent every 24 hours  azithromycin  IVPB      cefTRIAXone Injectable. 1000 milliGRAM(s) IV Push every 24 hours  doxazosin 4 milliGRAM(s) Oral at bedtime  heparin  Injectable 5000 Unit(s) SubCutaneous every 8 hours  metoprolol succinate ER 12.5 milliGRAM(s) Oral daily  sodium chloride 0.9%. 1000 milliLiter(s) (75 mL/Hr) IV Continuous <Continuous>      Vital Signs Last 24 Hrs  T(C): 37.1 (2019 10:38), Max: 39.1 (2019 22:31)  T(F): 98.7 (2019 10:38), Max: 102.4 (2019 22:31)  HR: 90 (2019 10:38) (64 - 90)  BP: 168/81 (2019 10:38) (110/63 - 175/82)  BP(mean): --  RR: 18 (2019 10:38) (16 - 18)  SpO2: 94% (2019 10:38) (94% - 95%)    Physical Exam:      Constitutional: frail looking  HEENT: NC/AT, EOMI, PERRLA, conjunctivae clear  Neck: supple; thyroid not palpable  Back: no tenderness  Respiratory: respiratory effort normal; crackles at bases  Cardiovascular: S1S2 regular, no murmurs  Abdomen: soft, not tender, not distended, positive BS  Genitourinary: no suprapubic tenderness  Lymphatic: no LN palpable  Musculoskeletal: no muscle tenderness, no joint swelling or tenderness  Extremities: no pedal edema  Neurological/ Psychiatric: AxOx3, moving all extremities  Skin: no rashes; no palpable lesions    Labs: reviewed                        13.8   8.13  )-----------( 177      ( 2019 08:34 )             41.3         141  |  107  |  20  ----------------------------<  105<H>  3.5   |  25  |  1.18    Ca    8.9      2019 08:34    TPro  7.3  /  Alb  2.7<L>  /  TBili  0.6  /  DBili  x   /  AST  47<H>  /  ALT  24  /  AlkPhos  52                          15.5   12.76 )-----------( 207      ( 2019 14:45 )             45.6     06    138  |  103  |  21  ----------------------------<  123<H>  3.1<L>   |  26  |  1.50<H>    Ca    9.3      2019 14:45    TPro  8.0  /  Alb  3.5  /  TBili  0.8  /  DBili  x   /  AST  18  /  ALT  16  /  AlkPhos  68  06-22     LIVER FUNCTIONS - ( 2019 14:45 )  Alb: 3.5 g/dL / Pro: 8.0 gm/dL / ALK PHOS: 68 U/L / ALT: 16 U/L / AST: 18 U/L / GGT: x           Urinalysis Basic - ( 2019 14:45 )    Color: Yellow / Appearance: Clear / S.015 / pH: x  Gluc: x / Ketone: Small  / Bili: Negative / Urobili: 1 mg/dL   Blood: x / Protein: 30 mg/dL / Nitrite: Negative   Leuk Esterase: Negative / RBC: 3-5 /HPF / WBC 3-5   Sq Epi: x / Non Sq Epi: Few / Bacteria: Few      Culture - Blood (collected 2019 14:55)  Source: .Blood Blood-Peripheral  Preliminary Report (2019 20:01):    No growth to date.    Culture - Urine (collected 2019 14:45)  Source: .Urine Clean Catch (Midstream)  Final Report (2019 12:17):    No growth    Culture - Blood (collected 2019 14:45)  Source: .Blood Blood-Peripheral  Preliminary Report (2019 20:01):    No growth to date.    Radiology: all available radiological tests reviewed    < from: CT Chest w/ IV Cont (19 @ 16:36) >  No acute traumatic injury.   Age-indeterminate mild superior endplate compression fracture at T12.  Posterior right upper lobe consolidation, likely pneumonia. Follow-up to   complete resolution.  1.2 cm indeterminate left renal lesion. Follow-up nonemergent MRI abdomen   with and without contrast.    < end of copied text >      Advanced directives addressed: full resuscitation
Date of service: 19 @ 13:19    Sitting in bed in NAD  Weak looking  No SOB or cough    ROS: no fever or chills; denies dizziness, no HA, no abdominal pain, no diarrhea or constipation; no dysuria, no legs pain, no rashes    MEDICATIONS  (STANDING):  aspirin enteric coated 81 milliGRAM(s) Oral daily  cefepime  Injectable.      cefepime  Injectable. 1000 milliGRAM(s) IV Push every 12 hours  doxazosin 4 milliGRAM(s) Oral at bedtime  furosemide   Injectable 20 milliGRAM(s) IV Push once  lidocaine   Patch 1 Patch Transdermal daily  metoprolol succinate ER 25 milliGRAM(s) Oral every 12 hours  rivaroxaban 15 milliGRAM(s) Oral two times a day with meals  valsartan 320 milliGRAM(s) Oral daily      Vital Signs Last 24 Hrs  T(C): 37.1 (2019 06:51), Max: 37.5 (2019 16:35)  T(F): 98.7 (2019 06:51), Max: 99.5 (2019 16:35)  HR: 86 (2019 11:00) (68 - 97)  BP: 156/86 (2019 11:00) (135/81 - 172/93)  BP(mean): 104 (2019 11:00) (98 - 115)  RR: 19 (2019 11:00) (17 - 24)  SpO2: 97% (2019 11:00) (91% - 99%)    Physical Exam:      Constitutional: frail looking  HEENT: NC/AT, EOMI, PERRLA, conjunctivae clear  Neck: supple; thyroid not palpable  Back: no tenderness  Respiratory: respiratory effort normal; few crackles at bases  Cardiovascular: S1S2 regular, no murmurs  Abdomen: soft, not tender, not distended, positive BS  Genitourinary: no suprapubic tenderness  Lymphatic: no LN palpable  Musculoskeletal: no muscle tenderness, no joint swelling or tenderness  Extremities: no pedal edema  Neurological/ Psychiatric: AxOx3, moving all extremities  Skin: no rashes; no palpable lesions    Labs: reviewed                        12.9   12.16 )-----------( 263      ( 2019 07:02 )             38.2     06-    146<H>  |  112<H>  |  21  ----------------------------<  99  3.4<L>   |  23  |  0.96    Ca    8.8      2019 07:02  Phos  4.2         TPro  6.2  /  Alb  2.3<L>  /  TBili  0.4  /  DBili  x   /  AST  71<H>  /  ALT  67  /  AlkPhos  52                                    15.5   12.76 )-----------( 207      ( 2019 14:45 )             45.6     06    138  |  103  |  21  ----------------------------<  123<H>  3.1<L>   |  26  |  1.50<H>    Ca    9.3      2019 14:45    TPro  8.0  /  Alb  3.5  /  TBili  0.8  /  DBili  x   /  AST  18  /  ALT  16  /  AlkPhos  68       LIVER FUNCTIONS - ( 2019 14:45 )  Alb: 3.5 g/dL / Pro: 8.0 gm/dL / ALK PHOS: 68 U/L / ALT: 16 U/L / AST: 18 U/L / GGT: x           Urinalysis Basic - ( 2019 14:45 )    Color: Yellow / Appearance: Clear / S.015 / pH: x  Gluc: x / Ketone: Small  / Bili: Negative / Urobili: 1 mg/dL   Blood: x / Protein: 30 mg/dL / Nitrite: Negative   Leuk Esterase: Negative / RBC: 3-5 /HPF / WBC 3-5   Sq Epi: x / Non Sq Epi: Few / Bacteria: Few      Culture - Blood (collected 2019 14:55)  Source: .Blood Blood-Peripheral  Preliminary Report (2019 20:01):    No growth to date.    Culture - Urine (collected 2019 14:45)  Source: .Urine Clean Catch (Midstream)  Final Report (2019 12:17):    No growth    Culture - Blood (collected 2019 14:45)  Source: .Blood Blood-Peripheral  Preliminary Report (2019 20:01):    No growth to date.    Radiology: all available radiological tests reviewed    < from: CT Chest w/ IV Cont (19 @ 16:36) >  No acute traumatic injury.   Age-indeterminate mild superior endplate compression fracture at T12.  Posterior right upper lobe consolidation, likely pneumonia. Follow-up to   complete resolution.  1.2 cm indeterminate left renal lesion. Follow-up nonemergent MRI abdomen   with and without contrast.    < end of copied text >    < from: CT Angio Chest PE Protocol w/ IV Cont (19 @ 12:42) >  Extensive bilateral pulmonary embolism.  This finding was discussed with  on 2019, shortly   after the examination was performed.  Enlarged main pulmonary artery and straightening of the interventricular   septum suggestive of right heart strain.  Right upper lobe pulmonary infarct. Probable small left lower lobe   pulmonary infarct.  Bilateral indeterminate renal lesions. Recommend MRI to further evaluate.    < end of copied text >      Advanced directives addressed: full resuscitation
HPI:  Pt is an 82 y/o male with PMHx of HTN, BPH, diverticulosis, prostate CA, tubular adenoma who presents to the ED via EMS after a recurrent  fall on the day of admission .    Pts reports he fell on that day while he was trying to urinate in the bathroom.  He denies LOC and reports chills, extreme weakness  and fatigue for a few  days, also assoc  bifrontal mild headache, 1-2 loose stool diarrhea and decreased appetite/PO intake. Pt saw Dr. Nik Zimmerman at 10 am this morning ,  and was prescribed cefuroxime for  chest congestion and  a sinus infection.     In the ED, pt was noted to be febrile with a rectal temp 103F.  He  denied neck pain, back pain, bilateral lower extremities pain. He reports 1 week of a non-productive cough.  No SOB or chest pain.  No hx of renal problems.  On 81mg every other day.      In the ED, he received Sepsis antibiotics with 2gm Cefepime and Vanco 1gm,  and 2400 ml of LR, 650mg tylenol.  patient was receiving IV fluid while in the hospital , patient did have pneumonia   patient was feeling ok , however felt more sob when he walked to bathroom  which got better with oxygen ,  his blood pressure was elevated ,  his iv fluids was discontinued , received Iv diuretic , which made his symptoms better . patient did have elevated blood pressure while in the hospital ,   called for cardiology consult as patient told he had leaky valve , and possible CHF     Patient denies any hx of MI or chf in the past     19   Events noted , patient did walk with physical therapy  patient did feel  sob , dizzy , hypoxic  unresponsive on the floor normal blood pressure  , patient was transferred to stepdown ccu , patient on oxygen ,  no sob at rest , patients echo showed elevated RVSP    19: Developed pulmonary embolus yesterday and started on lovenox. No CP .  Breathing reasonable.     Fam Hx:  Father  at 70 , Multiple Myeloma  Mother  at 91, Dementia (2019 20:26)      PAST MEDICAL & SURGICAL HISTORY:  Diverticulosis: sigmoid  Tubular adenoma  BPH (Benign Prostatic Hyperplasia)  History of prostate cancer: seed implant 2004  HTN (hypertension)  Sensorineural hearing loss: bilateral  Rhinitis  Deviated nasal septum  History of atypical nevus  S/P cataract surgery: left eye  Prostate ca: with seed implantation 2004  Mastoid disorder  History of tonsillectomy  History of rhinoplasty      Allergies    adhesives (Rash)  No Known Drug Allergies    Intolerances        SOCIAL HISTORY: former smoker     MEDICATIONS  (STANDING):  aspirin enteric coated 81 milliGRAM(s) Oral daily  azithromycin  IVPB 500 milliGRAM(s) IV Intermittent every 24 hours  azithromycin  IVPB      cefepime  Injectable.      cefepime  Injectable. 1000 milliGRAM(s) IV Push every 12 hours  doxazosin 4 milliGRAM(s) Oral at bedtime  enoxaparin Injectable 80 milliGRAM(s) SubCutaneous every 12 hours  furosemide   Injectable 20 milliGRAM(s) IV Push once  metoprolol succinate ER 25 milliGRAM(s) Oral daily  sodium chloride 0.45%. 1000 milliLiter(s) (75 mL/Hr) IV Continuous <Continuous>  sodium chloride 0.9%. 1000 milliLiter(s) (50 mL/Hr) IV Continuous <Continuous>  valsartan 160 milliGRAM(s) Oral daily    MEDICATIONS  (PRN):  acetaminophen   Tablet .. 650 milliGRAM(s) Oral every 6 hours PRN Temp greater or equal to 38C (100.4F)  hydrALAZINE Injectable 5 milliGRAM(s) IV Push every 1 hour PRN SBP > 180      Vital Signs Last 24 Hrs  T(C): 36.6 (2019 06:53), Max: 36.8 (2019 21:28)  T(F): 97.8 (2019 06:53), Max: 98.2 (2019 21:28)  HR: 56 (2019 04:00) (55 - 108)  BP: 143/78 (2019 04:00) (115/75 - 177/103)  BP(mean): 97 (2019 04:00) (87 - 106)  RR: 23 (2019 04:00) (17 - 31)  SpO2: 90% (2019 04:00) (89% - 95%)    I&O's Detail    2019 07:01  -  2019 07:00  --------------------------------------------------------  IN:    Oral Fluid: 240 mL  Total IN: 240 mL    OUT:    Voided: 1125 mL  Total OUT: 1125 mL    Total NET: -885 mL      PHYSICAL EXAM:    Constitutional: NAD, awake and alert, well-developed  HEENT: PERR, EOMI,  No oral cyananosis.  Neck:  supple,  No JVD  Respiratory: Breath sounds are clear bilaterally decreased at bases only.   Cardiovascular: S1 and S2, regular rate and rhythm, 1/6 APOLONIA to LLSB. No s3.  Gastrointestinal: Bowel Sounds present, soft, nontender.   Extremities: No peripheral edema. No clubbing or cyanosis.  Vascular: 2+ peripheral pulses  Neurological: A/O x 3, no focal deficits  Musculoskeletal: no calf tenderness.  Skin: No rashes.    LABS: All Labs Reviewed:                        .3   14 )-----------( 210      ( 2019 06:11 )             36.1         145  |  112<H>  |  25<H>  ----------------------------<  101<H>  3.7   |  24  |  1.09    Ca    8.8      2019 06:11  Phos  4.2       Mg     2.3         TPro  6.2  /  Alb  2.3<L>  /  TBili  0.4  /  DBili  x   /  AST  71<H>  /  ALT  67  /  AlkPhos  52      CARDIAC MARKERS ( 2019 06:11 )  0.155 ng/mL / x     / x     / x     / x      CARDIAC MARKERS ( 2019 22:16 )  0.317 ng/mL / x     / x     / x     / x      CARDIAC MARKERS ( 2019 19:32 )  0.069 ng/mL / x     / x     / x     / x      CARDIAC MARKERS ( 2019 16:51 )  0.088 ng/mL / x     / x     / x     / x          LIVER FUNCTIONS - ( 2019 06:11 )  Alb: 2.3 g/dL / Pro: 6.2 gm/dL / ALK PHOS: 52 U/L / ALT: 67 U/L / AST: 71 U/L / GGT: x           RADIOLOGY/EKG:< from: 12 Lead ECG (19 @ 14:35) >  Sinus rhythm with Premature atrial complexes  Moderate voltage criteria for LVH, may be normal variant  Nonspecific ST abnormality  Abnormal ECG  No previous ECGs available  Confirmed by MD SHEFFIELD PAUL ([id number]) on 2019 11:43:43 PM    < end of copied text >    < from: Xray Chest 1 View- PORTABLE-Urgent (19 @ 15:05) >  IMPRESSION: Right upper lobe pneumonia, unchanged since 19    < end of copied text >    < from: 12 Lead ECG (19 @ 15:18) >  Normal sinus rhythm  Moderate voltage criteria for LVH, may be normal variant  Borderline ECG  When compared with ECG of 2019 14:35,  Premature atrial complexes are no longer Present  Confirmed by DUTCH PRATT MD ([id number]) on 2019 6:31:20 AM    < end of copied text >    EKG 19  sinus tachycardia , non specific St changes     < from: Transthoracic Echocardiogram (19 @ 10:54) >  Summary     Mild concentric left ventricular hypertrophy is present.   Left ventricular wallmotion appears hyperdynamic.   Estimated left ventricular ejection fraction is 70 %.   The IVC appears minimally dilated and is collapsing with inspiration.   Lipomatous atrial septal wall noted.   Fibrocalcific changes noted to the mitral valve leaflets with preserved   leaflet excursion.   Trace mitral regurgitation is present.   EA reversal of the mitral inflow consistent with reduced compliance of   the   left ventricle.   Fibrocalcific changes noted to the Aortic valve leaflets with preserved   leaflet excursion.   Moderate (2+) tricuspid valve regurgitation is present.   The estimated RVSP is 63 mmHg.  < from: Transthoracic Echocardiogram (19 @ 10:54) >   Right Atrium   Normal appearing right atrium.   Right Ventricle   Normal appearing right ventricle structure and function.    < end of copied text >
HPI: Pt is an 82 y/o male with PMHx of HTN, BPH, diverticulosis, prostate CA, tubular adenoma who presents to the ED via EMS after a recurrent  fall this afternoon. Pts reports he fell at 6pm night PTA and again DOA while he was trying to urinate in the bathroom.  He denies LOC and reports chills, extreme weakness  and fatigue for a few  days, also assoc  bifrontal mild headache, 1-2 loose stool diarrhea and decreased appetite/PO intake. IN ER noted to be septic. subsequently due to his SOB  chest angio performed and revealed B/L PE's,  dopplers performed and revealed + B/L DVT's    19: Pt seen at bedside, OOB to chair in [place] with wife and his 2 daughters. He is found to have acute b/l PE and right posterior tibial DVT and left peroneal DVT. Denies recent long travel, flight, no recent hospitalization or surgery. Denies blood clotting issues in past. Denies prior VTE. Heme/onc evaluated pt for non-metastatic prostate cancer and recommends either DOACs or warfarin. Discussed with pt and family at bedside about transitioning from lovenox to DOAC. Disks risks/benefits of each anticoag DVT treatment, its lifestyle modification, costs. They felt reluctant to change to oral AC since was informed that lovenox is optimal for cancer pts.   19: Pt seen at bedside OOB to chair. Spoke with wife via phone as well. Informed both that copay for lovenox 1 mth supply is $363.98. When asked pt how he tolerated lovenox self injection with nurse yesterday evening and this morning, he reports that he had difficulty. Wife also unable to inject for patient. Then sent eRx for Xarelto 15 mg BID x 21 days copay $32.76 and thereafter Xarelto 20 mg daily 30 tabs copay $47/month. Pt and wife both agree to transition to Xarelto going forward and to follow up with heme/onc once discharged for hypercoag w/u.   : seen OOB to chair, D/W pt s/s of bleeding or further clotting and appropriate measure sto ttake if these should occur, pt to f/u with PMD DR Paulino    Consulted by Shankar De Souza  for VTE prophylaxis, risk stratification, and anticoagulation management.    PAST MEDICAL & SURGICAL HISTORY:  Diverticulosis: sigmoid  Tubular adenoma  BPH (Benign Prostatic Hyperplasia)  History of prostate cancer: seed implant 2004  HTN (hypertension)  Sensorineural hearing loss: bilateral  Rhinitis  Deviated nasal septum  History of atypical nevus  S/P cataract surgery: left eye  Prostate ca: with seed implantation 2004  Mastoid disorder  History of tonsillectomy  History of rhinoplasty    BMI: 29    CrCL: 59.99    IMPROVE VTE Individual Risk Assessment Score:          RISK                                                          Points  [ X ] Previous VTE                                                3  [  ] Thrombophilia                                             2  [  ] Lower limb paralysis                                   2        (unable to hold up >15 seconds)    [  ] Current Cancer                                             2         (within 6 months)  [  ] Immobilization > 24 hrs                              1  [  ] ICU/CCU stay > 24 hours                             1  [ X ] Age > 60                                                         1    IMPROVE VTE Score:         [  4  ]    Total Risk Factor Score:    0 - 1:   Consider IPC  >2 - 3:  Thromboprophylaxis required (enoxaparin or SQ heparin)        >4:   High Risk: Thromboprophylaxis required (enoxaparin or SQ heparin), optional add IPC  **If CONTRAINDICATION to enoxaparin or SQ heparin, USE IPCs**    IMPROVE Bleeding Risk Score: 2.5    Falls Risk:   High ( X )  Mod (  )  Low (  )    FAMILY HISTORY:  Father  at 70 , Multiple Myeloma  Mother  at 91, Dementia (2019 20:26)  Denies any personal or familial history of clotting or bleeding disorders.    Allergies  adhesives (Rash)  No Known Drug Allergies    Intolerances    REVIEW OF SYSTEMS    (  )Fever	     (  )Constipation	( X)SOB			(  )Headache	(  )Dysuria  (  )Chills	     (  )Melena	(  )Dyspnea present on exertion    (  )Dizziness                    (  )Polyuria  (  )Nausea	     (  )Hematochezia	(  )Cough		                    (  )Syncope   	(  )Hematuria  (  )Vomiting    (  )Chest Pain	(  )Wheezing		(  )Weakness  (  )Diarrhea     (  )Palpitations	(  )Anorexia		(  )Myalgia    All other review of systems negative: Yes    PHYSICAL EXAM:    Vital Signs Last 24 Hrs  T(C): 37.1 (19 @ 22:32), Max: 37.1 (19 @ 22:32)  T(F): 98.8 (19 @ 22:32), Max: 98.8 (19 @ 22:32)  HR: 88 (07-02-19 @ 11:00) (70 - 93)  BP: 156/94 (19 @ 11:00) (137/77 - 176/102)  BP(mean): 109 (19 @ 11:00) (94 - 122)  RR: 20 (19 @ 11:00) (17 - 24)  SpO2: 96% (19 @ 11:00) (90% - 96%)    Constitutional: Appears Well    Neurological: A& O x 3    Skin: Warm    Respiratory and Thorax: increased effort; Breath sounds: normal; diminished at bases, slight SANCHEZ noted O2 sat 98%	  Cardiovascular: S1, S2, regular, NMBR	    Gastrointestinal: BS + x 4Q, nontender, distended, and firm	    Genitourinary:  Bladder nondistended, nontender    Musculoskeletal:   General Right:   no muscle/joint tenderness,   normal tone, no joint swelling,   ROM: full	    General Left:   no muscle/joint tenderness,   normal tone, no joint swelling,   ROM: limited    Lower extrems:   Right: no calf tenderness              negative arnie's sign               + pedal pulses    Left:   no calf tenderness              negative arnie's sign               + pedal pulses                            12.7   11.61 )-----------( 333      ( 2019 07:02 )             39.5       07-02    144  |  111<H>  |  21  ----------------------------<  104<H>  3.7   |  24  |  1.05    Ca    9.0      2019 07:02                          12.8   11.69 )-----------( 269      ( 2019 06:48 )             38.0     06-    146<H>  |  114<H>  |  27<H>  ----------------------------<  106<H>  4.1   |  22  |  1.03    Ca    8.8      2019 06:48    RADIOLOGY, EKG & ADDITIONAL TESTS: Reviewed.   QRS axis to [] ° and NSR at a rate of [] BPM. There was no atrial enlargement. There was no ventricular hypertrophy. There were no ST-T changes and all intervals were normal.    < from: CT Angio Chest PE Protocol w/ IV Cont (19 @ 12:42) >  IMPRESSION:  Extensive bilateral pulmonary embolism.  This finding was discussed with  on 2019, shortly after the examination was performed.  Enlarged main pulmonary artery and straightening of the interventricular septum suggestive of right heart strain.  Right upper lobe pulmonary infarct. Probable small left lower lobe pulmonary infarct.  Bilateral indeterminate renal lesions. Recommend MRI to further evaluate.    < from: US Duplex Venous Lower Ext Complete, Bilateral (19 @ 11:07) >  IMPRESSION:   Acute deep venous thrombosis involving the right posterior tibial vein and left peroneal vein.  Acute deep venous thrombosis: below the knee.    DVT Prophylaxis:  LMWH                   (  )  Heparin SQ           (  )  Coumadin             (  )  Xarelto                  (X)  Eliquis                   (  )  Venodynes           ( X )  Ambulation          ( X )  UFH                       (  )  Contraindicated  (  )  ASA                       (   )
HPI: Pt is an 82 y/o male with PMHx of HTN, BPH, diverticulosis, prostate CA, tubular adenoma who presents to the ED via EMS after a recurrent fall this afternoon. Pts reports he fell at 6pm last night and again today at 1pm while he was trying to urinate in the bathroom.  He denies LOC and reports chills, extreme weakness  and fatigue for a few  days, also assoc  bifrontal mild headache, 1-2 loose stool diarrhea and decreased appetite/PO intake. Pt saw Dr. Nik Zimmerman at 10 am on day of presentation, and was prescribed cefuroxime for chest congestion and a sinus infection (06.22.19)      06/28/19: Patient seen and examined. SOB improving. No new complaints. Discussed with patient, wife and daughter at bed side regarding management and d/c plan.         REVIEW OF SYSTEMS:  CONSTITUTIONAL: No fever, No weakness, chills  EYES/ENT: No visual changes;  No vertigo or throat pain   NECK: No pain or stiffness  RESPIRATORY: +SOB, No cough, no wheezing, hemoptysis  CARDIOVASCULAR: No chest pain or palpitations  GASTROINTESTINAL:, No abdominal or epigastric pain. No nausea, vomiting, or hematemesis; No constipation. No melena or hematochezia.  GENITOURINARY: No dysuria, frequency or hematuria  NEUROLOGICAL: No numbness or weakness  SKIN: No itching, burning, rashes, or lesions   All other review of systems is negative unless indicated above    Vital Signs Last 24 Hrs  T(C): 36.8 (28 Jun 2019 14:35), Max: 37.8 (27 Jun 2019 18:29)  T(F): 98.2 (28 Jun 2019 14:35), Max: 100 (27 Jun 2019 18:29)  HR: 89 (28 Jun 2019 15:00) (69 - 110)  BP: 147/73 (28 Jun 2019 13:52) (130/77 - 176/99)  BP(mean): 90 (28 Jun 2019 13:52) (86 - 121)  RR: 21 (28 Jun 2019 15:00) (15 - 33)  SpO2: 95% (28 Jun 2019 15:00) (92% - 98%)        PHYSICAL EXAM:  Constitutional: NAD, awake and alert, well-developed  HEENT: EOMI, Normal Hearing, MMM  Neck: Soft and supple, No LAD, questionable JVD  Respiratory: mild increased respiratory effort, crackles in RUL, + bibasilar crackles  Cardiovascular: S1 and S2, regular rate and rhythm, no Murmurs, gallops or rubs  Gastrointestinal: Bowel Sounds present, soft, nontender, nondistended, no guarding, no rebound  Extremities: No peripheral edema  Vascular: 2+ peripheral pulses  Neurological: A/O x 3, no focal deficits  Musculoskeletal: 5/5 strength b/l upper and lower extremities  Skin: No rashes        Labs:                           12.9   12.16 )-----------( 263      ( 28 Jun 2019 07:02 )             38.2     28 Jun 2019 07:02    146    |  112    |  21     ----------------------------<  99     3.4     |  23     |  0.96     Ca    8.8        28 Jun 2019 07:02  Phos  4.2       27 Jun 2019 06:11    TPro  6.2    /  Alb  2.3    /  TBili  0.4    /  DBili  x      /  AST  71     /  ALT  67     /  AlkPhos  52     27 Jun 2019 06:11    LIVER FUNCTIONS - ( 27 Jun 2019 06:11 )  Alb: 2.3 g/dL / Pro: 6.2 gm/dL / ALK PHOS: 52 U/L / ALT: 67 U/L / AST: 71 U/L / GGT: x             CAPILLARY BLOOD GLUCOSE        CARDIAC MARKERS ( 27 Jun 2019 13:41 )  0.101 ng/mL / x     / x     / x     / x      CARDIAC MARKERS ( 27 Jun 2019 06:11 )  0.155 ng/mL / x     / x     / x     / x      CARDIAC MARKERS ( 26 Jun 2019 22:16 )  0.317 ng/mL / x     / x     / x     / x            MEDICATIONS  (STANDING):  aspirin enteric coated 81 milliGRAM(s) Oral daily  cefepime  Injectable.      cefepime  Injectable. 1000 milliGRAM(s) IV Push every 12 hours  doxazosin 4 milliGRAM(s) Oral at bedtime  enoxaparin Injectable 80 milliGRAM(s) SubCutaneous every 12 hours  furosemide   Injectable 20 milliGRAM(s) IV Push once  metoprolol succinate ER 25 milliGRAM(s) Oral daily  valsartan 160 milliGRAM(s) Oral daily    MEDICATIONS  (PRN):  acetaminophen   Tablet .. 650 milliGRAM(s) Oral every 6 hours PRN Temp greater or equal to 38C (100.4F)  hydrALAZINE Injectable 5 milliGRAM(s) IV Push every 1 hour PRN SBP > 180  simethicone 80 milliGRAM(s) Chew three times a day PRN Gas
HPI: Pt is an 82 y/o male with PMHx of HTN, BPH, diverticulosis, prostate CA, tubular adenoma who presents to the ED via EMS after a recurrent fall this afternoon. Pts reports he fell at 6pm last night and again today at 1pm while he was trying to urinate in the bathroom.  He denies LOC and reports chills, extreme weakness  and fatigue for a few  days, also assoc  bifrontal mild headache, 1-2 loose stool diarrhea and decreased appetite/PO intake. Pt saw Dr. Nik Zimmerman at 10 am on day of presentation, and was prescribed cefuroxime for chest congestion and a sinus infection (06.22.19)      06/28/19: Patient seen and examined. SOB improving. No new complaints. Discussed with patient, wife and daughter at bed side regarding management and d/c plan.   06/29/19; Patient seen and examined. Sitting in a chair, he denies any sob or chest pain.   06/30/19: Patient seen and examined. He denies any sob or chest pain. BP was elevated in am, better now.         REVIEW OF SYSTEMS:  CONSTITUTIONAL: No fever, No weakness, chills  EYES/ENT: No visual changes;  No vertigo or throat pain   NECK: No pain or stiffness  RESPIRATORY: +SOB, No cough, no wheezing, hemoptysis  CARDIOVASCULAR: No chest pain or palpitations  GASTROINTESTINAL:, No abdominal or epigastric pain. No nausea, vomiting, or hematemesis; No constipation. No melena or hematochezia.  GENITOURINARY: No dysuria, frequency or hematuria  NEUROLOGICAL: No numbness or weakness  SKIN: No itching, burning, rashes, or lesions   All other review of systems is negative unless indicated above    Vital Signs Last 24 Hrs  T(C): 36.8 (30 Jun 2019 09:40), Max: 37.1 (29 Jun 2019 21:12)  T(F): 98.3 (30 Jun 2019 09:40), Max: 98.7 (29 Jun 2019 21:12)  HR: 97 (30 Jun 2019 10:00) (72 - 97)  BP: 148/78 (30 Jun 2019 10:00) (132/80 - 196/104)  BP(mean): 99 (30 Jun 2019 10:00) (94 - 132)  RR: 18 (30 Jun 2019 10:00) (13 - 27)  SpO2: 98% (30 Jun 2019 10:00) (92% - 100%)        PHYSICAL EXAM:    Constitutional: NAD, awake and alert, well-developed  HEENT: EOMI, Normal Hearing, MMM  Neck: Soft and supple, No LAD, questionable JVD  Respiratory: mild increased respiratory effort, crackles in RUL, + bibasilar crackles  Cardiovascular: S1 and S2, regular rate and rhythm, no Murmurs, gallops or rubs  Gastrointestinal: Bowel Sounds present, soft, nontender, nondistended, no guarding, no rebound  Extremities: No peripheral edema  Vascular: 2+ peripheral pulses  Neurological: A/O x 3, no focal deficits  Musculoskeletal: 5/5 strength b/l upper and lower extremities  Skin: No rashes        Labs:                                       12.8   11.69 )-----------( 269      ( 29 Jun 2019 06:48 )             38.0     29 Jun 2019 06:48    146    |  114    |  27     ----------------------------<  106    4.1     |  22     |  1.03     Ca    8.8        29 Jun 2019 06:48          MEDICATIONS:    aspirin enteric coated 81 milliGRAM(s) Oral daily  cefepime  Injectable.      cefepime  Injectable. 1000 milliGRAM(s) IV Push every 12 hours  doxazosin 4 milliGRAM(s) Oral at bedtime  enoxaparin Injectable 80 milliGRAM(s) SubCutaneous every 12 hours  furosemide   Injectable 20 milliGRAM(s) IV Push once  metoprolol succinate ER 25 milliGRAM(s) Oral daily  valsartan 160 milliGRAM(s) Oral daily    MEDICATIONS  (PRN):  acetaminophen   Tablet .. 650 milliGRAM(s) Oral every 6 hours PRN Temp greater or equal to 38C (100.4F)  hydrALAZINE Injectable 5 milliGRAM(s) IV Push every 1 hour PRN SBP > 180  simethicone 80 milliGRAM(s) Chew three times a day PRN Gas
HPI: Pt is an 82 y/o male with PMHx of HTN, BPH, diverticulosis, prostate CA, tubular adenoma who presents to the ED via EMS after a recurrent fall this afternoon. Pts reports he fell at 6pm last night and again today at 1pm while he was trying to urinate in the bathroom.  He denies LOC and reports chills, extreme weakness  and fatigue for a few  days, also assoc  bifrontal mild headache, 1-2 loose stool diarrhea and decreased appetite/PO intake. Pt saw Dr. Nik Zimmerman at 10 am on day of presentation, and was prescribed cefuroxime for chest congestion and a sinus infection (06.22.19)      06/28/19: Patient seen and examined. SOB improving. No new complaints. Discussed with patient, wife and daughter at bed side regarding management and d/c plan.   06/29/19; Patient seen and examined. Sitting in a chair, he denies any sob or chest pain.   06/30/19: Patient seen and examined. He denies any sob or chest pain. BP was elevated in am, better now.   07/01/19; Patient seen and examined. No sob or chest pain. Discussed with 2 daughters, wife and patient regarding management and d/c plan.         All other review of systems is negative unless indicated above    Vital Signs Last 24 Hrs  T(C): 37.1 (01 Jul 2019 06:51), Max: 37.5 (30 Jun 2019 16:35)  T(F): 98.7 (01 Jul 2019 06:51), Max: 99.5 (30 Jun 2019 16:35)  HR: 91 (01 Jul 2019 13:00) (68 - 93)  BP: 137/77 (01 Jul 2019 13:00) (135/81 - 172/93)  BP(mean): 94 (01 Jul 2019 13:00) (94 - 115)  RR: 18 (01 Jul 2019 13:00) (17 - 24)  SpO2: 95% (01 Jul 2019 13:00) (91% - 99%)      PHYSICAL EXAM:    Constitutional: NAD, awake and alert, well-developed  HEENT: EOMI, Normal Hearing, MMM  Neck: Soft and supple, No LAD, questionable JVD  Respiratory: mild increased respiratory effort, crackles in RUL, + bibasilar crackles  Cardiovascular: S1 and S2, regular rate and rhythm, no Murmurs, gallops or rubs  Gastrointestinal: Bowel Sounds present, soft, nontender, nondistended, no guarding, no rebound  Extremities: No peripheral edema  Vascular: 2+ peripheral pulses  Neurological: A/O x 3, no focal deficits  Musculoskeletal: 5/5 strength b/l upper and lower extremities  Skin: No rashes        Labs:                      MEDICATIONS:    MEDICATIONS  (STANDING):  aspirin enteric coated 81 milliGRAM(s) Oral daily  doxazosin 4 milliGRAM(s) Oral at bedtime  furosemide   Injectable 20 milliGRAM(s) IV Push once  lidocaine   Patch 1 Patch Transdermal daily  metoprolol succinate ER 25 milliGRAM(s) Oral every 12 hours  rivaroxaban 15 milliGRAM(s) Oral two times a day with meals  valsartan 320 milliGRAM(s) Oral daily    MEDICATIONS  (PRN):  acetaminophen   Tablet .. 650 milliGRAM(s) Oral every 6 hours PRN Temp greater or equal to 38C (100.4F)  acetaminophen   Tablet .. 650 milliGRAM(s) Oral every 6 hours PRN Moderate Pain (4 - 6)  ALBUTerol/ipratropium for Nebulization 3 milliLiter(s) Nebulizer every 6 hours PRN Wheezing  hydrALAZINE Injectable 5 milliGRAM(s) IV Push every 1 hour PRN SBP > 180  simethicone 80 milliGRAM(s) Chew three times a day PRN Gas
HPI: Pt is an 82 y/o male with PMHx of HTN, BPH, diverticulosis, prostate CA, tubular adenoma who presents to the ED via EMS after a recurrent fall this afternoon. Pts reports he fell at 6pm last night and again today at 1pm while he was trying to urinate in the bathroom.  He denies LOC and reports chills, extreme weakness  and fatigue for a few  days, also assoc  bifrontal mild headache, 1-2 loose stool diarrhea and decreased appetite/PO intake. Pt saw Dr. Nik Zimmerman at 10 am on day of presentation, and was prescribed cefuroxime for chest congestion and a sinus infection (06.22.19)    SUBJECTIVE: Pt reports he is having more difficulty breathing. He went up to the restroom earlier and desatted to 80s on NC. Pt then sat back down and saturation improved to 90s on NC.  Pt denies chest pain, cough. He doesn't know if he gets orthopnea. Pt does report that over the last few weeks, he has had increasing weakness and SANCHEZ.     REVIEW OF SYSTEMS:  CONSTITUTIONAL: +fever, No weakness, chills  EYES/ENT: No visual changes;  No vertigo or throat pain   NECK: No pain or stiffness  RESPIRATORY: +SOB, No cough, no wheezing, hemoptysis  CARDIOVASCULAR: No chest pain or palpitations  GASTROINTESTINAL: +loose stool no increased freq, No abdominal or epigastric pain. No nausea, vomiting, or hematemesis; No constipation. No melena or hematochezia.  GENITOURINARY: No dysuria, frequency or hematuria  NEUROLOGICAL: No numbness or weakness  SKIN: No itching, burning, rashes, or lesions   All other review of systems is negative unless indicated above    Vital Signs Last 24 Hrs  T(C): 36.9 (25 Jun 2019 14:26), Max: 38.5 (24 Jun 2019 16:26)  T(F): 98.5 (25 Jun 2019 14:26), Max: 101.3 (24 Jun 2019 16:26)  HR: 86 (25 Jun 2019 14:26) (72 - 98)  BP: 128/68 (25 Jun 2019 14:26) (128/68 - 186/94)  RR: 21 (25 Jun 2019 14:26) (20 - 30)  SpO2: 95% (25 Jun 2019 14:26) (94% - 98%)    I&O's Summary    24 Jun 2019 07:01  -  25 Jun 2019 07:00  --------------------------------------------------------  IN: 0 mL / OUT: 560 mL / NET: -560 mL    25 Jun 2019 07:01  -  25 Jun 2019 15:07  --------------------------------------------------------  IN: 0 mL / OUT: 825 mL / NET: -825 mL        CAPILLARY BLOOD GLUCOSE          PHYSICAL EXAM:  Constitutional: NAD, awake and alert, well-developed  HEENT: EOMI, Normal Hearing, MMM  Neck: Soft and supple, No LAD, questionable JVD  Respiratory: nl respiratory effort, crackles in RUL and bibasilar   Cardiovascular: S1 and S2, regular rate and rhythm, no Murmurs, gallops or rubs  Gastrointestinal: Bowel Sounds present, soft, nontender, nondistended, no guarding, no rebound  Extremities: No peripheral edema  Vascular: 2+ peripheral pulses  Neurological: A/O x 3, no focal deficits  Musculoskeletal: 5/5 strength b/l upper and lower extremities  Skin: No rashes      MEDICATIONS:  MEDICATIONS  (STANDING):  aspirin enteric coated 81 milliGRAM(s) Oral daily  azithromycin  IVPB 500 milliGRAM(s) IV Intermittent every 24 hours  azithromycin  IVPB      cefTRIAXone Injectable. 1000 milliGRAM(s) IV Push every 24 hours  doxazosin 4 milliGRAM(s) Oral at bedtime  heparin  Injectable 5000 Unit(s) SubCutaneous every 8 hours  metoprolol succinate ER 25 milliGRAM(s) Oral daily  sodium chloride 0.9%. 1000 milliLiter(s) (50 mL/Hr) IV Continuous <Continuous>  valsartan 160 milliGRAM(s) Oral daily      LABS: All Labs Reviewed:                        13.8   8.13  )-----------( 177      ( 24 Jun 2019 08:34 )             41.3     06-25    143  |  111<H>  |  18  ----------------------------<  136<H>  3.2<L>   |  24  |  1.06    Ca    8.8      25 Jun 2019 08:35    TPro  7.3  /  Alb  2.7<L>  /  TBili  0.6  /  DBili  x   /  AST  47<H>  /  ALT  24  /  AlkPhos  52  06-24      Culture - Stool (06.23.19 @ 21:00)    Specimen Source: .Stool Feces    Culture Results:   No enteric pathogens to date: Final culture pending    Culture - Blood (06.22.19 @ 14:55)    Specimen Source: .Blood Blood-Peripheral    Culture Results:   No growth to date.    Culture - Urine (06.22.19 @ 14:45)    Specimen Source: .Urine Clean Catch (Midstream)    Culture Results:   No growth    Legionella pneumophila Antigen, Urine (06.24.19 @ 02:45)    Legionella Antigen, Urine: Negative
HPI: Pt is an 84 y/o male with PMHx of HTN, BPH, diverticulosis, prostate CA, tubular adenoma who presents to the ED via EMS after a recurrent  fall this afternoon. Pts reports he fell at 6pm last night and again today at 1pm while he was trying to urinate in the bathroom.  He denies LOC and reports chills, extreme weakness  and fatigue for a few  days, also assoc  bifrontal mild headache, 1-2 loose stool diarrhea and decreased appetite/PO intake. Pt saw Dr. Nik Zimmerman at 10 am this morning ,  and was prescribed cefuroxime for  chest congestion and  a sinus infection. In the ED, pt was noted to be febrile with a rectal temp 103F.  He  denied neck pain, back pain, bilateral lower extremities pain. He reports 1 week of a non-productive cough.  No SOB or chest pain.  No hx of renal problems.  On 81mg every other day. In the ED, he received Sepsis antibiotics with 2gm Cefepime and Vanco 1gm,  and 2400 ml of LR, 650mg tylenol.    19: Pt seen at bedside, OOB to chair in [place] with wife and his 2 daughters. He is found to have acute b/l PE and right posterior tibial DVT and left peroneal DVT. Denies recent long travel, flight, no recent hospitalization or surgery. Denies blood clotting issues in past. Denies prior VTE. Heme/onc evaluated pt for non-metastatic prostate cancer and recommends either DOACs or warfarin. Discussed with pt and family at bedside about transitioning from lovenox to DOAC. Disks risks/benefits of each anticoag DVT treatment, its lifestyle modification, costs. They felt reluctant to change to oral AC since was informed that lovenox is optimal for cancer pts.   19: Pt seen at bedside OOB to chair. Spoke with wife via phone as well. Informed both that copay for lovenox 1 mth supply is $363.98. When asked pt how he tolerated lovenox self injection with nurse yesterday evening and this morning, he reports that he had difficulty. Wife also unable to inject for patient. Then sent eRx for Xarelto 15 mg BID x 21 days copay $32.76 and thereafter Xarelto 20 mg daily 30 tabs copay $47/month. Pt and wife both agree to transition to Xarelto going forward and to follow up with heme/onc once discharged for hypercoag w/u.     Consulted by Shankar De Souza  for VTE prophylaxis, risk stratification, and anticoagulation management.    PAST MEDICAL & SURGICAL HISTORY:  Diverticulosis: sigmoid  Tubular adenoma  BPH (Benign Prostatic Hyperplasia)  History of prostate cancer: seed implant 2004  HTN (hypertension)  Sensorineural hearing loss: bilateral  Rhinitis  Deviated nasal septum  History of atypical nevus  S/P cataract surgery: left eye  Prostate ca: with seed implantation 2004  Mastoid disorder  History of tonsillectomy  History of rhinoplasty    BMI: 29    CrCL: 59.99    IMPROVE VTE Individual Risk Assessment Score:          RISK                                                          Points  [ X ] Previous VTE                                                3  [  ] Thrombophilia                                             2  [  ] Lower limb paralysis                                   2        (unable to hold up >15 seconds)    [  ] Current Cancer                                             2         (within 6 months)  [  ] Immobilization > 24 hrs                              1  [  ] ICU/CCU stay > 24 hours                             1  [ X ] Age > 60                                                         1    IMPROVE VTE Score:         [  4  ]    Total Risk Factor Score:    0 - 1:   Consider IPC  >2 - 3:  Thromboprophylaxis required (enoxaparin or SQ heparin)        >4:   High Risk: Thromboprophylaxis required (enoxaparin or SQ heparin), optional add IPC  **If CONTRAINDICATION to enoxaparin or SQ heparin, USE IPCs**    IMPROVE Bleeding Risk Score: 2.5    Falls Risk:   High ( X )  Mod (  )  Low (  )    FAMILY HISTORY:  Father  at 70 , Multiple Myeloma  Mother  at 91, Dementia (2019 20:26)  Denies any personal or familial history of clotting or bleeding disorders.    Allergies  adhesives (Rash)  No Known Drug Allergies    Intolerances    REVIEW OF SYSTEMS    (  )Fever	     (  )Constipation	( X)SOB			(  )Headache	(  )Dysuria  (  )Chills	     (  )Melena	(  )Dyspnea present on exertion    (  )Dizziness                    (  )Polyuria  (  )Nausea	     (  )Hematochezia	(  )Cough		                    (  )Syncope   	(  )Hematuria  (  )Vomiting    (  )Chest Pain	(  )Wheezing		(  )Weakness  (  )Diarrhea     (  )Palpitations	(  )Anorexia		(  )Myalgia    All other review of systems negative: Yes    PHYSICAL EXAM:    Vital Signs Last 24 Hrs  T(C): 36.8 (19 @ 09:40), Max: 37.1 (19 @ 21:12)  T(F): 98.3 (19 @ 09:40), Max: 98.7 (19 @ 21:12)  HR: 97 (19 @ 10:00) (72 - 97)  BP: 148/78 (19 @ 10:00) (132/80 - 196/104)  BP(mean): 99 (19 @ 10:00) (94 - 132)  RR: 18 (19 @ 10:00) (13 - 27)  SpO2: 98% (19 @ 10:00) (92% - 100%)    Constitutional: Appears Well    Neurological: A& O x 3    Skin: Warm    Respiratory and Thorax: increased effort; Breath sounds: normal; No rales/wheezing/rhonchi  	  Cardiovascular: S1, S2, regular, NMBR	    Gastrointestinal: BS + x 4Q, nontender, distended, and firm	    Genitourinary:  Bladder nondistended, nontender    Musculoskeletal:   General Right:   no muscle/joint tenderness,   normal tone, no joint swelling,   ROM: full	    General Left:   no muscle/joint tenderness,   normal tone, no joint swelling,   ROM: limited    Lower extrems:   Right: no calf tenderness              negative arnie's sign               + pedal pulses    Left:   no calf tenderness              negative arnie's sign               + pedal pulses    MEDICATIONS  (STANDING):  aspirin enteric coated 81 milliGRAM(s) Oral daily  cefepime  Injectable.      cefepime  Injectable. 1000 milliGRAM(s) IV Push every 12 hours  doxazosin 4 milliGRAM(s) Oral at bedtime  furosemide   Injectable 20 milliGRAM(s) IV Push once  lidocaine   Patch 1 Patch Transdermal daily  metoprolol succinate ER 25 milliGRAM(s) Oral every 12 hours  rivaroxaban 15 milliGRAM(s) Oral two times a day with meals  valsartan 320 milliGRAM(s) Oral daily                            12.8   11.69 )-----------( 269      ( 2019 06:48 )             38.0     -    146<H>  |  114<H>  |  27<H>  ----------------------------<  106<H>  4.1   |  22  |  1.03    Ca    8.8      2019 06:48    RADIOLOGY, EKG & ADDITIONAL TESTS: Reviewed.   QRS axis to [] ° and NSR at a rate of [] BPM. There was no atrial enlargement. There was no ventricular hypertrophy. There were no ST-T changes and all intervals were normal.    < from: CT Angio Chest PE Protocol w/ IV Cont (19 @ 12:42) >  IMPRESSION:  Extensive bilateral pulmonary embolism.  This finding was discussed with  on 2019, shortly after the examination was performed.  Enlarged main pulmonary artery and straightening of the interventricular septum suggestive of right heart strain.  Right upper lobe pulmonary infarct. Probable small left lower lobe pulmonary infarct.  Bilateral indeterminate renal lesions. Recommend MRI to further evaluate.    < from: US Duplex Venous Lower Ext Complete, Bilateral (19 @ 11:07) >  IMPRESSION:   Acute deep venous thrombosis involving the right posterior tibial vein and left peroneal vein.  Acute deep venous thrombosis: below the knee.    DVT Prophylaxis:  LMWH                   (  )  Heparin SQ           (  )  Coumadin             (  )  Xarelto                  (X)  Eliquis                   (  )  Venodynes           ( X )  Ambulation          ( X )  UFH                       (  )  Contraindicated  (  )  ASA                       (   )
HPI: Pt is an 84 y/o male with PMHx of HTN, BPH, diverticulosis, prostate CA, tubular adenoma who presents to the ED via EMS after a recurrent  fall this afternoon. Pts reports he fell at 6pm night PTA and again DOA while he was trying to urinate in the bathroom.  He denies LOC and reports chills, extreme weakness  and fatigue for a few  days, also assoc  bifrontal mild headache, 1-2 loose stool diarrhea and decreased appetite/PO intake. IN ER noted to be septic. subsequently due to his SOB  chest angio performed and revealed B/L PE's,  dopplers performed and revealed + B/L DVT's    19: Pt seen at bedside, [place]OB to chair in [place] with wife and his 2 daughters. He is found to have acute b/l PE and right posterior tibial DVT and left peroneal DVT. Denies recent long travel, flight, no recent hospitalization or surgery. Denies blood clotting issues in past. Denies prior VTE. Heme/onc evaluated pt for non-metastatic prostate cancer and recommends either DOACs or warfarin. Discussed with pt and family at bedside about transitioning from lovenox to DOAC. Disks risks/benefits of each anticoag DVT treatment, its lifestyle modification, costs. They felt reluctant to change to oral AC since was informed that lovenox is optimal for cancer pts.   19: Pt seen at bedside OOB to chair. Spoke with wife via phone as well. Informed both that copay for lovenox 1 mth supply is $363.98. When asked pt how he tolerated lovenox self injection with nurse yesterday evening and this morning, he reports that he had difficulty. Wife also unable to inject for patient. Then sent eRx for Xarelto 15 mg BID x 21 days copay $32.76 and thereafter Xarelto 20 mg daily 30 tabs copay $47/month. Pt and wife both agree to transition to Xarelto going forward and to follow up with heme/onc once discharged for hypercoag w/u.   : seen OOB to chair, D/W pt s/s of bleeding or further clotting and appropriate measure sto ttake if these should occur, pt to f/u with PMD DR Paulino    Consulted by Shankar De Souza  for VTE prophylaxis, risk stratification, and anticoagulation management.    PAST MEDICAL & SURGICAL HISTORY:  Diverticulosis: sigmoid  Tubular adenoma  BPH (Benign Prostatic Hyperplasia)  History of prostate cancer: seed implant 2004  HTN (hypertension)  Sensorineural hearing loss: bilateral  Rhinitis  Deviated nasal septum  History of atypical nevus  S/P cataract surgery: left eye  Prostate ca: with seed implantation 2004  Mastoid disorder  History of tonsillectomy  History of rhinoplasty    BMI: 29    CrCL: 59.99    IMPROVE VTE Individual Risk Assessment Score:          RISK                                                          Points  [ X ] Previous VTE                                                3  [  ] Thrombophilia                                             2  [  ] Lower limb paralysis                                   2        (unable to hold up >15 seconds)    [  ] Current Cancer                                             2         (within 6 months)  [  ] Immobilization > 24 hrs                              1  [  ] ICU/CCU stay > 24 hours                             1  [ X ] Age > 60                                                         1    IMPROVE VTE Score:         [  4  ]    Total Risk Factor Score:    0 - 1:   Consider IPC  >2 - 3:  Thromboprophylaxis required (enoxaparin or SQ heparin)        >4:   High Risk: Thromboprophylaxis required (enoxaparin or SQ heparin), optional add IPC  **If CONTRAINDICATION to enoxaparin or SQ heparin, USE IPCs**    IMPROVE Bleeding Risk Score: 2.5    Falls Risk:   High ( X )  Mod (  )  Low (  )    FAMILY HISTORY:  Father  at 70 , Multiple Myeloma  Mother  at 91, Dementia (2019 20:26)  Denies any personal or familial history of clotting or bleeding disorders.    Allergies  adhesives (Rash)  No Known Drug Allergies    Intolerances    REVIEW OF SYSTEMS    (  )Fever	     (  )Constipation	( X)SOB			(  )Headache	(  )Dysuria  (  )Chills	     (  )Melena	(  )Dyspnea present on exertion    (  )Dizziness                    (  )Polyuria  (  )Nausea	     (  )Hematochezia	(  )Cough		                    (  )Syncope   	(  )Hematuria  (  )Vomiting    (  )Chest Pain	(  )Wheezing		(  )Weakness  (  )Diarrhea     (  )Palpitations	(  )Anorexia		(  )Myalgia    All other review of systems negative: Yes    PHYSICAL EXAM:    Vital Signs Last 24 Hrs  T(C): 36.8 (19 @ 14:37), Max: 37.5 (19 @ 16:35)  T(F): 98.3 (19 @ 14:37), Max: 99.5 (19 @ 16:35)  HR: 91 (07-01-19 @ 13:00) (68 - 93)  BP: 137/77 (19 @ 13:00) (135/81 - 172/93)  BP(mean): 94 (19 @ 13:00) (94 - 115)  RR: 18 (19 @ 13:00) (17 - 24)  SpO2: 95% (19 @ 13:00) (91% - 99%)    Constitutional: Appears Well    Neurological: A& O x 3    Skin: Warm    Respiratory and Thorax: increased effort; Breath sounds: normal; diminished at bases, slight SANCHEZ noted O2 sat 98%	  Cardiovascular: S1, S2, regular, NMBR	    Gastrointestinal: BS + x 4Q, nontender, distended, and firm	    Genitourinary:  Bladder nondistended, nontender    Musculoskeletal:   General Right:   no muscle/joint tenderness,   normal tone, no joint swelling,   ROM: full	    General Left:   no muscle/joint tenderness,   normal tone, no joint swelling,   ROM: limited    Lower extrems:   Right: no calf tenderness              negative arnie's sign               + pedal pulses    Left:   no calf tenderness              negative arnie's sign               + pedal pulses    MEDICATIONS  (STANDING):  aspirin enteric coated 81 milliGRAM(s) Oral daily  cefepime  Injectable.      cefepime  Injectable. 1000 milliGRAM(s) IV Push every 12 hours  doxazosin 4 milliGRAM(s) Oral at bedtime  furosemide   Injectable 20 milliGRAM(s) IV Push once  lidocaine   Patch 1 Patch Transdermal daily  metoprolol succinate ER 25 milliGRAM(s) Oral every 12 hours  rivaroxaban 15 milliGRAM(s) Oral two times a day with meals  valsartan 320 milliGRAM(s) Oral daily                                    12.8   11.69 )-----------( 269      ( 2019 06:48 )             38.0     06-    146<H>  |  114<H>  |  27<H>  ----------------------------<  106<H>  4.1   |  22  |  1.03    Ca    8.8      2019 06:48    RADIOLOGY, EKG & ADDITIONAL TESTS: Reviewed.   QRS axis to [] ° and NSR at a rate of [] BPM. There was no atrial enlargement. There was no ventricular hypertrophy. There were no ST-T changes and all intervals were normal.    < from: CT Angio Chest PE Protocol w/ IV Cont (19 @ 12:42) >  IMPRESSION:  Extensive bilateral pulmonary embolism.  This finding was discussed with  on 2019, shortly after the examination was performed.  Enlarged main pulmonary artery and straightening of the interventricular septum suggestive of right heart strain.  Right upper lobe pulmonary infarct. Probable small left lower lobe pulmonary infarct.  Bilateral indeterminate renal lesions. Recommend MRI to further evaluate.    < from: US Duplex Venous Lower Ext Complete, Bilateral (19 @ 11:07) >  IMPRESSION:   Acute deep venous thrombosis involving the right posterior tibial vein and left peroneal vein.  Acute deep venous thrombosis: below the knee.    DVT Prophylaxis:  LMWH                   (  )  Heparin SQ           (  )  Coumadin             (  )  Xarelto                  (X)  Eliquis                   (  )  Venodynes           ( X )  Ambulation          ( X )  UFH                       (  )  Contraindicated  (  )  ASA                       (   )
HPI: Pt is an 84 y/o male with PMHx of HTN, BPH, diverticulosis, prostate CA, tubular adenoma who presents to the ED via EMS after a recurrent fall this afternoon. Pts reports he fell at 6pm last night and again today at 1pm while he was trying to urinate in the bathroom.  He denies LOC and reports chills, extreme weakness  and fatigue for a few  days, also assoc  bifrontal mild headache, 1-2 loose stool diarrhea and decreased appetite/PO intake. Pt saw Dr. Nik Zimmerman at 10 am on day of presentation, and was prescribed cefuroxime for chest congestion and a sinus infection (06.22.19)      06/28/19: Patient seen and examined. SOB improving. No new complaints. Discussed with patient, wife and daughter at bed side regarding management and d/c plan.   06/29/19; Patient seen and examined. Sitting in a chair, he denies any sob or chest pain.         REVIEW OF SYSTEMS:  CONSTITUTIONAL: No fever, No weakness, chills  EYES/ENT: No visual changes;  No vertigo or throat pain   NECK: No pain or stiffness  RESPIRATORY: +SOB, No cough, no wheezing, hemoptysis  CARDIOVASCULAR: No chest pain or palpitations  GASTROINTESTINAL:, No abdominal or epigastric pain. No nausea, vomiting, or hematemesis; No constipation. No melena or hematochezia.  GENITOURINARY: No dysuria, frequency or hematuria  NEUROLOGICAL: No numbness or weakness  SKIN: No itching, burning, rashes, or lesions   All other review of systems is negative unless indicated above    Vital Signs Last 24 Hrs  T(C): 36.7 (29 Jun 2019 12:36), Max: 37.4 (28 Jun 2019 21:47)  T(F): 98 (29 Jun 2019 12:36), Max: 99.3 (28 Jun 2019 21:47)  HR: 76 (29 Jun 2019 12:00) (63 - 89)  BP: 171/102 (29 Jun 2019 12:00) (153/85 - 186/104)  BP(mean): 121 (29 Jun 2019 12:00) (104 - 125)  RR: 13 (29 Jun 2019 12:00) (13 - 30)  SpO2: 98% (29 Jun 2019 12:00) (92% - 98%)        PHYSICAL EXAM:    Constitutional: NAD, awake and alert, well-developed  HEENT: EOMI, Normal Hearing, MMM  Neck: Soft and supple, No LAD, questionable JVD  Respiratory: mild increased respiratory effort, crackles in RUL, + bibasilar crackles  Cardiovascular: S1 and S2, regular rate and rhythm, no Murmurs, gallops or rubs  Gastrointestinal: Bowel Sounds present, soft, nontender, nondistended, no guarding, no rebound  Extremities: No peripheral edema  Vascular: 2+ peripheral pulses  Neurological: A/O x 3, no focal deficits  Musculoskeletal: 5/5 strength b/l upper and lower extremities  Skin: No rashes        Labs:                                             12.8   11.69 )-----------( 269      ( 29 Jun 2019 06:48 )             38.0     29 Jun 2019 06:48    146    |  114    |  27     ----------------------------<  106    4.1     |  22     |  1.03     Ca    8.8        29 Jun 2019 06:48          MEDICATIONS  (STANDING):  aspirin enteric coated 81 milliGRAM(s) Oral daily  cefepime  Injectable.      cefepime  Injectable. 1000 milliGRAM(s) IV Push every 12 hours  doxazosin 4 milliGRAM(s) Oral at bedtime  enoxaparin Injectable 80 milliGRAM(s) SubCutaneous every 12 hours  furosemide   Injectable 20 milliGRAM(s) IV Push once  metoprolol succinate ER 25 milliGRAM(s) Oral daily  valsartan 160 milliGRAM(s) Oral daily    MEDICATIONS  (PRN):  acetaminophen   Tablet .. 650 milliGRAM(s) Oral every 6 hours PRN Temp greater or equal to 38C (100.4F)  hydrALAZINE Injectable 5 milliGRAM(s) IV Push every 1 hour PRN SBP > 180  simethicone 80 milliGRAM(s) Chew three times a day PRN Gas
HPI: Pt is an 84 y/o male with PMHx of HTN, BPH, diverticulosis, prostate CA, tubular adenoma who presents to the ED via EMS after a recurrent fall this afternoon. Pts reports he fell at 6pm last night and again today at 1pm while he was trying to urinate in the bathroom.  He denies LOC and reports chills, extreme weakness  and fatigue for a few  days, also assoc  bifrontal mild headache, 1-2 loose stool diarrhea and decreased appetite/PO intake. Pt saw Dr. Nik Zimmerman at 10 am on day of presentation, and was prescribed cefuroxime for chest congestion and a sinus infection (06.22.19)    SUBJECTIVE: Pt reported feeling well in the morning, but later in the day pt had an episode of syncope while walking with PT (see Dr. Gutierres's chart note). Pt found to have a severe PE. Discussed results and anticoagulation with pt and family.     REVIEW OF SYSTEMS:  CONSTITUTIONAL: +fever, No weakness, chills  EYES/ENT: No visual changes;  No vertigo or throat pain   NECK: No pain or stiffness  RESPIRATORY: +SOB, No cough, no wheezing, hemoptysis  CARDIOVASCULAR: No chest pain or palpitations  GASTROINTESTINAL: +loose stool no increased freq, No abdominal or epigastric pain. No nausea, vomiting, or hematemesis; No constipation. No melena or hematochezia.  GENITOURINARY: No dysuria, frequency or hematuria  NEUROLOGICAL: No numbness or weakness  SKIN: No itching, burning, rashes, or lesions   All other review of systems is negative unless indicated above      Vital Signs Last 24 Hrs  T(C): 36.7 (26 Jun 2019 15:00), Max: 37.4 (25 Jun 2019 17:01)  T(F): 98.1 (26 Jun 2019 15:00), Max: 99.4 (26 Jun 2019 05:28)  HR: 83 (26 Jun 2019 16:00) (68 - 108)  BP: 132/85 (26 Jun 2019 16:00) (110/51 - 177/103)  BP(mean): 95 (26 Jun 2019 16:00) (95 - 101)  RR: 25 (26 Jun 2019 16:00) (18 - 31)  SpO2: 93% (26 Jun 2019 16:00) (89% - 98%)    I&O's Summary    25 Jun 2019 07:01  -  26 Jun 2019 07:00  --------------------------------------------------------  IN: 240 mL / OUT: 1125 mL / NET: -885 mL        CAPILLARY BLOOD GLUCOSE      POCT Blood Glucose.: 168 mg/dL (26 Jun 2019 09:36)      PHYSICAL EXAM:  Constitutional: NAD, awake and alert, well-developed  HEENT: EOMI, Normal Hearing, MMM  Neck: Soft and supple, No LAD, questionable JVD  Respiratory: nl respiratory effort, crackles in RUL and improvement in bibasilar crackles  Cardiovascular: S1 and S2, regular rate and rhythm, no Murmurs, gallops or rubs  Gastrointestinal: Bowel Sounds present, soft, nontender, nondistended, no guarding, no rebound  Extremities: No peripheral edema  Vascular: 2+ peripheral pulses  Neurological: A/O x 3, no focal deficits  Musculoskeletal: 5/5 strength b/l upper and lower extremities  Skin: No rashes      MEDICATIONS:  MEDICATIONS  (STANDING):  aspirin enteric coated 81 milliGRAM(s) Oral daily  azithromycin  IVPB 500 milliGRAM(s) IV Intermittent every 24 hours  azithromycin  IVPB      cefTRIAXone Injectable. 1000 milliGRAM(s) IV Push every 24 hours  doxazosin 4 milliGRAM(s) Oral at bedtime  enoxaparin Injectable 80 milliGRAM(s) SubCutaneous two times a day  furosemide   Injectable 20 milliGRAM(s) IV Push once  metoprolol succinate ER 25 milliGRAM(s) Oral daily  sodium chloride 0.45%. 1000 milliLiter(s) (75 mL/Hr) IV Continuous <Continuous>  sodium chloride 0.9%. 1000 milliLiter(s) (50 mL/Hr) IV Continuous <Continuous>  valsartan 160 milliGRAM(s) Oral daily      LABS: All Labs Reviewed:    06-26    144  |  110<H>  |  18  ----------------------------<  101<H>  3.5   |  27  |  1.00    Ca    8.9      26 Jun 2019 08:14  Mg     2.3     06-26        CARDIAC MARKERS ( 25 Jun 2019 19:32 )  0.069 ng/mL / x     / x     / x     / x      CARDIAC MARKERS ( 25 Jun 2019 16:51 )  0.088 ng/mL / x     / x     / x     / x          < from: CT Angio Chest PE Protocol w/ IV Cont (06.26.19 @ 12:42) >  FINDINGS:    LOWER NECK: Within normal limits.  AXILLA, MEDIASTINUM AND VALERIY: No lymphadenopathy.  VESSELS: Pulmonary embolism involving the descending left pulmonary   artery, right main and interlobar pulmonary arteries, and multiple   segmental and subsegmental branches of all lobes.  Enlarged main   pulmonary artery measuring 3.7 cm. Atherosclerotic arterial   calcifications, including the coronary arteries.  Normal caliber aorta.  HEART: The heart is normal in size.No pericardial effusion.  PLEURA: Trace bilateral pleural effusions.   LUNGS AND LARGE AIRWAYS: Mixed groundglass and consolidative peripheral   opacity in the right upper lobe compatible with infarction. Small   groundglass opacity in the posterior left lower lobe.No suspicious   pulmonary nodule or mass. Benign bilateral calcified granulomata.. Patent   central airways.   VISUALIZED UPPER ABDOMEN: Bilateral renal indeterminate lesions,   measuring 1.4 cm on the right and 1.2 cm on the left. Hepatic cyst. Small   hiatal hernia.  BONES: No acute abnormality. Moderate T12 compression deformity, stable   since June 22, 2019.  CHEST WALL:  Unremarkable    IMPRESSION:  Extensive bilateral pulmonary embolism.    This finding was discussed with  on June 26, 2019, shortly   after the examination was performed.    Enlarged main pulmonary artery and straightening of the interventricular   septum suggestive of right heart strain.    Right upper lobe pulmonary infarct. Probable small left lower lobe   pulmonary infarct.    Bilateral indeterminate renal lesions. Recommend MRI to further evaluate.
HPI: Pt is an 84 y/o male with PMHx of HTN, BPH, diverticulosis, prostate CA, tubular adenoma who presents to the ED via EMS after a recurrent fall this afternoon. Pts reports he fell at 6pm last night and again today at 1pm while he was trying to urinate in the bathroom.  He denies LOC and reports chills, extreme weakness  and fatigue for a few  days, also assoc  bifrontal mild headache, 1-2 loose stool diarrhea and decreased appetite/PO intake. Pt saw Dr. Nik Zimmerman at 10 am on day of presentation, and was prescribed cefuroxime for chest congestion and a sinus infection (06.22.19)    SUBJECTIVE: Pt reports fever overnight. He reports cough is improved. He denies chest pain, sob. Pt does report pudding-consistency diarrhea that started after he started abx.     REVIEW OF SYSTEMS:  CONSTITUTIONAL: +fever, No weakness, chills  EYES/ENT: No visual changes;  No vertigo or throat pain   NECK: No pain or stiffness  RESPIRATORY: +cough, no wheezing, hemoptysis; No shortness of breath  CARDIOVASCULAR: No chest pain or palpitations  GASTROINTESTINAL: +diarrhea, No abdominal or epigastric pain. No nausea, vomiting, or hematemesis; No constipation. No melena or hematochezia.  GENITOURINARY: No dysuria, frequency or hematuria  NEUROLOGICAL: No numbness or weakness  SKIN: No itching, burning, rashes, or lesions   All other review of systems is negative unless indicated above    Vital Signs Last 24 Hrs  T(C): 38.1 (24 Jun 2019 08:03), Max: 39.1 (23 Jun 2019 22:31)  T(F): 100.6 (24 Jun 2019 08:03), Max: 102.4 (23 Jun 2019 22:31)  HR: 75 (24 Jun 2019 08:03) (64 - 75)  BP: 175/82 (24 Jun 2019 08:03) (110/63 - 175/82)  RR: 17 (24 Jun 2019 08:03) (16 - 18)  SpO2: 95% (24 Jun 2019 08:03) (94% - 95%)    I&O's Summary    23 Jun 2019 07:01  -  24 Jun 2019 07:00  --------------------------------------------------------  IN: 1200 mL / OUT: 0 mL / NET: 1200 mL        CAPILLARY BLOOD GLUCOSE          PHYSICAL EXAM:  Constitutional: NAD, awake and alert, well-developed  HEENT: EOMI, Normal Hearing, MMM  Neck: Soft and supple, No LAD, No JVD  Respiratory: nl respiratory effort, decreased breath sounds in right upper lung field   Cardiovascular: S1 and S2, regular rate and rhythm, no Murmurs, gallops or rubs  Gastrointestinal: Bowel Sounds present, soft, nontender, nondistended, no guarding, no rebound  Extremities: No peripheral edema  Vascular: 2+ peripheral pulses  Neurological: A/O x 3, no focal deficits  Musculoskeletal: 5/5 strength b/l upper and lower extremities  Skin: No rashes    MEDICATIONS:  MEDICATIONS  (STANDING):  aspirin enteric coated 81 milliGRAM(s) Oral daily  azithromycin  IVPB 500 milliGRAM(s) IV Intermittent every 24 hours  azithromycin  IVPB      cefTRIAXone Injectable. 1000 milliGRAM(s) IV Push every 24 hours  doxazosin 4 milliGRAM(s) Oral at bedtime  heparin  Injectable 5000 Unit(s) SubCutaneous every 8 hours  metoprolol succinate ER 12.5 milliGRAM(s) Oral daily  sodium chloride 0.9%. 1000 milliLiter(s) (75 mL/Hr) IV Continuous <Continuous>      LABS: All Labs Reviewed:                        12.8   9.60  )-----------( 169      ( 23 Jun 2019 17:00 )             37.6     06-23    139  |  105  |  25<H>  ----------------------------<  110<H>  3.0<L>   |  26  |  1.39<H>    Ca    8.4<L>      23 Jun 2019 17:00    TPro  6.6  /  Alb  2.7<L>  /  TBili  0.5  /  DBili  x   /  AST  28  /  ALT  16  /  AlkPhos  49  06-23    PT/INR - ( 22 Jun 2019 14:45 )   PT: 14.6 sec;   INR: 1.30 ratio         PTT - ( 22 Jun 2019 14:45 )  PTT:31.3 sec    Ova and Parasites (06.23.19 @ 20:45)    Culture Results:   Testing in progress    Culture - Blood (06.22.19 @ 14:55)    Specimen Source: .Blood Blood-Peripheral    Culture Results:   No growth to date.    Specimen Source: .Urine Clean Catch (Midstream) (06.22.19 @ 14:45)
Patient is a 83y old  Male who presents with a chief complaint of Fever      HPI:  Pt is an 82 y/o male with PMHx of HTN, BPH, diverticulosis, prostate CA, tubular adenoma who presents to the ED via EMS after a recurrent  fall this afternoon.    Pts reports he fell at 6pm last night and again today at 1pm while he was trying to urinate in the bathroom.  He denies LOC and reports chills, extreme weakness  and fatigue for a few  days, also assoc  bifrontal mild headache, 1-2 loose stool diarrhea and decreased appetite/PO intake. Pt saw Dr. Nik Zimmerman at 10 am this morning ,  and was prescribed cefuroxime for  chest congestion and  a sinus infection. In the ED, pt was noted to be febrile with a rectal temp 103F.  He  denied neck pain, back pain, bilateral lower extremities pain. He reports 1 week of a non-productive cough.  No SOB or chest pain.  No hx of renal problems.  On 81mg every other day.    In the ED, he received Sepsis antibiotics with 2gm Cefepime and Vanco 1gm,  and 2400 ml of LR, 650mg tylenol.  EMERGENCY consult was called this evening. I spoke with dr James and shira for ICU eval / ABG / EKG and came to see him along with Dr Najera from ICU.  Hx obtained from pt and family as well as RN staff on 3 north at bedside.  In summary pt gibran admitted with DX RUL pneumonia with inital ct chest done with IV contrast but it was not PE protocol followed by episodes of dizziness and worsened hypozxemia and earlier today with pt collapse.  CTA with PE protocol done confirmed bilateral extensive PE and Pulm infarct pattern seen RUL, LLL  pt is tachypneic but no pleuritic pain / no hemoptysis and remains on o2 nc.  now will be moved to ICU step down unit.  lovenox started earlier today  ECHO data noted and discussed  Wife and DTR are at bedside Dr Jing Rouse [phone]  no hx DVT / PE  used to be very active    6/27  seen and examined today in stepdown unit  no hemoptysis  no c/o cp  overall feels better today, not tachycardic and less tachypneic today  family updated last night in details    6/28  pt is feeling better today  episode of confusion yesterday now resolved  he is awake and alert  BP remains elevated and his meds are getting adjusted  also remains in ICU step down unit  no cp  no pleuritic discomfort  all recent data discussed with pt's DTR yesterday    6/29  No acute events occurred overnight  BP still 174/102  Having mild dyspnea on exertion and at rest    6/30  No acute pulmonary events occurred overnight  Evaluated by Anticoagulation service     7/1  Patient to undergo TTE  Now will be d/c on Xarelto over Lovenox due to cost issues     MEDICATIONS  (STANDING):  aspirin enteric coated 81 milliGRAM(s) Oral daily  cefepime  Injectable.      cefepime  Injectable. 1000 milliGRAM(s) IV Push every 12 hours  doxazosin 4 milliGRAM(s) Oral at bedtime  furosemide   Injectable 20 milliGRAM(s) IV Push once  lidocaine   Patch 1 Patch Transdermal daily  metoprolol succinate ER 25 milliGRAM(s) Oral every 12 hours  rivaroxaban 15 milliGRAM(s) Oral two times a day with meals  valsartan 320 milliGRAM(s) Oral daily    MEDICATIONS  (PRN):  acetaminophen   Tablet .. 650 milliGRAM(s) Oral every 6 hours PRN Temp greater or equal to 38C (100.4F)  acetaminophen   Tablet .. 650 milliGRAM(s) Oral every 6 hours PRN Moderate Pain (4 - 6)  ALBUTerol/ipratropium for Nebulization 3 milliLiter(s) Nebulizer every 6 hours PRN Wheezing  hydrALAZINE Injectable 5 milliGRAM(s) IV Push every 1 hour PRN SBP > 180  simethicone 80 milliGRAM(s) Chew three times a day PRN Gas        PHYSICAL EXAM  General Appearance: cooperative, no resp distress, able to speak in short sentences  HEENT: PERRL, conjunctiva clear, EOM's intact, non injected pharynx, no exudate, TM   normal  Neck: Supple, , no adenopathy, thyroid: not enlarged, no carotid bruit or JVD  Back: Symmetric, no  tenderness,no soft tissue tenderness  Lungs: CTA, no wheezing, few lower lobe rhonchi now resolved  Heart: Regular rate and rhythm, S1, S2 normal, no murmur, rub or gallop  Abdomen: Soft, non-tender, bowel sounds active , no hepatosplenomegaly  Extremities: no cyanosis or edema, no joint swelling, No calf tenderness  Skin: Skin color, texture normal, no rashes   Neurologic: Alert and oriented X3 , cranial nerves intact, sensory and motor normal,    ECG:    repeat EKG done now without acute ischemic changes    < from: 12 Lead ECG (06.26.19 @ 09:48) >  Ventricular Rate 105 BPM    Atrial Rate 105 BPM    P-R Interval 160 ms    QRS Duration 92 ms    Q-T Interval 358 ms    QTC Calculation(Bezet) 473 ms    P Axis 25 degrees    R Axis -20 degrees    T Axis 27 degrees    Diagnosis Line Sinus tachycardia  Otherwise normal ECG  When compared with ECG of 26-JUN-2019 09:48,  No significant change was found    < end of copied text >      LABS:                        12.3   13.14 )-----------( 210      ( 27 Jun 2019 06:11 )             36.1   06-27    145  |  112<H>  |  25<H>  ----------------------------<  101<H>  3.7   |  24  |  1.09    Ca    8.8      27 Jun 2019 06:11  Phos  4.2     06-27  Mg     2.3     06-26    TPro  6.2  /  Alb  2.3<L>  /  TBili  0.4  /  DBili  x   /  AST  71<H>  /  ALT  67  /  AlkPhos  52  06-27 06-26    144  |  110<H>  |  18  ----------------------------<  101<H>  3.5   |  27  |  1.00    Ca    8.9      26 Jun 2019 08:14  Mg     2.3     06-26      CARDIAC MARKERS ( 25 Jun 2019 19:32 )  0.069 ng/mL / x     / x     / x     / x      CARDIAC MARKERS ( 25 Jun 2019 16:51 )  0.088 ng/mL / x     / x     / x     / x        06-26    144  |  110<H>  |  18  ----------------------------<  101<H>  3.5   |  27  |  1.00    Ca    8.9      26 Jun 2019 08:14  Mg     2.3     06-26        Pro BNP  1555 06-25 @ 16:51  D Dimer  -- 06-25 @ 16:51    Troponin I, Serum Repeat 3 hours x 2 occurrences (06.25.19 @ 19:32)    Troponin I, Serum: 0.069: High Sensitivity Troponin and new reference  range effective 7/6/2016 ng/mL        ABG - ( 26 Jun 2019 19:43 )  pH, Arterial: 7.50  pH, Blood: x     /  pCO2: 29    /  pO2: 68    / HCO3: 22    / Base Excess: .3    /  SaO2: 93        ECHO noted      < from: Transthoracic Echocardiogram (06.26.19 @ 10:54) >   Impression     Summary     Mild concentric left ventricular hypertrophy is present.   Left ventricular wallmotion appears hyperdynamic.   Estimated left ventricular ejection fraction is 70 %.   The IVC appears minimally dilated and is collapsing with inspiration.   Lipomatous atrial septal wall noted.   Fibrocalcific changes noted to the mitral valve leaflets with preserved   leaflet excursion.   Trace mitral regurgitation is present.   EA reversal of the mitral inflow consistent with reduced compliance of   the   left ventricle.   Fibrocalcific changes noted to the Aortic valve leaflets with preserved   leaflet excursion.   Moderate (2+) tricuspid valve regurgitation is present.   The estimated RVSP is 63 mmHg.    < end of copied text >      < from: CT Chest w/ IV Cont (06.22.19 @ 16:36) >    IMPRESSION:     No acute traumatic injury.     Age-indeterminate mild superior endplate compression fracture at T12.    Posterior right upper lobe consolidation, likely pneumonia. Follow-up to   complete resolution.    1.2 cm indeterminate left renal lesion. Follow-up nonemergent MRI abdomen   with and without contrast.      < end of copied text >    RADIOLOGY & ADDITIONAL STUDIES:    < from: CT Angio Chest PE Protocol w/ IV Cont (06.26.19 @ 12:42) >  FINDINGS: personally reveiwed    LOWER NECK: Within normal limits.  AXILLA, MEDIASTINUM AND VALERIY: No lymphadenopathy.  VESSELS: Pulmonary embolism involving the descending left pulmonary   artery, right main and interlobar pulmonary arteries, and multiple   segmental and subsegmental branches of all lobes.  Enlarged main   pulmonary artery measuring 3.7 cm. Atherosclerotic arterial   calcifications, including the coronary arteries.  Normal caliber aorta.  HEART: The heart is normal in size.No pericardial effusion.  PLEURA: Trace bilateral pleural effusions.   LUNGS AND LARGE AIRWAYS: Mixed groundglass and consolidative peripheral   opacity in the right upper lobe compatible with infarction. Small   groundglass opacity in the posterior left lower lobe.No suspicious   pulmonary nodule or mass. Benign bilateral calcified granulomata.. Patent   central airways.   VISUALIZED UPPER ABDOMEN: Bilateral renal indeterminate lesions,   measuring 1.4 cm on the right and 1.2 cm on the left. Hepatic cyst. Small   hiatal hernia.  BONES: No acute abnormality. Moderate T12 compression deformity, stable   since June 22, 2019.  CHEST WALL:  Unremarkable    IMPRESSION:  Extensive bilateral pulmonary embolism.    This finding was discussed with  on June 26, 2019, shortly   after the examination was performed.    Enlarged main pulmonary artery and straightening of the interventricular   septum suggestive of right heart strain.    Right upper lobe pulmonary infarct. Probable small left lower lobe   pulmonary infarct.    Bilateral indeterminate renal lesions. Recommend MRI to further evaluate.    < end of copied text >      lower ext DVT noted on doppler lower exts
Patient is a 83y old  Male who presents with a chief complaint of Fever      HPI:  Pt is an 82 y/o male with PMHx of HTN, BPH, diverticulosis, prostate CA, tubular adenoma who presents to the ED via EMS after a recurrent  fall this afternoon.    Pts reports he fell at 6pm last night and again today at 1pm while he was trying to urinate in the bathroom.  He denies LOC and reports chills, extreme weakness  and fatigue for a few  days, also assoc  bifrontal mild headache, 1-2 loose stool diarrhea and decreased appetite/PO intake. Pt saw Dr. Nik Zimmerman at 10 am this morning ,  and was prescribed cefuroxime for  chest congestion and  a sinus infection. In the ED, pt was noted to be febrile with a rectal temp 103F.  He  denied neck pain, back pain, bilateral lower extremities pain. He reports 1 week of a non-productive cough.  No SOB or chest pain.  No hx of renal problems.  On 81mg every other day.    In the ED, he received Sepsis antibiotics with 2gm Cefepime and Vanco 1gm,  and 2400 ml of LR, 650mg tylenol.  EMERGENCY consult was called this evening. I spoke with dr James and shira for ICU eval / ABG / EKG and came to see him along with Dr Najera from ICU.  Hx obtained from pt and family as well as RN staff on [facility] at bedside.  In summary pt gibran admitted with DX RUL pneumonia with inital ct chest done with IV contrast but it was not PE protocol followed by episodes of dizziness and worsened hypozxemia and earlier today with pt collapse.  CTA with PE protocol done confirmed bilateral extensive PE and Pulm infarct pattern seen RUL, LLL  pt is tachypneic but no pleuritic pain / no hemoptysis and remains on o2 nc.  now will be moved to ICU step down unit.  lovenox started earlier today  ECHO data noted and discussed  Wife and DTR are at bedside Dr Jing Rouse [phone]  no hx DVT / PE  used to be very active    6/27  seen and examined today in stepdown unit  no hemoptysis  no c/o cp  overall feels better today, not tachycardic and less tachypneic today  family updated last night in details    6/28  pt is feeling better today  episode of confusion yesterday now resolved  he is awake and alert  BP remains elevated and his meds are getting adjusted  also remains in ICU step down unit  no cp  no pleuritic discomfort  all recent data discussed with pt's DTR yesterday    6/29  No acute events occurred overnight  BP still 174/102  Having mild dyspnea on exertion and at rest      MEDICATIONS  (STANDING):  aspirin enteric coated 81 milliGRAM(s) Oral daily  cefepime  Injectable.      cefepime  Injectable. 1000 milliGRAM(s) IV Push every 12 hours  doxazosin 4 milliGRAM(s) Oral at bedtime  enoxaparin Injectable 80 milliGRAM(s) SubCutaneous every 12 hours  furosemide   Injectable 20 milliGRAM(s) IV Push once  metoprolol succinate ER 25 milliGRAM(s) Oral daily  valsartan 160 milliGRAM(s) Oral daily    MEDICATIONS  (PRN):  acetaminophen   Tablet .. 650 milliGRAM(s) Oral every 6 hours PRN Temp greater or equal to 38C (100.4F)  hydrALAZINE Injectable 5 milliGRAM(s) IV Push every 1 hour PRN SBP > 180  simethicone 80 milliGRAM(s) Chew three times a day PRN Gas        PHYSICAL EXAM  General Appearance: cooperative, no resp distress, able to speak in short sentences  HEENT: PERRL, conjunctiva clear, EOM's intact, non injected pharynx, no exudate, TM   normal  Neck: Supple, , no adenopathy, thyroid: not enlarged, no carotid bruit or JVD  Back: Symmetric, no  tenderness,no soft tissue tenderness  Lungs: CTA, no wheezing, few lower lobe rhonchi now resolved  Heart: Regular rate and rhythm, S1, S2 normal, no murmur, rub or gallop  Abdomen: Soft, non-tender, bowel sounds active , no hepatosplenomegaly  Extremities: no cyanosis or edema, no joint swelling, No calf tenderness  Skin: Skin color, texture normal, no rashes   Neurologic: Alert and oriented X3 , cranial nerves intact, sensory and motor normal,    ECG:    repeat EKG done now without acute ischemic changes    < from: 12 Lead ECG (06.26.19 @ 09:48) >  Ventricular Rate 105 BPM    Atrial Rate 105 BPM    P-R Interval 160 ms    QRS Duration 92 ms    Q-T Interval 358 ms    QTC Calculation(Bezet) 473 ms    P Axis 25 degrees    R Axis -20 degrees    T Axis 27 degrees    Diagnosis Line Sinus tachycardia  Otherwise normal ECG  When compared with ECG of 26-JUN-2019 09:48,  No significant change was found    < end of copied text >      LABS:                        12.3   13.14 )-----------( 210      ( 27 Jun 2019 06:11 )             36.1   06-27    145  |  112<H>  |  25<H>  ----------------------------<  101<H>  3.7   |  24  |  1.09    Ca    8.8      27 Jun 2019 06:11  Phos  4.2     06-27  Mg     2.3     06-26    TPro  6.2  /  Alb  2.3<L>  /  TBili  0.4  /  DBili  x   /  AST  71<H>  /  ALT  67  /  AlkPhos  52  06-27 06-26    144  |  110<H>  |  18  ----------------------------<  101<H>  3.5   |  27  |  1.00    Ca    8.9      26 Jun 2019 08:14  Mg     2.3     06-26      CARDIAC MARKERS ( 25 Jun 2019 19:32 )  0.069 ng/mL / x     / x     / x     / x      CARDIAC MARKERS ( 25 Jun 2019 16:51 )  0.088 ng/mL / x     / x     / x     / x        06-26    144  |  110<H>  |  18  ----------------------------<  101<H>  3.5   |  27  |  1.00    Ca    8.9      26 Jun 2019 08:14  Mg     2.3     06-26        Pro BNP  1555 06-25 @ 16:51  D Dimer  -- 06-25 @ 16:51    Troponin I, Serum Repeat 3 hours x 2 occurrences (06.25.19 @ 19:32)    Troponin I, Serum: 0.069: High Sensitivity Troponin and new reference  range effective 7/6/2016 ng/mL        ABG - ( 26 Jun 2019 19:43 )  pH, Arterial: 7.50  pH, Blood: x     /  pCO2: 29    /  pO2: 68    / HCO3: 22    / Base Excess: .3    /  SaO2: 93        ECHO noted      < from: Transthoracic Echocardiogram (06.26.19 @ 10:54) >   Impression     Summary     Mild concentric left ventricular hypertrophy is present.   Left ventricular wallmotion appears hyperdynamic.   Estimated left ventricular ejection fraction is 70 %.   The IVC appears minimally dilated and is collapsing with inspiration.   Lipomatous atrial septal wall noted.   Fibrocalcific changes noted to the mitral valve leaflets with preserved   leaflet excursion.   Trace mitral regurgitation is present.   EA reversal of the mitral inflow consistent with reduced compliance of   the   left ventricle.   Fibrocalcific changes noted to the Aortic valve leaflets with preserved   leaflet excursion.   Moderate (2+) tricuspid valve regurgitation is present.   The estimated RVSP is 63 mmHg.    < end of copied text >      < from: CT Chest w/ IV Cont (06.22.19 @ 16:36) >    IMPRESSION:     No acute traumatic injury.     Age-indeterminate mild superior endplate compression fracture at T12.    Posterior right upper lobe consolidation, likely pneumonia. Follow-up to   complete resolution.    1.2 cm indeterminate left renal lesion. Follow-up nonemergent MRI abdomen   with and without contrast.      < end of copied text >    RADIOLOGY & ADDITIONAL STUDIES:    < from: CT Angio Chest PE Protocol w/ IV Cont (06.26.19 @ 12:42) >  FINDINGS: personally reveiwed    LOWER NECK: Within normal limits.  AXILLA, MEDIASTINUM AND VALERIY: No lymphadenopathy.  VESSELS: Pulmonary embolism involving the descending left pulmonary   artery, right main and interlobar pulmonary arteries, and multiple   segmental and subsegmental branches of all lobes.  Enlarged main   pulmonary artery measuring 3.7 cm. Atherosclerotic arterial   calcifications, including the coronary arteries.  Normal caliber aorta.  HEART: The heart is normal in size.No pericardial effusion.  PLEURA: Trace bilateral pleural effusions.   LUNGS AND LARGE AIRWAYS: Mixed groundglass and consolidative peripheral   opacity in the right upper lobe compatible with infarction. Small   groundglass opacity in the posterior left lower lobe.No suspicious   pulmonary nodule or mass. Benign bilateral calcified granulomata.. Patent   central airways.   VISUALIZED UPPER ABDOMEN: Bilateral renal indeterminate lesions,   measuring 1.4 cm on the right and 1.2 cm on the left. Hepatic cyst. Small   hiatal hernia.  BONES: No acute abnormality. Moderate T12 compression deformity, stable   since June 22, 2019.  CHEST WALL:  Unremarkable    IMPRESSION:  Extensive bilateral pulmonary embolism.    This finding was discussed with  on June 26, 2019, shortly   after the examination was performed.    Enlarged main pulmonary artery and straightening of the interventricular   septum suggestive of right heart strain.    Right upper lobe pulmonary infarct. Probable small left lower lobe   pulmonary infarct.    Bilateral indeterminate renal lesions. Recommend MRI to further evaluate.    < end of copied text >      lower ext DVT noted on doppler lower exts
Patient is a 83y old  Male who presents with a chief complaint of Fever      HPI:  Pt is an 84 y/o male with PMHx of HTN, BPH, diverticulosis, prostate CA, tubular adenoma who presents to the ED via EMS after a recurrent  fall this afternoon.    Pts reports he fell at 6pm last night and again today at 1pm while he was trying to urinate in the bathroom.  He denies LOC and reports chills, extreme weakness  and fatigue for a few  days, also assoc  bifrontal mild headache, 1-2 loose stool diarrhea and decreased appetite/PO intake. Pt saw Dr. Nik Zimmerman at 10 am this morning ,  and was prescribed cefuroxime for  chest congestion and  a sinus infection. In the ED, pt was noted to be febrile with a rectal temp 103F.  He  denied neck pain, back pain, bilateral lower extremities pain. He reports 1 week of a non-productive cough.  No SOB or chest pain.  No hx of renal problems.  On 81mg every other day.    In the ED, he received Sepsis antibiotics with 2gm Cefepime and Vanco 1gm,  and 2400 ml of LR, 650mg tylenol.  EMERGENCY consult was called this evening. I spoke with dr James and shira for ICU eval / ABG / EKG and came to see him along with Dr Najera from ICU.  Hx obtained from pt and family as well as RN staff on 3 north at bedside.  In summary pt gibran admitted with DX RUL pneumonia with inital ct chest done with IV contrast but it was not PE protocol followed by episodes of dizziness and worsened hypozxemia and earlier today with pt collapse.  CTA with PE protocol done confirmed bilateral extensive PE and Pulm infarct pattern seen RUL, LLL  pt is tachypneic but no pleuritic pain / no hemoptysis and remains on o2 nc.  now will be moved to ICU step down unit.  lovenox started earlier today  ECHO data noted and discussed  Wife and DTR are at bedside Dr Jing Rouse [phone]  no hx DVT / PE  used to be very active    6/27  seen and examined today in stepdown unit  no hemoptysis  no c/o cp  overall feels better today, not tachycardic and less tachypneic today  family updated last night in details    6/28  pt is feeling better today  episode of confusion yesterday now resolved  he is awake and alert  BP remains elevated and his meds are getting adjusted  also remains in ICU step down unit  no cp  no pleuritic discomfort  all recent data discussed with pt's DTR yesterday    6/29  No acute events occurred overnight  BP still 174/102  Having mild dyspnea on exertion and at rest    6/30  No acute pulmonary events occurred overnight  Evaluated by Anticoagulation service       MEDICATIONS  (STANDING):  aspirin enteric coated 81 milliGRAM(s) Oral daily  cefepime  Injectable.      cefepime  Injectable. 1000 milliGRAM(s) IV Push every 12 hours  doxazosin 4 milliGRAM(s) Oral at bedtime  enoxaparin Injectable 80 milliGRAM(s) SubCutaneous every 12 hours  furosemide   Injectable 20 milliGRAM(s) IV Push once  lidocaine   Patch 1 Patch Transdermal daily  metoprolol succinate ER 25 milliGRAM(s) Oral every 12 hours  valsartan 320 milliGRAM(s) Oral daily    MEDICATIONS  (PRN):  acetaminophen   Tablet .. 650 milliGRAM(s) Oral every 6 hours PRN Temp greater or equal to 38C (100.4F)  acetaminophen   Tablet .. 650 milliGRAM(s) Oral every 6 hours PRN Moderate Pain (4 - 6)  ALBUTerol/ipratropium for Nebulization 3 milliLiter(s) Nebulizer every 6 hours PRN Wheezing  hydrALAZINE Injectable 5 milliGRAM(s) IV Push every 1 hour PRN SBP > 180  simethicone 80 milliGRAM(s) Chew three times a day PRN Gas        PHYSICAL EXAM  General Appearance: cooperative, no resp distress, able to speak in short sentences  HEENT: PERRL, conjunctiva clear, EOM's intact, non injected pharynx, no exudate, TM   normal  Neck: Supple, , no adenopathy, thyroid: not enlarged, no carotid bruit or JVD  Back: Symmetric, no  tenderness,no soft tissue tenderness  Lungs: CTA, no wheezing, few lower lobe rhonchi now resolved  Heart: Regular rate and rhythm, S1, S2 normal, no murmur, rub or gallop  Abdomen: Soft, non-tender, bowel sounds active , no hepatosplenomegaly  Extremities: no cyanosis or edema, no joint swelling, No calf tenderness  Skin: Skin color, texture normal, no rashes   Neurologic: Alert and oriented X3 , cranial nerves intact, sensory and motor normal,    ECG:    repeat EKG done now without acute ischemic changes    < from: 12 Lead ECG (06.26.19 @ 09:48) >  Ventricular Rate 105 BPM    Atrial Rate 105 BPM    P-R Interval 160 ms    QRS Duration 92 ms    Q-T Interval 358 ms    QTC Calculation(Bezet) 473 ms    P Axis 25 degrees    R Axis -20 degrees    T Axis 27 degrees    Diagnosis Line Sinus tachycardia  Otherwise normal ECG  When compared with ECG of 26-JUN-2019 09:48,  No significant change was found    < end of copied text >      LABS:                        12.3   13.14 )-----------( 210      ( 27 Jun 2019 06:11 )             36.1   06-27    145  |  112<H>  |  25<H>  ----------------------------<  101<H>  3.7   |  24  |  1.09    Ca    8.8      27 Jun 2019 06:11  Phos  4.2     06-27  Mg     2.3     06-26    TPro  6.2  /  Alb  2.3<L>  /  TBili  0.4  /  DBili  x   /  AST  71<H>  /  ALT  67  /  AlkPhos  52  06-27 06-26    144  |  110<H>  |  18  ----------------------------<  101<H>  3.5   |  27  |  1.00    Ca    8.9      26 Jun 2019 08:14  Mg     2.3     06-26      CARDIAC MARKERS ( 25 Jun 2019 19:32 )  0.069 ng/mL / x     / x     / x     / x      CARDIAC MARKERS ( 25 Jun 2019 16:51 )  0.088 ng/mL / x     / x     / x     / x        06-26    144  |  110<H>  |  18  ----------------------------<  101<H>  3.5   |  27  |  1.00    Ca    8.9      26 Jun 2019 08:14  Mg     2.3     06-26        Pro BNP  1555 06-25 @ 16:51  D Dimer  -- 06-25 @ 16:51    Troponin I, Serum Repeat 3 hours x 2 occurrences (06.25.19 @ 19:32)    Troponin I, Serum: 0.069: High Sensitivity Troponin and new reference  range effective 7/6/2016 ng/mL        ABG - ( 26 Jun 2019 19:43 )  pH, Arterial: 7.50  pH, Blood: x     /  pCO2: 29    /  pO2: 68    / HCO3: 22    / Base Excess: .3    /  SaO2: 93        ECHO noted      < from: Transthoracic Echocardiogram (06.26.19 @ 10:54) >   Impression     Summary     Mild concentric left ventricular hypertrophy is present.   Left ventricular wallmotion appears hyperdynamic.   Estimated left ventricular ejection fraction is 70 %.   The IVC appears minimally dilated and is collapsing with inspiration.   Lipomatous atrial septal wall noted.   Fibrocalcific changes noted to the mitral valve leaflets with preserved   leaflet excursion.   Trace mitral regurgitation is present.   EA reversal of the mitral inflow consistent with reduced compliance of   the   left ventricle.   Fibrocalcific changes noted to the Aortic valve leaflets with preserved   leaflet excursion.   Moderate (2+) tricuspid valve regurgitation is present.   The estimated RVSP is 63 mmHg.    < end of copied text >      < from: CT Chest w/ IV Cont (06.22.19 @ 16:36) >    IMPRESSION:     No acute traumatic injury.     Age-indeterminate mild superior endplate compression fracture at T12.    Posterior right upper lobe consolidation, likely pneumonia. Follow-up to   complete resolution.    1.2 cm indeterminate left renal lesion. Follow-up nonemergent MRI abdomen   with and without contrast.      < end of copied text >    RADIOLOGY & ADDITIONAL STUDIES:    < from: CT Angio Chest PE Protocol w/ IV Cont (06.26.19 @ 12:42) >  FINDINGS: personally reveiwed    LOWER NECK: Within normal limits.  AXILLA, MEDIASTINUM AND VALERIY: No lymphadenopathy.  VESSELS: Pulmonary embolism involving the descending left pulmonary   artery, right main and interlobar pulmonary arteries, and multiple   segmental and subsegmental branches of all lobes.  Enlarged main   pulmonary artery measuring 3.7 cm. Atherosclerotic arterial   calcifications, including the coronary arteries.  Normal caliber aorta.  HEART: The heart is normal in size.No pericardial effusion.  PLEURA: Trace bilateral pleural effusions.   LUNGS AND LARGE AIRWAYS: Mixed groundglass and consolidative peripheral   opacity in the right upper lobe compatible with infarction. Small   groundglass opacity in the posterior left lower lobe.No suspicious   pulmonary nodule or mass. Benign bilateral calcified granulomata.. Patent   central airways.   VISUALIZED UPPER ABDOMEN: Bilateral renal indeterminate lesions,   measuring 1.4 cm on the right and 1.2 cm on the left. Hepatic cyst. Small   hiatal hernia.  BONES: No acute abnormality. Moderate T12 compression deformity, stable   since June 22, 2019.  CHEST WALL:  Unremarkable    IMPRESSION:  Extensive bilateral pulmonary embolism.    This finding was discussed with  on June 26, 2019, shortly   after the examination was performed.    Enlarged main pulmonary artery and straightening of the interventricular   septum suggestive of right heart strain.    Right upper lobe pulmonary infarct. Probable small left lower lobe   pulmonary infarct.    Bilateral indeterminate renal lesions. Recommend MRI to further evaluate.    < end of copied text >      lower ext DVT noted on doppler lower exts
Patient is a 83y old  Male who presents with a chief complaint of Fever  RUL Pna  Weakness  Fall  Abd Distension  Overflow Incontinence/Urinary Urgency (2019 12:30)      HPI:  Pt is an 82 y/o male with PMHx of HTN, BPH, diverticulosis, prostate CA, tubular adenoma who presents to the ED via EMS after a recurrent  fall this afternoon.    Pts reports he fell at 6pm last night and again today at 1pm while he was trying to urinate in the bathroom.  He denies LOC and reports chills, extreme weakness  and fatigue for a few  days, also assoc  bifrontal mild headache, 1-2 loose stool diarrhea and decreased appetite/PO intake. Pt saw Dr. Nik Zimmerman at 10 am this morning ,  and was prescribed cefuroxime for  chest congestion and  a sinus infection.     In the ED, pt was noted to be febrile with a rectal temp 103F.  He  denied neck pain, back pain, bilateral lower extremities pain. He reports 1 week of a non-productive cough.  No SOB or chest pain.  No hx of renal problems.  On 81mg every other day.      In the ED, he received Sepsis antibiotics with 2gm Cefepime and Vanco 1gm,  and 2400 ml of LR, 650mg tylenol.  EMERGENCY consult was called this evening. I spoke with dr James and shira for ICU eval / ABG / EKG and came to see him along with Dr Najera from ICU.  Hx obtained from pt and family as well as RN staff on 3 [facility] at bedside.  In summary pt gibran admitted with DX RUL pneumonia with inital ct chest done with IV contrast but it was not PE protocol followed by episodes of dizziness and worsened hypozxemia and earlier today with pt collapse.  CTA with PE protocol done confirmed bilateral extensive PE and Pulm infarct pattern seen RUL, LLL  pt is tachypneic but no pleuritic pain / no hemoptysis and remains on o2 nc.  now will be moved to ICU step down unit.  lovenox started earlier today  ECHO data noted and discussed  Wife and DTR are at bedside Dr Jing Rouse [phone]  no hx DVT / PE  used to be very active      seen and examined today in stepdown unit  no hemoptysis  no c/o cp  overall feels better today, not tachycardic and less tachypneic today  family updated last night in details      pt is feeling better today  episode of confusion yesterday now resolved  he is awake and alert  BP remains elevated and his meds are getting adjusted  also remains in ICU step down unit  no cp  no pleuritic discomfort  all recent data discussed with pt's DTR yesterday    Fam Hx:  Father  at 70 , Multiple Myeloma  Mother  at 91, Dementia (2019 20:26)      PAST MEDICAL & SURGICAL HISTORY:  Diverticulosis: sigmoid  Tubular adenoma  BPH (Benign Prostatic Hyperplasia)  History of prostate cancer: seed implant 2004  HTN (hypertension)  Sensorineural hearing loss: bilateral  Rhinitis  Deviated nasal septum  History of atypical nevus  S/P cataract surgery: left eye  Prostate ca: with seed implantation 2004  Mastoid disorder  History of tonsillectomy  History of rhinoplasty      PREVIOUS DIAGNOSTIC TESTING:      MEDICATIONS  (STANDING):  aspirin enteric coated 81 milliGRAM(s) Oral daily  cefepime  Injectable.      cefepime  Injectable. 1000 milliGRAM(s) IV Push every 12 hours  doxazosin 4 milliGRAM(s) Oral at bedtime  enoxaparin Injectable 80 milliGRAM(s) SubCutaneous every 12 hours  furosemide   Injectable 20 milliGRAM(s) IV Push once  metoprolol succinate ER 25 milliGRAM(s) Oral daily  valsartan 160 milliGRAM(s) Oral daily        MEDICATIONS  (PRN):  acetaminophen   Tablet .. 650 milliGRAM(s) Oral every 6 hours PRN Temp greater or equal to 38C (100.4F)  hydrALAZINE Injectable 5 milliGRAM(s) IV Push every 1 hour PRN SBP > 180      FAMILY HISTORY:      SOCIAL HISTORY:  3quit smoking 60 yrs ago  retired electronic     REVIEW OF SYSTEM:  Pertinent items are noted in HPI.  Constitutional negative for chills, fevers, sweats and weight loss  throat, and face:  negative for epistaxis, nasal congestion, sore throat and   tinnitus  Respiratory: pos for cough, dyspnea on exertion,no pleuritic chest pain  and wheezing  Cardiovascular:  no for chest pain, pos dyspnea and palpitations  Gastrointestinal: negative for abdominal pain, diarrhea, nausea and vomiting  Genitourinary: negative for dysuria, frequency and urinary incontinence  Skin:  negative for redness, rash, pruritus, swelling, dryness and   fissures  Hematologic/lymphatic: negative for bleeding and easy bruising  Musculoskeletal: negative for arthralgias, back pain and muscle weakness  Neurological: negative for dizziness, headaches, seizures and tremors  Behavioral/Psych:  negative for mood change, depression, suicidal attempts    Allergic/Immunologic: negative for anaphylaxis, angioedema and urticaria  Vital Signs Last 24 Hrs  T(C): 36.7 (2019 05:19), Max: 37.8 (2019 18:29)  T(F): 98 (2019 05:19), Max: 100 (2019 18:29)  HR: 78 (2019 07:00) (60 - 110)  BP: 176/99 (2019 06:00) (130/77 - 176/99)  BP(mean): 120 (2019 06:00) (93 - 120)  RR: 22 (2019 06:00) (17 - 34)  SpO2: 96% (2019 07:00) (92% - 97%)  I&O's Detail    2019 07:01  -  2019 07:00  --------------------------------------------------------  IN:    Oral Fluid: 240 mL  Total IN: 240 mL    OUT:    Voided: 850 mL  Total OUT: 850 mL    Total NET: -610 mL          PHYSICAL EXAM  General Appearance: cooperative, no resp distress, able to speak in short sentences  HEENT: PERRL, conjunctiva clear, EOM's intact, non injected pharynx, no exudate, TM   normal  Neck: Supple, , no adenopathy, thyroid: not enlarged, no carotid bruit or JVD  Back: Symmetric, no  tenderness,no soft tissue tenderness  Lungs: CTA, no wheezing, few lower lobe rhonchi now resolved  Heart: Regular rate and rhythm, S1, S2 normal, no murmur, rub or gallop  Abdomen: Soft, non-tender, bowel sounds active , no hepatosplenomegaly  Extremities: no cyanosis or edema, no joint swelling, No calf tenderness  Skin: Skin color, texture normal, no rashes   Neurologic: Alert and oriented X3 , cranial nerves intact, sensory and motor normal,    ECG:    repeat EKG done now without acute ischemic changes    < from: 12 Lead ECG (19 @ 09:48) >  Ventricular Rate 105 BPM    Atrial Rate 105 BPM    P-R Interval 160 ms    QRS Duration 92 ms    Q-T Interval 358 ms    QTC Calculation(Bezet) 473 ms    P Axis 25 degrees    R Axis -20 degrees    T Axis 27 degrees    Diagnosis Line Sinus tachycardia  Otherwise normal ECG  When compared with ECG of 2019 09:48,  No significant change was found    < end of copied text >      LABS:                        12.3   13.14 )-----------( 210      ( 2019 06:11 )             36.1       145  |  112<H>  |  25<H>  ----------------------------<  101<H>  3.7   |  24  |  1.09    Ca    8.8      2019 06:11  Phos  4.2       Mg     2.3         TPro  6.2  /  Alb  2.3<L>  /  TBili  0.4  /  DBili  x   /  AST  71<H>  /  ALT  67  /  AlkPhos  52      144  |  110<H>  |  18  ----------------------------<  101<H>  3.5   |  27  |  1.00    Ca    8.9      2019 08:14  Mg     2.3           CARDIAC MARKERS ( 2019 19:32 )  0.069 ng/mL / x     / x     / x     / x      CARDIAC MARKERS ( 2019 16:51 )  0.088 ng/mL / x     / x     / x     / x            144  |  110<H>  |  18  ----------------------------<  101<H>  3.5   |  27  |  1.00    Ca    8.9      2019 08:14  Mg     2.3             Pro BNP  1555  @ 16:51  D Dimer  --  @ 16:51    Troponin I, Serum Repeat 3 hours x 2 occurrences (19 @ 19:32)    Troponin I, Serum: 0.069: High Sensitivity Troponin and new reference  range effective 2016 ng/mL        ABG - ( 2019 19:43 )  pH, Arterial: 7.50  pH, Blood: x     /  pCO2: 29    /  pO2: 68    / HCO3: 22    / Base Excess: .3    /  SaO2: 93        ECHO noted      < from: Transthoracic Echocardiogram (19 @ 10:54) >   Impression     Summary     Mild concentric left ventricular hypertrophy is present.   Left ventricular wallmotion appears hyperdynamic.   Estimated left ventricular ejection fraction is 70 %.   The IVC appears minimally dilated and is collapsing with inspiration.   Lipomatous atrial septal wall noted.   Fibrocalcific changes noted to the mitral valve leaflets with preserved   leaflet excursion.   Trace mitral regurgitation is present.   EA reversal of the mitral inflow consistent with reduced compliance of   the   left ventricle.   Fibrocalcific changes noted to the Aortic valve leaflets with preserved   leaflet excursion.   Moderate (2+) tricuspid valve regurgitation is present.   The estimated RVSP is 63 mmHg.    < end of copied text >      < from: CT Chest w/ IV Cont (19 @ 16:36) >    IMPRESSION:     No acute traumatic injury.     Age-indeterminate mild superior endplate compression fracture at T12.    Posterior right upper lobe consolidation, likely pneumonia. Follow-up to   complete resolution.    1.2 cm indeterminate left renal lesion. Follow-up nonemergent MRI abdomen   with and without contrast.      < end of copied text >    RADIOLOGY & ADDITIONAL STUDIES:    < from: CT Angio Chest PE Protocol w/ IV Cont (19 @ 12:42) >  FINDINGS: personally reveiwed    LOWER NECK: Within normal limits.  AXILLA, MEDIASTINUM AND VALERIY: No lymphadenopathy.  VESSELS: Pulmonary embolism involving the descending left pulmonary   artery, right main and interlobar pulmonary arteries, and multiple   segmental and subsegmental branches of all lobes.  Enlarged main   pulmonary artery measuring 3.7 cm. Atherosclerotic arterial   calcifications, including the coronary arteries.  Normal caliber aorta.  HEART: The heart is normal in size.No pericardial effusion.  PLEURA: Trace bilateral pleural effusions.   LUNGS AND LARGE AIRWAYS: Mixed groundglass and consolidative peripheral   opacity in the right upper lobe compatible with infarction. Small   groundglass opacity in the posterior left lower lobe.No suspicious   pulmonary nodule or mass. Benign bilateral calcified granulomata.. Patent   central airways.   VISUALIZED UPPER ABDOMEN: Bilateral renal indeterminate lesions,   measuring 1.4 cm on the right and 1.2 cm on the left. Hepatic cyst. Small   hiatal hernia.  BONES: No acute abnormality. Moderate T12 compression deformity, stable   since 2019.  CHEST WALL:  Unremarkable    IMPRESSION:  Extensive bilateral pulmonary embolism.    This finding was discussed with  on 2019, shortly   after the examination was performed.    Enlarged main pulmonary artery and straightening of the interventricular   septum suggestive of right heart strain.    Right upper lobe pulmonary infarct. Probable small left lower lobe   pulmonary infarct.    Bilateral indeterminate renal lesions. Recommend MRI to further evaluate.    < end of copied text >      lower ext DVT noted on doppler lower exts
Patient is a 83y old  Male who presents with a chief complaint of Fever  RUL Pna  Weakness  Fall  Abd Distension  Overflow Incontinence/Urinary Urgency (2019 12:30)      HPI:  Pt is an 84 y/o male with PMHx of HTN, BPH, diverticulosis, prostate CA, tubular adenoma who presents to the ED via EMS after a recurrent  fall this afternoon.    Pts reports he fell at 6pm last night and again today at 1pm while he was trying to urinate in the bathroom.  He denies LOC and reports chills, extreme weakness  and fatigue for a few  days, also assoc  bifrontal mild headache, 1-2 loose stool diarrhea and decreased appetite/PO intake. Pt saw Dr. Nik Zimmerman at 10 am this morning ,  and was prescribed cefuroxime for  chest congestion and  a sinus infection.     In the ED, pt was noted to be febrile with a rectal temp 103F.  He  denied neck pain, back pain, bilateral lower extremities pain. He reports 1 week of a non-productive cough.  No SOB or chest pain.  No hx of renal problems.  On 81mg every other day.      In the ED, he received Sepsis antibiotics with 2gm Cefepime and Vanco 1gm,  and 2400 ml of LR, 650mg tylenol.  EMERGENCY consult was called this evening. I spoke with dr James and shira for ICU eval / ABG / EKG and came to see him along with Dr Najera from ICU.  Hx obtained from pt and family as well as RN staff on 3 north at bedside.  In summary pt gibran admitted with DX RUL pneumonia with inital ct chest done with IV contrast but it was not PE protocol followed by episodes of dizziness and worsened hypozxemia and earlier today with pt collapse.  CTA with PE protocol done confirmed bilateral extensive PE and Pulm infarct pattern seen RUL, LLL  pt is tachypneic but no pleuritic pain / no hemoptysis and remains on o2 nc.  now will be moved to ICU step down unit.  lovenox started earlier today  ECHO data noted and discussed  Wife and DTR are at bedside Dr Jing Rouse [phone]  no hx DVT / PE  used to be very active      seen and examined today in stepdown unit  no hemoptysis  no c/o cp  overall feels better today, not tachycardic and less tachypneic today  family updated last night in details    Fam Hx:  Father  at 70 , Multiple Myeloma  Mother  at 91, Dementia (2019 20:26)      PAST MEDICAL & SURGICAL HISTORY:  Diverticulosis: sigmoid  Tubular adenoma  BPH (Benign Prostatic Hyperplasia)  History of prostate cancer: seed implant 2004  HTN (hypertension)  Sensorineural hearing loss: bilateral  Rhinitis  Deviated nasal septum  History of atypical nevus  S/P cataract surgery: left eye  Prostate ca: with seed implantation 2004  Mastoid disorder  History of tonsillectomy  History of rhinoplasty      PREVIOUS DIAGNOSTIC TESTING:      MEDICATIONS  (STANDING):  aspirin enteric coated 81 milliGRAM(s) Oral daily  azithromycin  IVPB 500 milliGRAM(s) IV Intermittent every 24 hours  azithromycin  IVPB      cefepime  Injectable.      cefepime  Injectable. 1000 milliGRAM(s) IV Push every 12 hours  doxazosin 4 milliGRAM(s) Oral at bedtime  enoxaparin Injectable 80 milliGRAM(s) SubCutaneous every 12 hours  furosemide   Injectable 20 milliGRAM(s) IV Push once  metoprolol succinate ER 25 milliGRAM(s) Oral daily  sodium chloride 0.45%. 1000 milliLiter(s) (75 mL/Hr) IV Continuous <Continuous>  sodium chloride 0.9%. 1000 milliLiter(s) (50 mL/Hr) IV Continuous <Continuous>  valsartan 160 milliGRAM(s) Oral daily      MEDICATIONS  (PRN):  acetaminophen   Tablet .. 650 milliGRAM(s) Oral every 6 hours PRN Temp greater or equal to 38C (100.4F)  hydrALAZINE Injectable 5 milliGRAM(s) IV Push every 1 hour PRN SBP > 180      FAMILY HISTORY:      SOCIAL HISTORY:  3quit smoking 60 yrs ago  retired electronic     REVIEW OF SYSTEM:  Pertinent items are noted in HPI.  Constitutional negative for chills, fevers, sweats and weight loss  throat, and face:  negative for epistaxis, nasal congestion, sore throat and   tinnitus  Respiratory: pos for cough, dyspnea on exertion,no pleuritic chest pain  and wheezing  Cardiovascular:  no for chest pain, pos dyspnea and palpitations  Gastrointestinal: negative for abdominal pain, diarrhea, nausea and vomiting  Genitourinary: negative for dysuria, frequency and urinary incontinence  Skin:  negative for redness, rash, pruritus, swelling, dryness and   fissures  Hematologic/lymphatic: negative for bleeding and easy bruising  Musculoskeletal: negative for arthralgias, back pain and muscle weakness  Neurological: negative for dizziness, headaches, seizures and tremors  Behavioral/Psych:  negative for mood change, depression, suicidal attempts    Allergic/Immunologic: negative for anaphylaxis, angioedema and urticaria  Vital Signs Last 24 Hrs  T(C): 36.6 (2019 06:53), Max: 36.8 (2019 21:28)  T(F): 97.8 (2019 06:53), Max: 98.2 (2019 21:28)  HR: 53 (2019 06:00) (53 - 108)  BP: 120/79 (2019 06:00) (115/75 - 177/103)  BP(mean): 92 (2019 06:00) (87 - 106)  RR: 24 (2019 06:00) (17 - 31)  SpO2: 93% (2019 06:00) (89% - 95%)    I&O's Summary    2019 07:01  -  2019 07:00  --------------------------------------------------------  IN: 0 mL / OUT: 200 mL / NET: -200 mL      PHYSICAL EXAM  General Appearance: cooperative, no resp distress, able to speak in short sentences  HEENT: PERRL, conjunctiva clear, EOM's intact, non injected pharynx, no exudate, TM   normal  Neck: Supple, , no adenopathy, thyroid: not enlarged, no carotid bruit or JVD  Back: Symmetric, no  tenderness,no soft tissue tenderness  Lungs: mild decreased breath sounds on bases, no wheezing, few lower lobe rhonchi  Heart: Regular rate and rhythm, S1, S2 normal, no murmur, rub or gallop  Abdomen: Soft, non-tender, bowel sounds active , no hepatosplenomegaly  Extremities: no cyanosis or edema, no joint swelling, No calf tenderness  Skin: Skin color, texture normal, no rashes   Neurologic: Alert and oriented X3 , cranial nerves intact, sensory and motor normal,    ECG:    repeat EKG done now without acute ischemic changes    < from: 12 Lead ECG (19 @ 09:48) >  Ventricular Rate 105 BPM    Atrial Rate 105 BPM    P-R Interval 160 ms    QRS Duration 92 ms    Q-T Interval 358 ms    QTC Calculation(Bezet) 473 ms    P Axis 25 degrees    R Axis -20 degrees    T Axis 27 degrees    Diagnosis Line Sinus tachycardia  Otherwise normal ECG  When compared with ECG of 2019 09:48,  No significant change was found    < end of copied text >      LABS:                        12.3   13.14 )-----------( 210      ( 2019 06:11 )             36.1       145  |  112<H>  |  25<H>  ----------------------------<  101<H>  3.7   |  24  |  1.09    Ca    8.8      2019 06:11  Phos  4.2       Mg     2.3         TPro  6.2  /  Alb  2.3<L>  /  TBili  0.4  /  DBili  x   /  AST  71<H>  /  ALT  67  /  AlkPhos  52      144  |  110<H>  |  18  ----------------------------<  101<H>  3.5   |  27  |  1.00    Ca    8.9      2019 08:14  Mg     2.3           CARDIAC MARKERS ( 2019 19:32 )  0.069 ng/mL / x     / x     / x     / x      CARDIAC MARKERS ( 2019 16:51 )  0.088 ng/mL / x     / x     / x     / x            144  |  110<H>  |  18  ----------------------------<  101<H>  3.5   |  27  |  1.00    Ca    8.9      2019 08:14  Mg     2.3             Pro BNP  1555  @ 16:51  D Dimer  --  @ 16:51    Troponin I, Serum Repeat 3 hours x 2 occurrences (19 @ 19:32)    Troponin I, Serum: 0.069: High Sensitivity Troponin and new reference  range effective 2016 ng/mL        ABG - ( 2019 19:43 )  pH, Arterial: 7.50  pH, Blood: x     /  pCO2: 29    /  pO2: 68    / HCO3: 22    / Base Excess: .3    /  SaO2: 93        ECHO noted      < from: Transthoracic Echocardiogram (19 @ 10:54) >   Impression     Summary     Mild concentric left ventricular hypertrophy is present.   Left ventricular wallmotion appears hyperdynamic.   Estimated left ventricular ejection fraction is 70 %.   The IVC appears minimally dilated and is collapsing with inspiration.   Lipomatous atrial septal wall noted.   Fibrocalcific changes noted to the mitral valve leaflets with preserved   leaflet excursion.   Trace mitral regurgitation is present.   EA reversal of the mitral inflow consistent with reduced compliance of   the   left ventricle.   Fibrocalcific changes noted to the Aortic valve leaflets with preserved   leaflet excursion.   Moderate (2+) tricuspid valve regurgitation is present.   The estimated RVSP is 63 mmHg.    < end of copied text >      < from: CT Chest w/ IV Cont (19 @ 16:36) >    IMPRESSION:     No acute traumatic injury.     Age-indeterminate mild superior endplate compression fracture at T12.    Posterior right upper lobe consolidation, likely pneumonia. Follow-up to   complete resolution.    1.2 cm indeterminate left renal lesion. Follow-up nonemergent MRI abdomen   with and without contrast.      < end of copied text >    RADIOLOGY & ADDITIONAL STUDIES:    < from: CT Angio Chest PE Protocol w/ IV Cont (19 @ 12:42) >  FINDINGS: personally reveiwed    LOWER NECK: Within normal limits.  AXILLA, MEDIASTINUM AND VALERIY: No lymphadenopathy.  VESSELS: Pulmonary embolism involving the descending left pulmonary   artery, right main and interlobar pulmonary arteries, and multiple   segmental and subsegmental branches of all lobes.  Enlarged main   pulmonary artery measuring 3.7 cm. Atherosclerotic arterial   calcifications, including the coronary arteries.  Normal caliber aorta.  HEART: The heart is normal in size.No pericardial effusion.  PLEURA: Trace bilateral pleural effusions.   LUNGS AND LARGE AIRWAYS: Mixed groundglass and consolidative peripheral   opacity in the right upper lobe compatible with infarction. Small   groundglass opacity in the posterior left lower lobe.No suspicious   pulmonary nodule or mass. Benign bilateral calcified granulomata.. Patent   central airways.   VISUALIZED UPPER ABDOMEN: Bilateral renal indeterminate lesions,   measuring 1.4 cm on the right and 1.2 cm on the left. Hepatic cyst. Small   hiatal hernia.  BONES: No acute abnormality. Moderate T12 compression deformity, stable   since 2019.  CHEST WALL:  Unremarkable    IMPRESSION:  Extensive bilateral pulmonary embolism.    This finding was discussed with  on 2019, shortly   after the examination was performed.    Enlarged main pulmonary artery and straightening of the interventricular   septum suggestive of right heart strain.    Right upper lobe pulmonary infarct. Probable small left lower lobe   pulmonary infarct.    Bilateral indeterminate renal lesions. Recommend MRI to further evaluate.    < end of copied text >
REASON FOR VISIT: PE    HPI:  82 y/o man with a history of HTN, BPH, diverticulosis, prostate CA, admitted on 6/22/19 following a fall; found to be febrile and diagnosed with sepsis / pneumonia; hospitalization complicated by syncope, acute pulmonary embolism.    6/26/19   Events noted , patient did walk with physical therapy  patient did feel  sob , dizzy , hypoxic  unresponsive on the floor normal blood pressure  , patient was transferred to stepdown ccu , patient on oxygen ,  no sob at rest , patients echo showed elevated RVSP  6/27/19: Developed pulmonary embolus yesterday and started on lovenox. No CP .  Breathing reasonable.   6/28/19:  Feels "much better."  Occasional cough but no hemoptysis; mild dyspnea.    MEDICATIONS  (STANDING):  aspirin enteric coated 81 milliGRAM(s) Oral daily  cefepime  Injectable. 1000 milliGRAM(s) IV Push every 12 hours  doxazosin 4 milliGRAM(s) Oral at bedtime  enoxaparin Injectable 80 milliGRAM(s) SubCutaneous every 12 hours  furosemide   Injectable 20 milliGRAM(s) IV Push once  metoprolol succinate ER 25 milliGRAM(s) Oral daily  valsartan 160 milliGRAM(s) Oral daily    Vital Signs Last 24 Hrs  T(C): 37 (28 Jun 2019 08:42), Max: 37.8 (27 Jun 2019 18:29)  T(F): 98.6 (28 Jun 2019 08:42), Max: 100 (27 Jun 2019 18:29)  HR: 92 (28 Jun 2019 10:08) (63 - 110)  BP: 141/70 (28 Jun 2019 10:08) (130/77 - 176/99)  BP(mean): 86 (28 Jun 2019 10:08) (86 - 120)  RR: 24 (28 Jun 2019 10:08) (17 - 34)  SpO2: 96% (28 Jun 2019 10:08) (92% - 97%)    PHYSICAL EXAM:  Constitutional: Seated in bedside chair, appears comfortable and stated age  Respiratory: Breath sounds are clear bilaterally, nonlabored  Cardiovascular: Normal S1 and S2, regular rate and rhythm  Gastrointestinal: Bowel Sounds present, soft, nontender.   Psych: Appropriate mood and affect    LABS:   CARDIAC MARKERS ( 27 Jun 2019 13:41 ) 0.101 ng/mL / x     / x     / x     / x      CARDIAC MARKERS ( 27 Jun 2019 06:11 ) 0.155 ng/mL / x     / x     / x     / x      CARDIAC MARKERS ( 26 Jun 2019 22:16 ) 0.317 ng/mL / x     / x     / x     / x                         12.9   12.16 )-----------( 263      ( 28 Jun 2019 07:02 )             38.2     146<H>  |  112<H>  |  21  ----------------------------<  99  3.4<L>   |  23  |  0.96    Transthoracic Echocardiogram (06.26.19 @ 10:54):   Mild concentric left ventricular hypertrophy is present. Left ventricular wall motion appears hyperdynamic. Estimated left ventricular ejection fraction is 70 %.   The IVC appears minimally dilated and is collapsing with inspiration.   Lipomatous atrial septal wall noted.   Trace mitral regurgitation.   EA reversal of the mitral inflow consistent with reduced compliance of the left ventricle.   Moderate tricuspid valve regurgitation.   The estimated RVSP is 63 mmHg.    Tele: Sinus rhythm    CT Angio Chest PE Protocol w/ IV Cont (06.26.19 @ 12:42):  Extensive bilateral pulmonary embolism. Enlarged main pulmonary artery and straightening of the interventricular septum suggestive of right heart strain. Right upper lobe pulmonary infarct. Probable small left lower lobe pulmonary infarct.
REASON FOR VISIT: PE    HPI:  82 y/o man with a history of HTN, BPH, diverticulosis, prostate CA, admitted on 6/22/19 following a fall; found to be febrile and diagnosed with sepsis / pneumonia; hospitalization complicated by syncope, acute pulmonary embolism.    6/26/19   Events noted , patient did walk with physical therapy  patient did feel  sob , dizzy , hypoxic  unresponsive on the floor normal blood pressure  , patient was transferred to stepdown ccu , patient on oxygen ,  no sob at rest , patients echo showed elevated RVSP  6/27/19: Developed pulmonary embolus yesterday and started on lovenox. No CP .  Breathing reasonable.   6/28/19:  Feels "much better."  Occasional cough but no hemoptysis; mild dyspnea.  6/29/19:  Feels ok; worried about elevated BP; no headache, angina, SOB.  6/30/19:  Comfortable; no new complaints.    MEDICATIONS  (STANDING):  aspirin enteric coated 81 milliGRAM(s) Oral daily  cefepime  Injectable. 1000 milliGRAM(s) IV Push every 12 hours  doxazosin 4 milliGRAM(s) Oral at bedtime  enoxaparin Injectable 80 milliGRAM(s) SubCutaneous every 12 hours  furosemide   Injectable 20 milliGRAM(s) IV Push once  lidocaine   Patch 1 Patch Transdermal daily  metoprolol succinate ER 25 milliGRAM(s) Oral every 12 hours  valsartan 320 milliGRAM(s) Oral daily    Vital Signs Last 24 Hrs  T(C): 36.8 (30 Jun 2019 09:40), Max: 37.1 (29 Jun 2019 21:12)  T(F): 98.3 (30 Jun 2019 09:40), Max: 98.7 (29 Jun 2019 21:12)  HR: 93 (30 Jun 2019 09:46) (72 - 93)  BP: 152/85 (30 Jun 2019 09:46) (132/80 - 196/104)  BP(mean): 104 (30 Jun 2019 09:46) (94 - 132)  RR: 13 (30 Jun 2019 09:46) (13 - 27)  SpO2: 98% (30 Jun 2019 09:46) (92% - 100%)    PHYSICAL EXAM:  Constitutional: Seated in bedside chair  Respiratory: Breath sounds are clear bilaterally, nonlabored  Cardiovascular: Normal S1 and S2, regular rate and rhythm  Psych: Appropriate mood and affect    LABS:                   12.8   11.69 )-----------( 269      ( 29 Jun 2019 06:48 )             38.0     146<H>  |  114<H>  |  27<H>  ----------------------------<  106<H>  4.1   |  22  |  1.03    Transthoracic Echocardiogram (06.26.19 @ 10:54):   Mild concentric left ventricular hypertrophy is present. Left ventricular wall motion appears hyperdynamic. Estimated left ventricular ejection fraction is 70 %.   The IVC appears minimally dilated and is collapsing with inspiration.   Lipomatous atrial septal wall noted.   Trace mitral regurgitation.   EA reversal of the mitral inflow consistent with reduced compliance of the left ventricle.   Moderate tricuspid valve regurgitation.   The estimated RVSP is 63 mmHg.    Tele: Sinus rhythm    CT Angio Chest PE Protocol w/ IV Cont (06.26.19 @ 12:42):  Extensive bilateral pulmonary embolism. Enlarged main pulmonary artery and straightening of the interventricular septum suggestive of right heart strain. Right upper lobe pulmonary infarct. Probable small left lower lobe pulmonary infarct.
REASON FOR VISIT: PE    HPI:  84 y/o man with a history of HTN, BPH, diverticulosis, prostate CA, admitted on 6/22/19 following a fall; found to be febrile and diagnosed with sepsis / pneumonia; hospitalization complicated by syncope, acute pulmonary embolism.    6/26/19   Events noted , patient did walk with physical therapy  patient did feel  sob , dizzy , hypoxic  unresponsive on the floor normal blood pressure  , patient was transferred to stepdown ccu , patient on oxygen ,  no sob at rest , patients echo showed elevated RVSP  6/27/19: Developed pulmonary embolus yesterday and started on lovenox. No CP .  Breathing reasonable.   6/28/19:  Feels "much better."  Occasional cough but no hemoptysis; mild dyspnea.  6/29/19:  Feels ok; worried about elevated BP; no headache, angina, SOB.    MEDICATIONS  (STANDING):  aspirin enteric coated 81 milliGRAM(s) Oral daily  cefepime  Injectable. 1000 milliGRAM(s) IV Push every 12 hours  doxazosin 4 milliGRAM(s) Oral at bedtime  enoxaparin Injectable 80 milliGRAM(s) SubCutaneous every 12 hours  furosemide   Injectable 20 milliGRAM(s) IV Push once  metoprolol succinate ER 25 milliGRAM(s) Oral daily  valsartan 160 milliGRAM(s) Oral daily    Vital Signs Last 24 Hrs  T(C): 36.7 (29 Jun 2019 08:41), Max: 37.4 (28 Jun 2019 21:47)  T(F): 98.1 (29 Jun 2019 08:41), Max: 99.3 (28 Jun 2019 21:47)  HR: 83 (29 Jun 2019 09:00) (63 - 96)  BP: 158/89 (29 Jun 2019 09:00) (141/70 - 186/104)  BP(mean): 109 (29 Jun 2019 09:00) (86 - 125)  RR: 28 (29 Jun 2019 09:00) (15 - 30)  SpO2: 96% (29 Jun 2019 09:00) (92% - 98%)    PHYSICAL EXAM:  Constitutional: Seated in bedside chair, appears comfortable  Respiratory: Breath sounds are clear bilaterally, nonlabored  Cardiovascular: Normal S1 and S2, regular rate and rhythm; minimal pedal edema  Gastrointestinal: Bowel Sounds present, soft, nontender.   Psych: Appropriate mood and affect    LABS:   CARDIAC MARKERS ( 27 Jun 2019 13:41 ) 0.101 ng/mL / x     / x     / x     / x                   12.8   11.69 )-----------( 269      ( 29 Jun 2019 06:48 )             38.0     146<H>  |  114<H>  |  27<H>  ----------------------------<  106<H>  4.1   |  22  |  1.03    Ca    8.8      29 Jun 2019 06:48    Transthoracic Echocardiogram (06.26.19 @ 10:54):   Mild concentric left ventricular hypertrophy is present. Left ventricular wall motion appears hyperdynamic. Estimated left ventricular ejection fraction is 70 %.   The IVC appears minimally dilated and is collapsing with inspiration.   Lipomatous atrial septal wall noted.   Trace mitral regurgitation.   EA reversal of the mitral inflow consistent with reduced compliance of the left ventricle.   Moderate tricuspid valve regurgitation.   The estimated RVSP is 63 mmHg.    Tele: Sinus rhythm    CT Angio Chest PE Protocol w/ IV Cont (06.26.19 @ 12:42):  Extensive bilateral pulmonary embolism. Enlarged main pulmonary artery and straightening of the interventricular septum suggestive of right heart strain. Right upper lobe pulmonary infarct. Probable small left lower lobe pulmonary infarct.

## 2019-07-02 NOTE — DISCHARGE NOTE NURSING/CASE MANAGEMENT/SOCIAL WORK - NSDCDPATPORTLINK_GEN_ALL_CORE
You can access the [facility] Patient Portal, offered by [facility], by registering with the following website: [email or web address]

## 2019-07-02 NOTE — DISCHARGE NOTE PROVIDER - HOSPITAL COURSE
Pt is an 82 y/o male with PMHx of HTN, BPH, diverticulosis, prostate CA, tubular adenoma who presents to the ED via EMS after a recurrent fall this afternoon. Pts reports he fell at 6pm last night and again today at 1pm while he was trying to urinate in the bathroom.  He denies LOC and reports chills, extreme weakness  and fatigue for a few  days, also assoc  bifrontal mild headache, 1-2 loose stool diarrhea and decreased appetite/PO intake. Pt saw Dr. Nik Zimmerman at 10 am on day of presentation, and was prescribed cefuroxime for chest congestion and a sinus infection (06.22.19)            06/28/19: Patient seen and examined. SOB improving. No new complaints. Discussed with patient, wife and daughter at bed side regarding management and d/c plan.     06/29/19; Patient seen and examined. Sitting in a chair, he denies any sob or chest pain.     06/30/19: Patient seen and examined. He denies any sob or chest pain. BP was elevated in am, better now.     07/01/19; Patient seen and examined. No sob or chest pain. Discussed with 2 daughters, wife and patient regarding management and d/c plan.             Vital Signs Last 24 Hrs    T(C): 37.1 (01 Jul 2019 22:32), Max: 37.1 (01 Jul 2019 22:32)    T(F): 98.8 (01 Jul 2019 22:32), Max: 98.8 (01 Jul 2019 22:32)    HR: 87 (02 Jul 2019 12:00) (70 - 93)    BP: 146/88 (02 Jul 2019 12:00) (137/77 - 176/102)    BP(mean): 105 (02 Jul 2019 12:00) (94 - 122)    RR: 19 (02 Jul 2019 12:00) (17 - 24)    SpO2: 96% (02 Jul 2019 11:00) (90% - 96%)                PHYSICAL EXAM:        Constitutional: NAD, awake and alert, well-developed    HEENT: EOMI, Normal Hearing, MMM    Neck: Soft and supple, No LAD, questionable JVD    Respiratory: mild increased respiratory effort, crackles in RUL, + bibasilar crackles    Cardiovascular: S1 and S2, regular rate and rhythm, no Murmurs, gallops or rubs    Gastrointestinal: Bowel Sounds present, soft, nontender, nondistended, no guarding, no rebound    Extremities: No peripheral edema    Vascular: 2+ peripheral pulses    Neurological: A/O x 3, no focal deficits    Musculoskeletal: 5/5 strength b/l upper and lower extremities    Skin: No rashes                    Assessment and Plan:     · Assessment        83M w/ PMHx of HTN, prostate cancer, BPH, tubular adenoma, and diverticulosis who presented w/ recurrent falls admitted for sepsis secondary to PNA with hospital course complicated by PE during hospitalization         #Sepsis 2/2 PNA: resolved    - CT: RUL consolidation    -Completed IV cefepime    - Cristhian follow up appreciated        #B/L extensie PE-possibly acute on chr    -B/L DVT    - w/ mod-severe pulm HTN      -S/P lovenox 80 mg BID, changed to xarelto    -Pulmonary follow up appreciated    -Hypercoagulable work up-can be done as an outpatient    -Dr DAMION Bowie consult appreciated    -AC follow up appreciated        #Elevated troponins    - likely due to PE    -Demand ischemia    - cardio recs appreciated, no cath at this time        #HTN-better today    - continue toprol and diovan        #1.2cm incidental left renal lesion    - urology recs appreciated: f/u outpatient             D/C to rehab today    Spent more than 30 min to prepare the discharge.

## 2019-07-02 NOTE — PROVIDER CONTACT NOTE (OTHER) - DATE AND TIME:
02-Jul-2019 10:57
23-Jun-2019 12:04
23-Jun-2019 15:45
23-Jun-2019 17:42
24-Jun-2019 12:16
27-Jun-2019 17:49
28-Jun-2019 09:40
28-Jun-2019 10:16
29-Jun-2019 18:30
24-Jun-2019 07:15

## 2019-07-02 NOTE — DISCHARGE NOTE PROVIDER - CARE PROVIDER_API CALL
Claude Bowie)  Hematology; Internal Medicine; Medical Oncology  [address] [place] [address]  Phone: [phone]  Fax: [phone]  Follow Up Time:     Kacy Hernández)  Urology  [address] [place] [address]  Phone: [phone]  Fax: [phone]  Follow Up Time:     ZEE Chapin ()  Pulmonary Disease; Sleep Medicine  [address] [place] [address]  Phone: [phone]  Fax: [phone]  Follow Up Time:

## 2019-07-02 NOTE — PROVIDER CONTACT NOTE (OTHER) - SITUATION
TOM Ibrahim. He passed off info to Dr. Luque. TOM Luque gave him the info and he asked for me to put him in touch with Dr. Montero. TOM Montero and gave her his cell number.
Patient is going to rehab today and will follow up with physician
Spoke with  to inform him of consult
pt bp remains high despite po metoprolol. pt remains asymptomatic
pt continues to have temp at 1545. 650mg po tylenol given at 12:23 and patient does not have active order for medication
pt continues to have temp despite 325mg acetaminophen given at 1600. rectal temp is now 101. pt is otherwise asymptomatic. pt HH dropped, no ss of bleeding.
pt family expressed that they were not happy with [facility] services
pt with elevated + and DBP  without response to prn hydralazine. pt denies pain but notably more SOB when assisted back to bed. pt sats 90% after activity. audible wheezing noted.
spoke with Karlee at office to inform  [facility]
pt bp high and temp high throughout night. pt asymptomatic

## 2019-07-02 NOTE — PROVIDER CONTACT NOTE (OTHER) - NAME OF MD/NP/PA/DO NOTIFIED:
Consult called in for Dr. Ibrahim/ [facility].
Dr Hernández answering service message left
Dr Ryley Valladares
Dr Sheehan
Dr Tone Paulino (PCP)
Dr. Palla
Dr. Paulino
MD Bowie
MD Ferguson (admit phone) advised to call [facility] directly
Rehab
dr calixto
dr durga neely
dr durga neely

## 2019-07-08 DIAGNOSIS — R39.14 FEELING OF INCOMPLETE BLADDER EMPTYING: ICD-10-CM

## 2019-07-08 DIAGNOSIS — I12.9 HYPERTENSIVE CHRONIC KIDNEY DISEASE WITH STAGE 1 THROUGH STAGE 4 CHRONIC KIDNEY DISEASE, OR UNSPECIFIED CHRONIC KIDNEY DISEASE: ICD-10-CM

## 2019-07-08 DIAGNOSIS — M17.12 UNILATERAL PRIMARY OSTEOARTHRITIS, LEFT KNEE: ICD-10-CM

## 2019-07-08 DIAGNOSIS — N18.3 CHRONIC KIDNEY DISEASE, STAGE 3 (MODERATE): ICD-10-CM

## 2019-07-08 DIAGNOSIS — A41.9 SEPSIS, UNSPECIFIED ORGANISM: ICD-10-CM

## 2019-07-08 DIAGNOSIS — I26.09 OTHER PULMONARY EMBOLISM WITH ACUTE COR PULMONALE: ICD-10-CM

## 2019-07-08 DIAGNOSIS — N17.9 ACUTE KIDNEY FAILURE, UNSPECIFIED: ICD-10-CM

## 2019-07-08 DIAGNOSIS — I27.20 PULMONARY HYPERTENSION, UNSPECIFIED: ICD-10-CM

## 2019-07-08 DIAGNOSIS — Z87.891 PERSONAL HISTORY OF NICOTINE DEPENDENCE: ICD-10-CM

## 2019-07-08 DIAGNOSIS — I82.403 ACUTE EMBOLISM AND THROMBOSIS OF UNSPECIFIED DEEP VEINS OF LOWER EXTREMITY, BILATERAL: ICD-10-CM

## 2019-07-08 DIAGNOSIS — N28.1 CYST OF KIDNEY, ACQUIRED: ICD-10-CM

## 2019-07-08 DIAGNOSIS — J18.9 PNEUMONIA, UNSPECIFIED ORGANISM: ICD-10-CM

## 2019-07-08 DIAGNOSIS — C61 MALIGNANT NEOPLASM OF PROSTATE: ICD-10-CM

## 2019-07-08 DIAGNOSIS — J96.01 ACUTE RESPIRATORY FAILURE WITH HYPOXIA: ICD-10-CM

## 2019-07-08 DIAGNOSIS — Z79.82 LONG TERM (CURRENT) USE OF ASPIRIN: ICD-10-CM

## 2019-07-08 DIAGNOSIS — I24.8 OTHER FORMS OF ACUTE ISCHEMIC HEART DISEASE: ICD-10-CM

## 2019-07-08 DIAGNOSIS — J15.9 UNSPECIFIED BACTERIAL PNEUMONIA: ICD-10-CM

## 2019-07-08 DIAGNOSIS — Z79.899 OTHER LONG TERM (CURRENT) DRUG THERAPY: ICD-10-CM

## 2019-07-08 DIAGNOSIS — N40.1 BENIGN PROSTATIC HYPERPLASIA WITH LOWER URINARY TRACT SYMPTOMS: ICD-10-CM

## 2019-07-22 RX ORDER — RIVAROXABAN 15 MG-20MG
1 KIT ORAL
Qty: 30 | Refills: 0
Start: 2019-07-22 | End: 2019-08-20

## 2019-08-07 ENCOUNTER — NON-APPOINTMENT (OUTPATIENT)
Age: 84
End: 2019-08-07

## 2019-08-07 ENCOUNTER — RECORD ABSTRACTING (OUTPATIENT)
Age: 84
End: 2019-08-07

## 2019-08-07 ENCOUNTER — APPOINTMENT (OUTPATIENT)
Dept: CARDIOLOGY | Facility: CLINIC | Age: 84
End: 2019-08-07
Payer: MEDICARE

## 2019-08-07 VITALS — WEIGHT: 161 LBS | HEIGHT: 64 IN | BODY MASS INDEX: 27.49 KG/M2

## 2019-08-07 VITALS — DIASTOLIC BLOOD PRESSURE: 90 MMHG | SYSTOLIC BLOOD PRESSURE: 160 MMHG

## 2019-08-07 VITALS
RESPIRATION RATE: 14 BRPM | OXYGEN SATURATION: 96 % | TEMPERATURE: 98.1 F | SYSTOLIC BLOOD PRESSURE: 166 MMHG | HEART RATE: 66 BPM | DIASTOLIC BLOOD PRESSURE: 89 MMHG

## 2019-08-07 DIAGNOSIS — Z80.7 FAMILY HISTORY OF OTHER MALIGNANT NEOPLASMS OF LYMPHOID, HEMATOPOIETIC AND RELATED TISSUES: ICD-10-CM

## 2019-08-07 DIAGNOSIS — C61 MALIGNANT NEOPLASM OF PROSTATE: ICD-10-CM

## 2019-08-07 DIAGNOSIS — Z81.8 FAMILY HISTORY OF OTHER MENTAL AND BEHAVIORAL DISORDERS: ICD-10-CM

## 2019-08-07 DIAGNOSIS — I07.1 RHEUMATIC TRICUSPID INSUFFICIENCY: ICD-10-CM

## 2019-08-07 DIAGNOSIS — K57.90 DIVERTICULOSIS OF INTESTINE, PART UNSPECIFIED, W/OUT PERFORATION OR ABSCESS W/OUT BLEEDING: ICD-10-CM

## 2019-08-07 DIAGNOSIS — N40.0 BENIGN PROSTATIC HYPERPLASIA WITHOUT LOWER URINARY TRACT SYMPMS: ICD-10-CM

## 2019-08-07 PROCEDURE — 93000 ELECTROCARDIOGRAM COMPLETE: CPT

## 2019-08-07 PROCEDURE — 99214 OFFICE O/P EST MOD 30 MIN: CPT | Mod: 25

## 2019-08-07 RX ORDER — METOPROLOL SUCCINATE 25 MG/1
25 TABLET, EXTENDED RELEASE ORAL DAILY
Refills: 3 | Status: ACTIVE | COMMUNITY

## 2019-08-07 NOTE — REASON FOR VISIT
[Follow-Up - From Hospitalization] : follow-up of a recent hospitalization for [Syncope] : syncope [FreeTextEntry1] : pulmonary embolism, pulmonary hypertension

## 2019-08-07 NOTE — DISCUSSION/SUMMARY
[FreeTextEntry1] : \par Pulmonary embolism: Tolerating anticoagulation; continue Xarelto.\par \par Pulmonary hypertension: An echocardiogram during his hospitalization revealed tricuspid regurgitation and moderate/severe pulmonary hypertension; I recommend reevaluating pulmonary pressures with echocardiography following 3 months of anticoagulation -- I asked him to return in early October for imaging.\par \par Hypertension: Blood pressure moderately elevated during today's encounter (checked multiple times); however, he says that his blood pressure was much lower and "normal" during recent visits with his pulmonologist and primary care physician. His ECG suggests the presence of LVH.  I offered to have him return for reevaluation of blood pressure but he prefers to check blood pressure at home on his ambulatory blood pressure cuff -- he will contact me if pressures are elevated.\par

## 2019-08-07 NOTE — PHYSICAL EXAM
[Well Groomed] : well groomed [General Appearance - In No Acute Distress] : no acute distress [Eyelids - No Xanthelasma] : the eyelids demonstrated no xanthelasmas [No Oral Cyanosis] : no oral cyanosis [] : no respiratory distress [Heart Rate And Rhythm] : heart rate and rhythm were normal [Respiration, Rhythm And Depth] : normal respiratory rhythm and effort [Heart Sounds] : normal S1 and S2 [Edema] : no peripheral edema present [Bowel Sounds] : normal bowel sounds [Abdomen Soft] : soft [Abdomen Tenderness] : non-tender [FreeTextEntry1] : Abdomen is obese [Abnormal Walk] : normal gait [Cyanosis, Localized] : no localized cyanosis [Nail Clubbing] : no clubbing of the fingernails [Skin Color & Pigmentation] : normal skin color and pigmentation [Oriented To Time, Place, And Person] : oriented to person, place, and time [Impaired Insight] : insight and judgment were intact [Affect] : the affect was normal [Mood] : the mood was normal

## 2019-08-07 NOTE — HISTORY OF PRESENT ILLNESS
[FreeTextEntry1] : Aime Mariee is an 82 y/o man with a history of HTN, BPH, diverticulosis, and prostate cancer who was admitted to [facility] on 6/22/19 following a fall; he was found to be febrile and diagnosed with sepsis / pneumonia with hospitalization complicated by syncope and an acute pulmonary embolism.  He clinically recovered and was ultimately discharged to a rehabilitation facility. He has been feeling well "almost" back to normal.  He has not been experiencing angina, dyspnea, or cough. He has no history of heart disease.  He has been tolerating anticoagulation.\par

## 2019-10-10 ENCOUNTER — APPOINTMENT (OUTPATIENT)
Dept: CARDIOLOGY | Facility: CLINIC | Age: 84
End: 2019-10-10
Payer: MEDICARE

## 2019-10-10 PROCEDURE — 93306 TTE W/DOPPLER COMPLETE: CPT

## 2019-10-11 ENCOUNTER — MEDICATION RENEWAL (OUTPATIENT)
Age: 84
End: 2019-10-11

## 2019-10-11 ENCOUNTER — CLINICAL ADVICE (OUTPATIENT)
Age: 84
End: 2019-10-11

## 2019-10-25 ENCOUNTER — APPOINTMENT (OUTPATIENT)
Dept: CARDIOLOGY | Facility: CLINIC | Age: 84
End: 2019-10-25
Payer: MEDICARE

## 2019-10-25 ENCOUNTER — NON-APPOINTMENT (OUTPATIENT)
Age: 84
End: 2019-10-25

## 2019-10-25 VITALS
DIASTOLIC BLOOD PRESSURE: 80 MMHG | HEIGHT: 64 IN | WEIGHT: 160 LBS | BODY MASS INDEX: 27.31 KG/M2 | SYSTOLIC BLOOD PRESSURE: 160 MMHG | OXYGEN SATURATION: 94 % | HEART RATE: 74 BPM

## 2019-10-25 VITALS — SYSTOLIC BLOOD PRESSURE: 148 MMHG | DIASTOLIC BLOOD PRESSURE: 84 MMHG

## 2019-10-25 PROCEDURE — 93000 ELECTROCARDIOGRAM COMPLETE: CPT

## 2019-10-25 PROCEDURE — 99214 OFFICE O/P EST MOD 30 MIN: CPT

## 2019-10-25 RX ORDER — CICLOPIROX OLAMINE 7.7 MG/G
CREAM TOPICAL TWICE DAILY
Refills: 0 | Status: DISCONTINUED | COMMUNITY
End: 2019-10-25

## 2019-10-25 RX ORDER — OMEPRAZOLE 20 MG/1
20 CAPSULE, DELAYED RELEASE ORAL
Refills: 2 | Status: DISCONTINUED | COMMUNITY
End: 2019-10-25

## 2019-10-25 NOTE — REVIEW OF SYSTEMS
[Feeling Fatigued] : feeling fatigued [Eyeglasses] : currently wearing eyeglasses [Joint Pain] : joint pain [Lower Back Pain] : lower back pain [Negative] : Integumentary [Headache] : no headache [Shortness Of Breath] : no shortness of breath [Chest  Pressure] : no chest pressure

## 2019-10-25 NOTE — HISTORY OF PRESENT ILLNESS
[FreeTextEntry1] : Aime Mariee is an 84 y/o man with a history of HTN, BPH, diverticulosis, prostate cancer, and recent hospitalization with sepsis, pneumonia, and acute pulmonary embolism (June 2019).  Recent echocardiography demonstrated a moderate enlargement of the ascending thoracic aorta and blood pressure was markedly elevated at the time of imaging (170/80); as a result I increased the dose of his valsartan-HCTZ to 320/25 mg on October 11, 2019 - he now returns to the reassessment of blood pressure and titration of medications.  He continues to feel well and offers no new complaints. He says that he is "trying" to decrease his dietary intake of sodium. He denies chest discomfort, dyspnea.\par

## 2019-10-25 NOTE — DISCUSSION/SUMMARY
[___ Month(s)] : [unfilled] month(s) [With Me] : with me [FreeTextEntry1] : \par Hypertension: Blood pressure has improved but is suboptimally controlled (especially in the setting of a dilated thoracic aorta) -- I recommend continuing metoprolol and valsartan-HCTZ; add amlodipine 5 mg daily.  I verified accuracy of his home blood pressure monitor - he will continue to monitor blood pressure at home and contact me soon to discuss measurements with initiation of amlodipine.\par \par Pulmonary embolism: Tolerating anticoagulation; continue Xarelto.\par \par Pulmonary hypertension: Marked improvement in estimated pulmonary artery systolic pressure (normal on recent TTE).\par \par Dilated thoracic aorta: Moderate in size; will continue efforts to optimize blood pressure - anticipate periodic monitoring of aortic dimensions.

## 2019-10-25 NOTE — REASON FOR VISIT
[Follow-Up - Clinic] : a clinic follow-up of [Hypertension] : hypertension [Medication Management] : Medication management [FreeTextEntry1] : pulmonary embolism, pulmonary hypertension, dilated aorta

## 2019-10-25 NOTE — PHYSICAL EXAM
[Well Groomed] : well groomed [No Oral Cyanosis] : no oral cyanosis [] : no respiratory distress [Respiration, Rhythm And Depth] : normal respiratory rhythm and effort [Heart Rate And Rhythm] : heart rate and rhythm were normal [Heart Sounds] : normal S1 and S2 [Edema] : no peripheral edema present [Nail Clubbing] : no clubbing of the fingernails [Cyanosis, Localized] : no localized cyanosis [Skin Color & Pigmentation] : normal skin color and pigmentation [Oriented To Time, Place, And Person] : oriented to person, place, and time [Impaired Insight] : insight and judgment were intact [Affect] : the affect was normal [Mood] : the mood was normal [Normal Appearance] : normal appearance

## 2019-12-06 NOTE — PROVIDER CONTACT NOTE (CHANGE IN STATUS NOTIFICATION) - SITUATION
Pt Lovenox was given at 2 am and next dose due at 1800 - I was agreed that pt should have the 1800 dose given at 16:30 and then the next dose will be given at 6am as regulary scheduled. PT requests to get oob - pending doppler
Pt was ambulating in hallway with PT. Pt unresponsive for10 seconds. Assisted into chair. Dr. Mcpherson called and came to floor. Vitals checked. Hypoxic. Pt placed on 100% non-rebreather.
5

## 2020-01-08 NOTE — PROGRESS NOTE ADULT - PROBLEM SELECTOR PROBLEM 1
Pulmonary embolism
Pulmonary embolism
SOB (shortness of breath)
SOB (shortness of breath)
Pulmonary embolism
Spiral Flap Text: The defect edges were debeveled with a #15 scalpel blade.  Given the location of the defect, shape of the defect and the proximity to free margins a spiral flap was deemed most appropriate.  Using a sterile surgical marker, an appropriate rotation flap was drawn incorporating the defect and placing the expected incisions within the relaxed skin tension lines where possible. The area thus outlined was incised deep to adipose tissue with a #15 scalpel blade.  The skin margins were undermined to an appropriate distance in all directions utilizing iris scissors.

## 2020-02-05 ENCOUNTER — NON-APPOINTMENT (OUTPATIENT)
Age: 85
End: 2020-02-05

## 2020-02-05 ENCOUNTER — APPOINTMENT (OUTPATIENT)
Dept: CARDIOLOGY | Facility: CLINIC | Age: 85
End: 2020-02-05
Payer: MEDICARE

## 2020-02-05 VITALS
HEIGHT: 64 IN | SYSTOLIC BLOOD PRESSURE: 142 MMHG | OXYGEN SATURATION: 94 % | BODY MASS INDEX: 28.17 KG/M2 | DIASTOLIC BLOOD PRESSURE: 87 MMHG | WEIGHT: 165 LBS | HEART RATE: 78 BPM

## 2020-02-05 PROCEDURE — 99214 OFFICE O/P EST MOD 30 MIN: CPT

## 2020-02-05 PROCEDURE — 93000 ELECTROCARDIOGRAM COMPLETE: CPT

## 2020-02-05 NOTE — HISTORY OF PRESENT ILLNESS
[FreeTextEntry1] : This is an 84 y/o Gentleman PMH: HTN, BPH, diverticulosis, prostate cancer, sepsis, pneumonia,pulmonary embolism (June 2019), Dilated AO who presents today in routine cardiac F/U \par \par Claims to be feeling well no CP/SOB/Palpitations/Dizziness/Syncope \par

## 2020-02-05 NOTE — REVIEW OF SYSTEMS
[Lower Back Pain] : lower back pain [Negative] : Heme/Lymph [Headache] : no headache [Shortness Of Breath] : no shortness of breath [Chest  Pressure] : no chest pressure

## 2020-02-05 NOTE — REASON FOR VISIT
[Follow-Up - Clinic] : a clinic follow-up of [Medication Management] : Medication management [Hypertension] : hypertension [FreeTextEntry1] : pulmonary embolism, pulmonary hypertension, dilated aorta

## 2020-02-05 NOTE — PHYSICAL EXAM
[General Appearance - Well Developed] : well developed [Normal Appearance] : normal appearance [Well Groomed] : well groomed [General Appearance - Well Nourished] : well nourished [No Deformities] : no deformities [Normal Conjunctiva] : the conjunctiva exhibited no abnormalities [General Appearance - In No Acute Distress] : no acute distress [] : no respiratory distress [Respiration, Rhythm And Depth] : normal respiratory rhythm and effort [Exaggerated Use Of Accessory Muscles For Inspiration] : no accessory muscle use [Heart Rate And Rhythm] : heart rate and rhythm were normal [Auscultation Breath Sounds / Voice Sounds] : lungs were clear to auscultation bilaterally [Heart Sounds] : normal S1 and S2 [Abdomen Soft] : soft [Abnormal Walk] : normal gait [FreeTextEntry1] : No LE Edema no calf tenderness  [Skin Color & Pigmentation] : normal skin color and pigmentation [Oriented To Time, Place, And Person] : oriented to person, place, and time

## 2020-02-05 NOTE — DISCUSSION/SUMMARY
[___ Month(s)] : [unfilled] month(s) [With Me] : with me [FreeTextEntry1] : \par Hypertension: Adequately controlled on present medications  \par \par Pulmonary embolism: Tolerating anticoagulation; continue Xarelto.\par \par Pulmonary hypertension: Marked improvement in estimated pulmonary artery systolic pressure (normal on recent TTE).  Continue Pulmonary management \par \par Dilated AO: Echo reviewed from 10/2019 AO 4.0, Ascending aorta 4.8 CM in diameter thoracic aorta will continue efforts to optimize blood pressure - anticipate periodic monitoring of aortic dimensions.  Reports CT Chest with Pulmonary Approx 2 months ago.  Will obtain copy for review \par \par Prior CT reviewed revealing incidental renal lesions which he relates he is currently under care of Urology and small Hiatal hernia, he will follow with PCP for further work up.  Copy of scan provided to patient and faxed to PCP Dr Paulino. \par \par OV 3 months

## 2020-03-16 ENCOUNTER — RX RENEWAL (OUTPATIENT)
Age: 85
End: 2020-03-16

## 2020-03-19 ENCOUNTER — RX RENEWAL (OUTPATIENT)
Age: 85
End: 2020-03-19

## 2020-06-24 ENCOUNTER — NON-APPOINTMENT (OUTPATIENT)
Age: 85
End: 2020-06-24

## 2020-06-24 ENCOUNTER — APPOINTMENT (OUTPATIENT)
Dept: CARDIOLOGY | Facility: CLINIC | Age: 85
End: 2020-06-24
Payer: MEDICARE

## 2020-06-24 VITALS
HEIGHT: 64 IN | WEIGHT: 164 LBS | DIASTOLIC BLOOD PRESSURE: 98 MMHG | BODY MASS INDEX: 28 KG/M2 | OXYGEN SATURATION: 97 % | SYSTOLIC BLOOD PRESSURE: 140 MMHG | HEART RATE: 80 BPM

## 2020-06-24 VITALS — DIASTOLIC BLOOD PRESSURE: 88 MMHG | SYSTOLIC BLOOD PRESSURE: 146 MMHG

## 2020-06-24 DIAGNOSIS — I27.20 PULMONARY HYPERTENSION, UNSPECIFIED: ICD-10-CM

## 2020-06-24 PROCEDURE — 99214 OFFICE O/P EST MOD 30 MIN: CPT

## 2020-06-24 PROCEDURE — 93000 ELECTROCARDIOGRAM COMPLETE: CPT

## 2020-06-24 RX ORDER — AMLODIPINE BESYLATE 5 MG/1
5 TABLET ORAL DAILY
Qty: 90 | Refills: 3 | Status: DISCONTINUED | COMMUNITY
Start: 2019-10-25 | End: 2020-06-24

## 2020-06-24 NOTE — DISCUSSION/SUMMARY
[___ Month(s)] : [unfilled] month(s) [With Me] : with me [FreeTextEntry1] : \par Hypertension: Suboptimally controlled; he did not tolerate amlodipine but is willing to try nifedipine; continue metoprolol and valsartan-HCTZ.  He says that his blood pressure be rechecked at upcoming visit with his pulmonologist and primary care physician - he will contact me if elevated and return to the office sooner for followup.\par \par Pulmonary embolism: Tolerating Xarelto.\par \par Pulmonary hypertension: Improved.\par \par Dilated thoracic aorta: Moderate in size; CT ordered by his pulmonologist as scheduled  - he will forward me a copy of report when available.\par

## 2020-06-24 NOTE — REVIEW OF SYSTEMS
[Eyeglasses] : currently wearing eyeglasses [Joint Pain] : joint pain [Lower Back Pain] : lower back pain [Negative] : Integumentary [Recent Weight Loss (___ Lbs)] : no recent weight loss [Shortness Of Breath] : no shortness of breath [Chest  Pressure] : no chest pressure [Chest Pain] : no chest pain

## 2020-06-24 NOTE — PHYSICAL EXAM
[] : no respiratory distress [Respiration, Rhythm And Depth] : normal respiratory rhythm and effort [Heart Sounds] : normal S1 and S2 [Heart Rate And Rhythm] : heart rate and rhythm were normal [Nail Clubbing] : no clubbing of the fingernails [Edema] : no peripheral edema present [Skin Color & Pigmentation] : normal skin color and pigmentation [Cyanosis, Localized] : no localized cyanosis [Affect] : the affect was normal [Impaired Insight] : insight and judgment were intact [Oriented To Time, Place, And Person] : oriented to person, place, and time [Mood] : the mood was normal [Well Groomed] : well groomed [Eyelids - No Xanthelasma] : the eyelids demonstrated no xanthelasmas [FreeTextEntry1] : Overweight [General Appearance - In No Acute Distress] : no acute distress [Abnormal Walk] : normal gait

## 2020-06-24 NOTE — HISTORY OF PRESENT ILLNESS
[FreeTextEntry1] : Aime Mariee is an 84 year old man with a history of HTN, BPH, diverticulosis, prostate cancer, hospitalization for sepsis, pneumonia, and acute pulmonary embolism (June 2019), and dilated ascending thoracic aorta.  He has been feeling well and offers no new complaints during today's encounter.  He describes upcoming visits with his pulmonologist and is scheduled for CT imaging of the chest; also plans to see his primary care physician in the upcoming months.  He denies angina, dyspnea, palpitations.  He complains of bilateral knee pain and anticipate a joint replacement surgery in the fall.\par \par

## 2020-09-21 ENCOUNTER — RX RENEWAL (OUTPATIENT)
Age: 85
End: 2020-09-21

## 2020-09-22 NOTE — H&P ADULT - ENMT
[FreeTextEntry1] : Doing reasonably given that she is almost 89 years old.  Last years echo shows some progression in AV disease with moderate to severe AS by echo parameters. She states though she has no SOB or BARNETT (not as active as he used to be by her report). Denies chest discomfort, lower extremity edema, palpitations, paroxysmal nocturnal dyspnea, presyncope or syncope.  detailed exam mouth/nose/pharynx

## 2020-10-21 NOTE — ED PROVIDER NOTE - PSH
Orthopedic and Spine Patients: Instructions on When You Can   Eat or Drink Before Surgery      You have been provided 2 pre-surgery drinks received at your pre-admission testing appointment.  Night before surgery:  o You should drink one pre-surgery drink at bedtime. No food after midnight!  Day of Surgery:  o Complete 2nd pre-surgery drink 1 hour prior to arrival at hospital.  For questions call Pre-Admission Testing at [phone]. They are available from 8:00am-5:00pm, Monday through Friday. History of rhinoplasty    History of tonsillectomy    Mastoid disorder    Prostate ca  with seed implantation 1/2004  S/P cataract surgery  left eye

## 2020-12-22 ENCOUNTER — RX RENEWAL (OUTPATIENT)
Age: 85
End: 2020-12-22

## 2021-01-10 NOTE — PROGRESS NOTE ADULT - PROBLEM SELECTOR PLAN 5
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 He presents with shortness of breath, found to have a pericardial effusion.     Today:   - Patient uncomfortable with drip, transition to Bumex 3 mg IV TID  - Continue home antihypertensives today, readdress in AM. With help from nephrology, consider adjustments to home regimen tomorrow (increase chlorthalidone vs add additional oral agent, etc)     Shortness of Breath  Pericardial Effusion   Diastolic Dysfunction  Moderate Pulmonary Hypertension  CKD IV-V  Patient presents with one week history of shortness of breath. No CP, non-ischemic EKG, neg troponin. TTE with IVC >2.1 cm, moderate pericardial effusion without tamponade, also new moderate pulmonary hypertension and diastolic dysfunction. Patient has no prior cardiac history. NT BNP 60348. Received IV lasix 100 mg on 1/7 with improvement. Endorses diuretic non-compliance. Likely due to hypervolemia secondary to CKD and diastolic dysfunction. Uremic pericarditis also considered.  - No indication for pericardiocentesis at this time  - Stop bumex gtt, start Bumex 3 mg IV TID  - BMP, Mg BID. Maintain K>4, Mg>2  - Strict I&O, daily weight  - Fluid <2L, Na<2g  - Cardiology signed off  - Nephrology following   - Hold PTA Bumex 2 mg BID while on Bumex gtt      Cough  C/f Community Acquired Infection   Patient has one week history of cough and one episode of chills. CRP 80, , Procal 4. Possibly in the setting of volume overload above. Covid negative. [place] [facility] [place]. Also reports history of MRSA infection.   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 DSA+ 8/2019. 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 Cr on admission 4.88. Baseline Cr 3.5-3.8, has been trending up c/f worsening kidney function due to progression of disease. Referred for ESRD planning and repeat transplant evaluation.  - PTA Tacrolimus (goal 4-6)   - PTA Mycophenolic acid (goal 1-3.5)  - PTA Prednisone 5 mg every day  - No acute indication for dialysis  - Tacrolimus level in AM     Hypertension  Poor control. Goal <130/80.   - Hydralazine 10 mg PRN for SBP>160  - Labetalol 10 mg PRN x3 for SBP>160   - PTA clonidine 0.2 mg BID  - PTA diltiazem 240 mg qd  - PTA chlorthalidone 25 mg every day  - Hold PTA losartan 50 mg BID  - Resume PTA bumex 2 mg BID     Anemia of Chronic Renal Disease  On admission 7. Baseline 8-9. No evidence of bleeding.  Iron studies 10/30 with normal iron, elevated ferritin, consistent with anemia of chronic disease. Previously receiving EPO injections for Hgb <10 every 7 days, but discontinued recently as he did not  a refill of the medication.  - check EPO level  - ferrous sulfate 325 mg q other day     Diet: Fluid restriction 2000 ML FLUID  2 Gram Sodium Diet    Fluids: None  Lines: PIV  DVT Prophylaxis: Heparin SQ  Julio Catheter: not present  Code Status: Full Code           Disposition Plan   Expected discharge: 2 - 3 days, recommended to prior living arrangement once volume optimized.  Entered: Alexi Schultz MD 01/10/2021, 7:50 AM       The patient's care was discussed with the Dr. Abreu.    Thien Mulligan MD  [address]   Please see sign in/sign out for up to date coverage information      ______________________________________________________________________    Interval History   Overnight BP remained  elevated 160-180/50-90s. Received hydralazine 30 mg home antihypertensives with limited improvement.     Reports chest discomfort with cough. Feels congested. Coughs up occasional sputum.     Data reviewed today: I reviewed all medications, new labs and imaging results over the last 24 hours.     Physical Exam   Vital Signs: Temp: 98.1  F (36.7  C) Temp src: Oral BP: (!) 166/55 Pulse: 76   Resp: 18 SpO2: 97 % O2 Device: None (Room air)    Weight: 192 lbs 0 oz  Gen: well-appearing, lying in bed with HOB elevated, in NAD  CV: RRR, normal S1 and S2, JVD to jaw  Pulm: CTAB, no increased WOB on RA  GI: soft, NT, ND.    : no suprapubic tenderness  Neuro: alert, conversant, responds to questions appropriately  Ext: no LE edema.  Psych: normal affect    Data   Recent Labs   Lab 01/10/21  0632 01/09/21  1715 01/09/21  0552 01/08/21  1815 01/08/21  1149 01/07/21  1157   WBC 4.3  --  5.9  --  6.9 9.5   HGB 7.5*  --  7.7*  --  7.0* 7.0*   MCV 89  --  87  --  90 90     --  215  --  173 161     --  139 137 138 137   POTASSIUM 4.3 4.5 4.2 4.7 4.3 4.5   CHLORIDE 111*  --  111* 110* 112* 113*   CO2 17*  --  15* 15* 16* 14*   BUN 78*  --  73* 71* 72* 69*   CR 4.77*  --  4.64* 4.73* 4.88* 4.47*   ANIONGAP 13  --  12 12 10 10   MEERA 7.7*  --  8.2* 7.8* 7.9* 7.5*   GLC 75  --  89 279* 168* 152*   ALBUMIN  --   --   --   --  3.1* 3.2*   PROTTOTAL  --   --   --   --  6.4* 6.5*   BILITOTAL  --   --   --   --  0.2 0.3   ALKPHOS  --   --   --   --  55 59   ALT  --   --   --   --  13 14   AST  --   --   --   --  12 7   TROPI  --   --   --   --  <0.015 0.015     No results found for this or any previous visit (from the past 24 hour(s)).   secondary pulmonary hypertension , appears to have barrel chest /copd  pneumonia , possible uncontrolled hypertension induced diastolic dysfunction ,   would recommend pulmonary evaluation oxygen , bp control, ct ANGIO

## 2021-04-20 NOTE — PROCEDURAL SAFETY CHECKLIST WITH OR WITHOUT SEDATION - NSPREPROCFT_GEN_ALL_CORE
Left knee steroid injection daily, Disp: 90 capsule, Rfl: 1    atorvastatin (LIPITOR) 40 MG tablet, Take 1 tablet by mouth daily, Disp: 30 tablet, Rfl: 5    levothyroxine (SYNTHROID) 112 MCG tablet, Take 1 tablet by mouth daily, Disp: 30 tablet, Rfl: 5    Handicap Placard MISC, by Does not apply route, Disp: 1 each, Rfl: 0    PREMARIN 0.625 MG/GM vaginal cream, PLACE VAGINALLY ONCE A WEEK AS DIRECTED., Disp: 30 g, Rfl: 5    zolpidem (AMBIEN) 5 MG tablet, take 1 tablet by mouth at bedtime if needed, Disp: 30 tablet, Rfl: 0    naproxen (NAPROSYN) 500 MG tablet, , Disp: , Rfl:     ACCU-CHEK ZENIA PLUS strip, TEST 2 TIMES A DAY AND AS NEEDED FOR SYMPTOMS OF IRREGULAR BLOOD GLUCOSE., Disp: 300 strip, Rfl: 2    hydroxychloroquine (PLAQUENIL) 200 MG tablet, TAKE 1 TABLET EVERY DAY, Disp: 90 tablet, Rfl: 5    fluticasone (FLONASE) 50 MCG/ACT nasal spray, USE 1 SPRAY IN EACH NOSTRIL EVERY DAY, Disp: 32 g, Rfl: 5    VENTOLIN  (90 Base) MCG/ACT inhaler, Inhale 2 puffs into the lungs 4 times daily, Disp: 1 Inhaler, Rfl: 1    potassium chloride (KLOR-CON) 10 MEQ extended release tablet, TAKE 1 TABLET TWICE DAILY, Disp: 180 tablet, Rfl: 2    busPIRone (BUSPAR) 15 MG tablet, Take 15 mg by mouth 3 times daily, Disp: 270 tablet, Rfl: 3    blood glucose monitor kit and supplies, Olga t2 times a day & as needed for symptoms of irregular blood glucose., Disp: 1 kit, Rfl: 0    fluconazole (DIFLUCAN) 100 MG tablet, Take 1 tablet by mouth three times a week, Disp: 90 tablet, Rfl: 2    losartan-hydroCHLOROthiazide (HYZAAR) 100-12.5 MG per tablet, TAKE 1 TABLET EVERY DAY, Disp: 90 tablet, Rfl: 1    montelukast (SINGULAIR) 10 MG tablet, TAKE 1 TABLET EVERY NIGHT, Disp: 90 tablet, Rfl: 3    methocarbamol (ROBAXIN) 750 MG tablet, TAKE 1 TABLET THREE TIMES DAILY, Disp: 270 tablet, Rfl: 1    omeprazole (PRILOSEC) 40 MG delayed release capsule, TAKE 1 CAPSULE TWICE DAILY, Disp: 180 capsule, Rfl: 3    Blood Glucose Calibration (ACCU-CHEK ZENIA) SOLN, redness. Neck:  Supple without adenopathy. Adequate range of motion   Resp: No rales, rhonchi, wheezes appreciated over the lungs bilaterally. CV: S1, S2 within normal limits. Regular rate and rhythm noted. Without murmur, gallop or rub. Extremities:  Pulses intact. Without noted edema. Abdomen: Positive bowel sounds. Palpation reveals softness, with no distension, organomegaly or tenderness. No abdominal masses palpable. Skin: Skin is warm and dry. Musculo: Unchanged upon examination. Neuro: Alert and oriented X3. Cranial nerves grossly intact. Psych: Mood is normal.  Affect is normal.   Vital signs reviewed. Controlled Substances Monitoring:     No flowsheet data found. Plan Per Assessment:  Gila was seen today for anxiety, diabetes, hypertension, hyperlipidemia and hypothyroidism. Diagnoses and all orders for this visit:    Essential hypertension    Hypothyroidism, unspecified type    Type 2 diabetes mellitus without complication, without long-term current use of insulin (HCC)    Anxiety    Hyperlipidemia, unspecified hyperlipidemia type    Other orders  -     spironolactone (ALDACTONE) 25 MG tablet; Take 1 tablet by mouth 2 times daily  -     tolterodine (DETROL LA) 2 MG extended release capsule; take 1 capsule by mouth once daily  -     atorvastatin (LIPITOR) 40 MG tablet; Take 1 tablet by mouth daily  -     levothyroxine (SYNTHROID) 112 MCG tablet; Take 1 tablet by mouth daily  -     Handicap Placard MISC; by Does not apply route        Return in about 3 months (around 7/20/2021) for MEDICATION CHECK, FOLLOW UP CHRONIC MEDICAL PROBLEMS. Bella Mcneal MD    Note was generated with the assistance of voice recognition software. Document was reviewed however may contain grammatical errors.

## 2021-05-21 ENCOUNTER — APPOINTMENT (OUTPATIENT)
Dept: CARDIOLOGY | Facility: CLINIC | Age: 86
End: 2021-05-21

## 2021-06-18 ENCOUNTER — NON-APPOINTMENT (OUTPATIENT)
Age: 86
End: 2021-06-18

## 2021-06-18 ENCOUNTER — APPOINTMENT (OUTPATIENT)
Dept: CARDIOLOGY | Facility: CLINIC | Age: 86
End: 2021-06-18
Payer: MEDICARE

## 2021-06-18 VITALS — WEIGHT: 164 LBS | HEIGHT: 64 IN | BODY MASS INDEX: 28 KG/M2

## 2021-06-18 VITALS — DIASTOLIC BLOOD PRESSURE: 97 MMHG | HEART RATE: 60 BPM | SYSTOLIC BLOOD PRESSURE: 190 MMHG | OXYGEN SATURATION: 95 %

## 2021-06-18 VITALS — DIASTOLIC BLOOD PRESSURE: 96 MMHG | SYSTOLIC BLOOD PRESSURE: 178 MMHG

## 2021-06-18 VITALS — SYSTOLIC BLOOD PRESSURE: 170 MMHG | DIASTOLIC BLOOD PRESSURE: 94 MMHG

## 2021-06-18 PROCEDURE — 93000 ELECTROCARDIOGRAM COMPLETE: CPT

## 2021-06-18 PROCEDURE — 99214 OFFICE O/P EST MOD 30 MIN: CPT

## 2021-06-18 RX ORDER — DOXAZOSIN 4 MG/1
4 TABLET ORAL DAILY
Qty: 90 | Refills: 2 | Status: ACTIVE | COMMUNITY

## 2021-06-18 NOTE — PHYSICAL EXAM
[Well Groomed] : well groomed [General Appearance - In No Acute Distress] : no acute distress [] : no respiratory distress [Respiration, Rhythm And Depth] : normal respiratory rhythm and effort [Heart Rate And Rhythm] : heart rate and rhythm were normal [Heart Sounds] : normal S1 and S2 [Edema] : no peripheral edema present [Abnormal Walk] : normal gait [Nail Clubbing] : no clubbing of the fingernails [Cyanosis, Localized] : no localized cyanosis [Skin Color & Pigmentation] : normal skin color and pigmentation [Oriented To Time, Place, And Person] : oriented to person, place, and time [Impaired Insight] : insight and judgment were intact [Affect] : the affect was normal [Mood] : the mood was normal [FreeTextEntry1] : Overweight

## 2021-06-18 NOTE — HISTORY OF PRESENT ILLNESS
[FreeTextEntry1] : Aime Mariee is an 85 year old man with a history of HTN, BPH, diverticulosis, prostate cancer, hospitalization for sepsis, pneumonia, and acute pulmonary embolism (June 2019), and dilated ascending thoracic aorta for cardiac examination - our last encounter was in June 2020. At that time his blood pressure was suboptimally controlled and I prescribed nifedipine ER 30 mg daily -- did not start taking it.  He says that he recently felt mildly lightheaded and saw his primary care physician who found him to be hypotensive; subsequently decreased his dose of valsartan-HCTZ in half. He is now concerned about the potential for suboptimally controlled hypertension. He describes multiple stressors (multiple ill family members).  However, he feels "pretty good" at this time -- no headache, chest pain, palpitations, or dyspnea.\par

## 2021-06-18 NOTE — REVIEW OF SYSTEMS
[Anxiety] : anxiety [Under Stress] : under stress [Headache] : no headache [Feeling Fatigued] : not feeling fatigued [SOB] : no shortness of breath [Dyspnea on exertion] : not dyspnea during exertion [Palpitations] : no palpitations [Syncope] : no syncope

## 2021-06-18 NOTE — DISCUSSION/SUMMARY
[With Me] : with me [___ Week(s)] : in [unfilled] week(s) [FreeTextEntry1] : \par Hypertension: Blood pressure is markedly elevated today, consistent with poor control -- unclear why he was recently hypotensive; I recommend resuming a full tablet of valsartan-HCTZ 320-25 mg daily. I asked him to return to see me in a few weeks to reassess blood pressure and titrate medications as needed.\par \par Thoracic aortic aneurysm: Stable dimensions; imperative that we optimize blood pressure.\par \par Pulmonary embolism: History of severe pulmonary embolism - he is anticoagulated (duration of anticoagulation defer to primary care physician).\par \par

## 2021-07-23 ENCOUNTER — APPOINTMENT (OUTPATIENT)
Dept: CARDIOLOGY | Facility: CLINIC | Age: 86
End: 2021-07-23
Payer: MEDICARE

## 2021-07-23 VITALS
BODY MASS INDEX: 32.39 KG/M2 | HEIGHT: 60 IN | DIASTOLIC BLOOD PRESSURE: 82 MMHG | HEART RATE: 82 BPM | WEIGHT: 165 LBS | SYSTOLIC BLOOD PRESSURE: 144 MMHG | OXYGEN SATURATION: 97 %

## 2021-07-23 PROCEDURE — 99214 OFFICE O/P EST MOD 30 MIN: CPT

## 2021-07-23 NOTE — REVIEW OF SYSTEMS
[Anxiety] : anxiety [Under Stress] : under stress [Headache] : no headache [Feeling Fatigued] : not feeling fatigued [SOB] : no shortness of breath [Dyspnea on exertion] : not dyspnea during exertion [Palpitations] : no palpitations [Syncope] : no syncope [Dizziness] : no dizziness

## 2021-07-23 NOTE — REASON FOR VISIT
[Follow-Up - Clinic] : a clinic follow-up of [Hypertension] : hypertension [Medication Management] : Medication management [FreeTextEntry1] : pulmonary hypertension, dilated aorta

## 2021-07-23 NOTE — HISTORY OF PRESENT ILLNESS
[FreeTextEntry1] : Aime Mariee is an 85 year old man with a history of HTN, BPH, diverticulosis, prostate cancer, hospitalization for sepsis, pneumonia, acute pulmonary embolism (June 2019), and dilated ascending thoracic aorta who returns for cardiac examination.  During our last encounter his blood pressure was elevated (~178/96) and I increased his dose of valsartan -- HCTZ. He has been tolerating therapy and says his blood pressure was checked by another physician earlier this week and found to be approximately 120/80.  He offers no new complaints during today's visit.

## 2021-07-23 NOTE — DISCUSSION/SUMMARY
[With Me] : with me [___ Month(s)] : in [unfilled] month(s) [FreeTextEntry1] : \par Hypertension: Marked improvement in blood pressure following recent titration of medications; continue valsartan-HCTZ 320-25 mg daily and metoprolol.\par \par Thoracic aortic aneurysm: I am pleased that blood pressure has improved given the presence of aortic aneurysm; aorta has been stable in size and will need periodic imaging for monitoring.

## 2021-09-12 ENCOUNTER — RX RENEWAL (OUTPATIENT)
Age: 86
End: 2021-09-12

## 2021-11-17 ENCOUNTER — APPOINTMENT (OUTPATIENT)
Dept: CARDIOLOGY | Facility: CLINIC | Age: 86
End: 2021-11-17
Payer: MEDICARE

## 2021-11-17 ENCOUNTER — NON-APPOINTMENT (OUTPATIENT)
Age: 86
End: 2021-11-17

## 2021-11-17 VITALS
OXYGEN SATURATION: 95 % | WEIGHT: 159 LBS | HEART RATE: 92 BPM | BODY MASS INDEX: 26.49 KG/M2 | HEIGHT: 65 IN | SYSTOLIC BLOOD PRESSURE: 144 MMHG | DIASTOLIC BLOOD PRESSURE: 84 MMHG

## 2021-11-17 VITALS — DIASTOLIC BLOOD PRESSURE: 82 MMHG | SYSTOLIC BLOOD PRESSURE: 130 MMHG

## 2021-11-17 PROCEDURE — 99214 OFFICE O/P EST MOD 30 MIN: CPT

## 2021-11-17 PROCEDURE — 93000 ELECTROCARDIOGRAM COMPLETE: CPT

## 2021-11-17 NOTE — PHYSICAL EXAM
[Well Groomed] : well groomed [General Appearance - In No Acute Distress] : no acute distress [] : no respiratory distress [Respiration, Rhythm And Depth] : normal respiratory rhythm and effort [Heart Rate And Rhythm] : heart rate and rhythm were normal [Heart Sounds] : normal S1 and S2 [Edema] : no peripheral edema present [Abnormal Walk] : normal gait [Nail Clubbing] : no clubbing of the fingernails [Cyanosis, Localized] : no localized cyanosis [Skin Color & Pigmentation] : normal skin color and pigmentation [Oriented To Time, Place, And Person] : oriented to person, place, and time [Impaired Insight] : insight and judgment were intact [Affect] : the affect was normal [Mood] : the mood was normal [FreeTextEntry1] : Appears his stated age

## 2021-11-17 NOTE — DISCUSSION/SUMMARY
[With Me] : with me [___ Month(s)] : in [unfilled] month(s) [FreeTextEntry1] : \par Hypertension: Satisfactory control; continue valsartan-HCTZ 320-25 mg daily and metoprolol.\par \par Thoracic aortic aneurysm:  CT imaging in 2020 describes a stable, 4.7 cm aortic aneurysm -- he says that he had a subsequent CT and will forward the results (he believes dimensions are similar).  Continue metoprolol.\par \par History of pulmonary embolism: Patient remains on anticoagulation -- duration deferred to his pulmonologist, Dr. Martinez\par \par Coronary artery calcifications: CT of the chest describes significant coronary artery calcifications (incidental finding) -- patient denies angina and is not interested in pursuing stress testing at this time.\par

## 2022-03-06 ENCOUNTER — RX RENEWAL (OUTPATIENT)
Age: 87
End: 2022-03-06

## 2022-05-18 ENCOUNTER — NON-APPOINTMENT (OUTPATIENT)
Age: 87
End: 2022-05-18

## 2022-05-18 ENCOUNTER — APPOINTMENT (OUTPATIENT)
Dept: CARDIOLOGY | Facility: CLINIC | Age: 87
End: 2022-05-18
Payer: MEDICARE

## 2022-05-18 VITALS
HEIGHT: 65 IN | WEIGHT: 163 LBS | SYSTOLIC BLOOD PRESSURE: 140 MMHG | BODY MASS INDEX: 27.16 KG/M2 | OXYGEN SATURATION: 96 % | DIASTOLIC BLOOD PRESSURE: 88 MMHG | HEART RATE: 75 BPM

## 2022-05-18 DIAGNOSIS — I26.99 OTHER PULMONARY EMBOLISM W/OUT ACUTE COR PULMONALE: ICD-10-CM

## 2022-05-18 PROCEDURE — 99214 OFFICE O/P EST MOD 30 MIN: CPT

## 2022-05-18 PROCEDURE — 93000 ELECTROCARDIOGRAM COMPLETE: CPT

## 2022-05-18 NOTE — HISTORY OF PRESENT ILLNESS
[FreeTextEntry1] : Aime Mariee is an 86 year old man with a history of HTN, ascending aortic aneurysm, hospitalization for sepsis + pneumonia + pulmonary embolism (June 2019), BPH, diverticulosis, and prostate cancer, who returns for cardiac examination -our last encounter was in November 2021.  He has been feeling well since I last saw him and offers no new cardiovascular symptoms.  He continues to take Xarelto and has been tolerating anticoagulation with no bleeding complications.  He denies angina, dyspnea; complains of fatigue.

## 2022-05-18 NOTE — DISCUSSION/SUMMARY
[With Me] : with me [___ Month(s)] : in [unfilled] month(s) [FreeTextEntry1] : \par Hypertension: Satisfactory control; continue valsartan-HCTZ and metoprolol.\par \par Thoracic aortic aneurysm: Recent CTs have revealed enlargement of the thoracic aorta, consistent with aneurysm --measurements have varied from 4.0 cm to 4.7 cm.  He says that he had a recent CT performed at [facility] and the aortic dimensions were described as "stable" -he will email me a copy of the report; blood pressure is reasonably well controlled; I recommend continuing metoprolol.\par \par History of pulmonary embolism: Patient remains on anticoagulation -- duration deferred to his pulmonologist, Dr. Martinez, who has been monitoring serial D-dimers\par \par Coronary artery calcifications: Good baseline functional status (limited by arthritic knee pain) but no angina.

## 2022-05-18 NOTE — REVIEW OF SYSTEMS
[Feeling Fatigued] : feeling fatigued [SOB] : no shortness of breath [Dyspnea on exertion] : not dyspnea during exertion [Palpitations] : no palpitations [Syncope] : no syncope [Dizziness] : no dizziness

## 2022-05-18 NOTE — PHYSICAL EXAM
[Normal S1, S2] : normal S1, S2 [Clear Lung Fields] : clear lung fields [Normal Gait] : normal gait [No Edema] : no edema [Moves all extremities] : moves all extremities [Normal Speech] : normal speech [Alert and Oriented] : alert and oriented [de-identified] : Appears stated age, overweight [de-identified] : CHUCKY [de-identified] : Wearing a face mask

## 2022-08-30 ENCOUNTER — RX RENEWAL (OUTPATIENT)
Age: 87
End: 2022-08-30

## 2022-09-08 LAB
ALBUMIN SERPL ELPH-MCNC: 4.3 G/DL
ALP BLD-CCNC: 70 U/L
ALT SERPL-CCNC: 12 U/L
ANION GAP SERPL CALC-SCNC: 13 MMOL/L
AST SERPL-CCNC: 15 U/L
BASOPHILS # BLD AUTO: 0.04 K/UL
BASOPHILS NFR BLD AUTO: 0.8 %
BILIRUB SERPL-MCNC: 0.6 MG/DL
BUN SERPL-MCNC: 23 MG/DL
CALCIUM SERPL-MCNC: 9.8 MG/DL
CHLORIDE SERPL-SCNC: 106 MMOL/L
CHOLEST SERPL-MCNC: 142 MG/DL
CO2 SERPL-SCNC: 26 MMOL/L
CREAT SERPL-MCNC: 1.25 MG/DL
EGFR: 56 ML/MIN/1.73M2
EOSINOPHIL # BLD AUTO: 0.07 K/UL
EOSINOPHIL NFR BLD AUTO: 1.3 %
GLUCOSE SERPL-MCNC: 102 MG/DL
HCT VFR BLD CALC: 47 %
HDLC SERPL-MCNC: 43 MG/DL
HGB BLD-MCNC: 15.7 G/DL
IMM GRANULOCYTES NFR BLD AUTO: 0.2 %
LDLC SERPL CALC-MCNC: 82 MG/DL
LYMPHOCYTES # BLD AUTO: 1.5 K/UL
LYMPHOCYTES NFR BLD AUTO: 28.6 %
MAN DIFF?: NORMAL
MCHC RBC-ENTMCNC: 30.3 PG
MCHC RBC-ENTMCNC: 33.4 GM/DL
MCV RBC AUTO: 90.6 FL
MONOCYTES # BLD AUTO: 0.33 K/UL
MONOCYTES NFR BLD AUTO: 6.3 %
NEUTROPHILS # BLD AUTO: 3.3 K/UL
NEUTROPHILS NFR BLD AUTO: 62.8 %
NONHDLC SERPL-MCNC: 99 MG/DL
PLATELET # BLD AUTO: 228 K/UL
POTASSIUM SERPL-SCNC: 3.6 MMOL/L
PROT SERPL-MCNC: 7.2 G/DL
RBC # BLD: 5.19 M/UL
RBC # FLD: 13 %
SODIUM SERPL-SCNC: 144 MMOL/L
TRIGL SERPL-MCNC: 86 MG/DL
WBC # FLD AUTO: 5.25 K/UL

## 2022-09-09 NOTE — PATIENT PROFILE ADULT - RESOURCE/ENVIRONMENTAL CONCERNS
Patient called stating that she stopped taking Metformin per the doctor to do a particular test. She did the test, and she did start taking it again. When she did, she did get diarrhea that was pretty bad. It seemed to get a little better after a couple days, but she is still loose. She also wanted you to know she did start taking Glipizide again once a day. She stated that her blood sugar has been holding at about 169. It did drop a little one day, but that seemed to be where it has been at 169. She is asking for a call back to advise what she should do about the diarrhea. Thanks!       none

## 2022-09-30 ENCOUNTER — RX RENEWAL (OUTPATIENT)
Age: 87
End: 2022-09-30

## 2022-11-09 ENCOUNTER — APPOINTMENT (OUTPATIENT)
Dept: CARDIOLOGY | Facility: CLINIC | Age: 87
End: 2022-11-09

## 2022-11-09 ENCOUNTER — NON-APPOINTMENT (OUTPATIENT)
Age: 87
End: 2022-11-09

## 2022-11-09 VITALS
HEART RATE: 78 BPM | BODY MASS INDEX: 26.82 KG/M2 | SYSTOLIC BLOOD PRESSURE: 138 MMHG | WEIGHT: 161 LBS | DIASTOLIC BLOOD PRESSURE: 88 MMHG | OXYGEN SATURATION: 98 % | RESPIRATION RATE: 14 BRPM | HEIGHT: 65 IN

## 2022-11-09 VITALS
SYSTOLIC BLOOD PRESSURE: 170 MMHG | OXYGEN SATURATION: 98 % | WEIGHT: 161 LBS | HEIGHT: 65 IN | BODY MASS INDEX: 26.82 KG/M2 | HEART RATE: 78 BPM | DIASTOLIC BLOOD PRESSURE: 88 MMHG

## 2022-11-09 VITALS — SYSTOLIC BLOOD PRESSURE: 138 MMHG | DIASTOLIC BLOOD PRESSURE: 88 MMHG

## 2022-11-09 PROCEDURE — 99214 OFFICE O/P EST MOD 30 MIN: CPT

## 2022-11-09 PROCEDURE — 93000 ELECTROCARDIOGRAM COMPLETE: CPT

## 2022-11-09 RX ORDER — RIVAROXABAN 10 MG/1
10 TABLET, FILM COATED ORAL
Qty: 90 | Refills: 0 | Status: ACTIVE | COMMUNITY
Start: 2022-08-11

## 2022-11-09 RX ORDER — RIVAROXABAN 20 MG/1
20 TABLET, FILM COATED ORAL DAILY
Refills: 0 | Status: DISCONTINUED | COMMUNITY
End: 2022-11-09

## 2022-11-09 RX ORDER — CHROMIUM 200 MCG
1000 TABLET ORAL DAILY
Refills: 0 | Status: ACTIVE | COMMUNITY

## 2022-11-09 RX ORDER — UBIQUINOL 100 MG
CAPSULE ORAL
Refills: 0 | Status: ACTIVE | COMMUNITY

## 2022-11-09 RX ORDER — VITAMIN B COMPLEX
CAPSULE ORAL
Refills: 0 | Status: ACTIVE | COMMUNITY

## 2022-11-09 NOTE — REVIEW OF SYSTEMS
[SOB] : no shortness of breath [Palpitations] : no palpitations [Joint Pain] : joint pain [Dizziness] : no dizziness [Under Stress] : under stress [FreeTextEntry9] : Knee pain

## 2022-11-09 NOTE — PHYSICAL EXAM
[Normal S1, S2] : normal S1, S2 [Clear Lung Fields] : clear lung fields [Normal Gait] : normal gait [No Edema] : no edema [Normal Speech] : normal speech [Alert and Oriented] : alert and oriented [No Acute Distress] : no acute distress [de-identified] : Wearing a face mask

## 2022-11-09 NOTE — DISCUSSION/SUMMARY
[With Me] : with me [___ Month(s)] : in [unfilled] month(s) [FreeTextEntry1] : \par Hypertension: Satisfactory control; continue valsartan-HCTZ and metoprolol.\par \par Thoracic aortic aneurysm: Moderate enlargement; stable;   Patient understands need for regular monitoring of aortic dimensions;   Continue metoprolol.\par \par Coronary artery calcifications: No angina; stable ECG appearance.\par

## 2022-11-09 NOTE — HISTORY OF PRESENT ILLNESS
[FreeTextEntry1] : Aime Mariee is an 87-year-old man with a history of hypretension, ascending aortic aneurysm, hospitalization for sepsis + pneumonia + pulmonary embolism (June 2019), BPH, diverticulosis, and prostate cancer, who returns for cardiac examination.  He continues to feel well but describes severe stress Related to his son-in-law's illness (recent  valve surgery).  Mr Mariee  has not been experiencing chest discomfort, difficulty breathing, or palpitations.  He has been tolerating prescribed pharmacotherapy.

## 2022-12-27 ENCOUNTER — RX RENEWAL (OUTPATIENT)
Age: 87
End: 2022-12-27

## 2023-04-10 ENCOUNTER — APPOINTMENT (OUTPATIENT)
Dept: ORTHOPEDIC SURGERY | Facility: CLINIC | Age: 88
End: 2023-04-10
Payer: MEDICARE

## 2023-04-10 VITALS — WEIGHT: 162 LBS | HEIGHT: 64 IN | BODY MASS INDEX: 27.66 KG/M2

## 2023-04-10 DIAGNOSIS — Z78.9 OTHER SPECIFIED HEALTH STATUS: ICD-10-CM

## 2023-04-10 DIAGNOSIS — M19.012 PRIMARY OSTEOARTHRITIS, LEFT SHOULDER: ICD-10-CM

## 2023-04-10 DIAGNOSIS — Z87.891 PERSONAL HISTORY OF NICOTINE DEPENDENCE: ICD-10-CM

## 2023-04-10 DIAGNOSIS — M17.11 UNILATERAL PRIMARY OSTEOARTHRITIS, RIGHT KNEE: ICD-10-CM

## 2023-04-10 DIAGNOSIS — M19.011 PRIMARY OSTEOARTHRITIS, RIGHT SHOULDER: ICD-10-CM

## 2023-04-10 PROCEDURE — 99214 OFFICE O/P EST MOD 30 MIN: CPT | Mod: 25

## 2023-04-10 PROCEDURE — 20610 DRAIN/INJ JOINT/BURSA W/O US: CPT | Mod: RT

## 2023-04-10 PROCEDURE — 73564 X-RAY EXAM KNEE 4 OR MORE: CPT | Mod: 50

## 2023-04-10 NOTE — IMAGING
[de-identified] : Examination of both lower extremities reveal normal neurovascular exam.  Examination of both knees were limited range of motion from 5 to 120 degrees with varus deformity with the left much worse than the right.  He has medial joint line tenderness bilaterally with equivocal Geovani signs.  There is no effusions.  There is no instability or apprehension.  There is no redness rashes.  Exam of both hips are normal with full painless range of motion.

## 2023-04-10 NOTE — ASSESSMENT
[FreeTextEntry1] : Patient comes in today for evaluation of both of his knees.  His last cortisone shots were over a year ago.  His left knee is worse than the right one.  He has pain walking distances doing stairs and occasionally at night.  He has progressive varus deformity of the left knee in particular.  He also complains of pain in both shoulders with the right worse than the left.  He is seeing Dr. Severino for treatment of arthritis.  He is planning on getting cortisone injections in a week or so.

## 2023-04-10 NOTE — HISTORY OF PRESENT ILLNESS
[6] : 6 [0] : 0 [Dull/Aching] : dull/aching [Intermittent] : intermittent [Meds] : meds [Walking] : walking [] : no [FreeTextEntry1] : B/L Knees [FreeTextEntry5] : 88 y/o M presents for f/u eval. of B/L knees. Pt reports of increased pain levels since especially when walking long distances. No associated trauma.  Previous tx Hymovis INJ 2/7/2018 with good relief.  [FreeTextEntry9] : Tylenol [de-identified] : 2/7/2018 [de-identified] : Dr. Badillo [de-identified] : [facility]

## 2023-04-10 NOTE — DISCUSSION/SUMMARY
[de-identified] : Plan at this time is to proceed with a left total knee replacement.  All risks benefits and alternatives of the procedure were discussed the patient detail and he wished to proceed.

## 2023-04-10 NOTE — DATA REVIEWED
[FreeTextEntry1] : X-rays done in the office today bilateral knees 4 views weightbearing show severe bone-on-bone arthritis of the medial compartments with varus deformity.  There is degenerative changes in all 3 compartments.  There are no obvious tumors masses or calcifications seen.

## 2023-05-10 ENCOUNTER — APPOINTMENT (OUTPATIENT)
Dept: CARDIOLOGY | Facility: CLINIC | Age: 88
End: 2023-05-10

## 2023-05-17 ENCOUNTER — NON-APPOINTMENT (OUTPATIENT)
Age: 88
End: 2023-05-17

## 2023-05-17 ENCOUNTER — APPOINTMENT (OUTPATIENT)
Dept: CARDIOLOGY | Facility: CLINIC | Age: 88
End: 2023-05-17
Payer: MEDICARE

## 2023-05-17 VITALS
HEART RATE: 96 BPM | WEIGHT: 163 LBS | SYSTOLIC BLOOD PRESSURE: 124 MMHG | HEIGHT: 64 IN | BODY MASS INDEX: 27.83 KG/M2 | DIASTOLIC BLOOD PRESSURE: 88 MMHG | OXYGEN SATURATION: 97 %

## 2023-05-17 DIAGNOSIS — Z01.810 ENCOUNTER FOR PREPROCEDURAL CARDIOVASCULAR EXAMINATION: ICD-10-CM

## 2023-05-17 PROCEDURE — 93000 ELECTROCARDIOGRAM COMPLETE: CPT | Mod: NC

## 2023-05-17 PROCEDURE — 99214 OFFICE O/P EST MOD 30 MIN: CPT

## 2023-05-17 NOTE — REASON FOR VISIT
[Hypertension] : hypertension [Carotid, Aortic and Peripheral Vascular Disease] : carotid, aortic and peripheral vascular disease [Other: ____] : [unfilled]

## 2023-05-18 ENCOUNTER — APPOINTMENT (OUTPATIENT)
Dept: CARDIOLOGY | Facility: CLINIC | Age: 88
End: 2023-05-18
Payer: MEDICARE

## 2023-05-18 PROCEDURE — 93306 TTE W/DOPPLER COMPLETE: CPT

## 2023-05-19 NOTE — PHYSICAL THERAPY INITIAL EVALUATION ADULT - STANDING BALANCE: STATIC
good minus Graft Donor Site Bandage (Optional-Leave Blank If You Don't Want In Note): A pressure bandage were applied to the donor site.

## 2023-05-23 ENCOUNTER — NON-APPOINTMENT (OUTPATIENT)
Age: 88
End: 2023-05-23

## 2023-05-31 ENCOUNTER — OUTPATIENT (OUTPATIENT)
Dept: OUTPATIENT SERVICES | Facility: HOSPITAL | Age: 88
LOS: 1 days | End: 2023-05-31
Payer: MEDICARE

## 2023-05-31 ENCOUNTER — RESULT REVIEW (OUTPATIENT)
Age: 88
End: 2023-05-31

## 2023-05-31 VITALS
SYSTOLIC BLOOD PRESSURE: 150 MMHG | RESPIRATION RATE: 16 BRPM | HEART RATE: 95 BPM | OXYGEN SATURATION: 98 % | WEIGHT: 158.07 LBS | DIASTOLIC BLOOD PRESSURE: 80 MMHG | TEMPERATURE: 98 F | HEIGHT: 64 IN

## 2023-05-31 DIAGNOSIS — Z01.818 ENCOUNTER FOR OTHER PREPROCEDURAL EXAMINATION: ICD-10-CM

## 2023-05-31 DIAGNOSIS — M17.12 UNILATERAL PRIMARY OSTEOARTHRITIS, LEFT KNEE: ICD-10-CM

## 2023-05-31 LAB
A1C WITH ESTIMATED AVERAGE GLUCOSE RESULT: 6.2 % — HIGH (ref 4–5.6)
ABO RH CONFIRMATION: SIGNIFICANT CHANGE UP
ALBUMIN SERPL ELPH-MCNC: 3.9 G/DL — SIGNIFICANT CHANGE UP (ref 3.3–5)
ALP SERPL-CCNC: 64 U/L — SIGNIFICANT CHANGE UP (ref 40–120)
ALT FLD-CCNC: 28 U/L — SIGNIFICANT CHANGE UP (ref 12–78)
ANION GAP SERPL CALC-SCNC: 3 MMOL/L — LOW (ref 5–17)
APTT BLD: 33.1 SEC — SIGNIFICANT CHANGE UP (ref 27.5–35.5)
AST SERPL-CCNC: 20 U/L — SIGNIFICANT CHANGE UP (ref 15–37)
BASOPHILS # BLD AUTO: 0.02 K/UL — SIGNIFICANT CHANGE UP (ref 0–0.2)
BASOPHILS NFR BLD AUTO: 0.3 % — SIGNIFICANT CHANGE UP (ref 0–2)
BILIRUB SERPL-MCNC: 0.6 MG/DL — SIGNIFICANT CHANGE UP (ref 0.2–1.2)
BLD GP AB SCN SERPL QL: SIGNIFICANT CHANGE UP
BUN SERPL-MCNC: 19 MG/DL — SIGNIFICANT CHANGE UP (ref 7–23)
CALCIUM SERPL-MCNC: 9.9 MG/DL — SIGNIFICANT CHANGE UP (ref 8.5–10.1)
CHLORIDE SERPL-SCNC: 112 MMOL/L — HIGH (ref 96–108)
CO2 SERPL-SCNC: 30 MMOL/L — SIGNIFICANT CHANGE UP (ref 22–31)
CREAT SERPL-MCNC: 1.25 MG/DL — SIGNIFICANT CHANGE UP (ref 0.5–1.3)
EGFR: 56 ML/MIN/1.73M2 — LOW
EOSINOPHIL # BLD AUTO: 0.01 K/UL — SIGNIFICANT CHANGE UP (ref 0–0.5)
EOSINOPHIL NFR BLD AUTO: 0.2 % — SIGNIFICANT CHANGE UP (ref 0–6)
ESTIMATED AVERAGE GLUCOSE: 131 MG/DL — HIGH (ref 68–114)
GLUCOSE SERPL-MCNC: 94 MG/DL — SIGNIFICANT CHANGE UP (ref 70–99)
HCT VFR BLD CALC: 48.5 % — SIGNIFICANT CHANGE UP (ref 39–50)
HGB BLD-MCNC: 16.5 G/DL — SIGNIFICANT CHANGE UP (ref 13–17)
IMM GRANULOCYTES NFR BLD AUTO: 0.3 % — SIGNIFICANT CHANGE UP (ref 0–0.9)
INR BLD: 1.19 RATIO — HIGH (ref 0.88–1.16)
LYMPHOCYTES # BLD AUTO: 0.93 K/UL — LOW (ref 1–3.3)
LYMPHOCYTES # BLD AUTO: 14.2 % — SIGNIFICANT CHANGE UP (ref 13–44)
MCHC RBC-ENTMCNC: 31.1 PG — SIGNIFICANT CHANGE UP (ref 27–34)
MCHC RBC-ENTMCNC: 34 GM/DL — SIGNIFICANT CHANGE UP (ref 32–36)
MCV RBC AUTO: 91.5 FL — SIGNIFICANT CHANGE UP (ref 80–100)
MONOCYTES # BLD AUTO: 0.36 K/UL — SIGNIFICANT CHANGE UP (ref 0–0.9)
MONOCYTES NFR BLD AUTO: 5.5 % — SIGNIFICANT CHANGE UP (ref 2–14)
MRSA PCR RESULT.: SIGNIFICANT CHANGE UP
NEUTROPHILS # BLD AUTO: 5.22 K/UL — SIGNIFICANT CHANGE UP (ref 1.8–7.4)
NEUTROPHILS NFR BLD AUTO: 79.5 % — HIGH (ref 43–77)
PLATELET # BLD AUTO: 211 K/UL — SIGNIFICANT CHANGE UP (ref 150–400)
POTASSIUM SERPL-MCNC: 3.5 MMOL/L — SIGNIFICANT CHANGE UP (ref 3.5–5.3)
POTASSIUM SERPL-SCNC: 3.5 MMOL/L — SIGNIFICANT CHANGE UP (ref 3.5–5.3)
PROT SERPL-MCNC: 7.8 GM/DL — SIGNIFICANT CHANGE UP (ref 6–8.3)
PROTHROM AB SERPL-ACNC: 13.8 SEC — HIGH (ref 10.5–13.4)
RBC # BLD: 5.3 M/UL — SIGNIFICANT CHANGE UP (ref 4.2–5.8)
RBC # FLD: 13.3 % — SIGNIFICANT CHANGE UP (ref 10.3–14.5)
S AUREUS DNA NOSE QL NAA+PROBE: SIGNIFICANT CHANGE UP
SODIUM SERPL-SCNC: 145 MMOL/L — SIGNIFICANT CHANGE UP (ref 135–145)
WBC # BLD: 6.56 K/UL — SIGNIFICANT CHANGE UP (ref 3.8–10.5)
WBC # FLD AUTO: 6.56 K/UL — SIGNIFICANT CHANGE UP (ref 3.8–10.5)

## 2023-05-31 PROCEDURE — 86901 BLOOD TYPING SEROLOGIC RH(D): CPT

## 2023-05-31 PROCEDURE — 80053 COMPREHEN METABOLIC PANEL: CPT

## 2023-05-31 PROCEDURE — 99214 OFFICE O/P EST MOD 30 MIN: CPT | Mod: 25

## 2023-05-31 PROCEDURE — 71046 X-RAY EXAM CHEST 2 VIEWS: CPT | Mod: 26

## 2023-05-31 PROCEDURE — 36415 COLL VENOUS BLD VENIPUNCTURE: CPT

## 2023-05-31 PROCEDURE — 87641 MR-STAPH DNA AMP PROBE: CPT

## 2023-05-31 PROCEDURE — 87640 STAPH A DNA AMP PROBE: CPT

## 2023-05-31 PROCEDURE — 83036 HEMOGLOBIN GLYCOSYLATED A1C: CPT

## 2023-05-31 PROCEDURE — 71046 X-RAY EXAM CHEST 2 VIEWS: CPT

## 2023-05-31 PROCEDURE — 85610 PROTHROMBIN TIME: CPT

## 2023-05-31 PROCEDURE — 86850 RBC ANTIBODY SCREEN: CPT

## 2023-05-31 PROCEDURE — 86900 BLOOD TYPING SEROLOGIC ABO: CPT

## 2023-05-31 PROCEDURE — 85025 COMPLETE CBC W/AUTO DIFF WBC: CPT

## 2023-05-31 PROCEDURE — 85730 THROMBOPLASTIN TIME PARTIAL: CPT

## 2023-05-31 RX ORDER — DOXAZOSIN MESYLATE 4 MG
0.5 TABLET ORAL
Qty: 0 | Refills: 0 | DISCHARGE

## 2023-05-31 NOTE — H&P PST ADULT - NSICDXPASTMEDICALHX_GEN_ALL_CORE_FT
PAST MEDICAL HISTORY:  BPH (Benign Prostatic Hyperplasia)     Deviated nasal septum     Diverticulosis sigmoid    History of atypical nevus     History of prostate cancer seed implant 01/2004    HTN (hypertension)     OA (osteoarthritis)     Pulmonary embolism     Rhinitis     Sensorineural hearing loss bilateral    Tubular adenoma      PAST MEDICAL HISTORY:  Ascending aortic aneurysm     BPH (Benign Prostatic Hyperplasia)     Deviated nasal septum     Dilated aortic root     Diverticulosis sigmoid    H/O sepsis     History of atypical nevus     History of prostate cancer seed implant 01/2004    HTN (hypertension)     OA (osteoarthritis)     Pulmonary embolism     Rhinitis     Sensorineural hearing loss bilateral    Tricuspid regurgitation     Tubular adenoma

## 2023-05-31 NOTE — H&P PST ADULT - ASSESSMENT
Plan:  1. PST instructions given ; NPO status/  instructions to be given by ASU   2. Pt instructed to take following meds on day of surgery:   3. Pt instructed to take routine evening medications unless indicated   4. Stop NSAIDS ( Aspirin Alev Motrin Mobic Diclofenac), herbal supplements , MVI , Vitamin fish oil 7 days prior to surgery  unless   directed by surgeon or cardiologist;   5. Medical Optimization  with    6. EZ wash instructions given & mupirocin instructions given  7. Labs EKG CXR as per surgeon request   8. Pt denies covid symptoms shortness of breath fever cough   9.  Joint Education Booklet given   10. PATIENT WILL REQUIRE A WALKER POST-OPERATIVELY FOR TOTAL JOINT REPLACEMENT SURGERY DUE TO OSTEOARTHRITIS OF HIP OR KNEE     CAPRINI SCORE [CLOT]    AGE RELATED RISK FACTORS                                                       MOBILITY RELATED FACTORS  [ ] Age 41-60 years                                            (1 Point)                  [ ] Bed rest                                                        (1 Point)  [ ] Age: 61-74 years                                           (2 Points)                 [ ] Plaster cast                                                   (2 Points)  [ ] Age= 75 years                                              (3 Points)                 [ ] Bed bound for more than 72 hours                 (2 Points)    DISEASE RELATED RISK FACTORS                                               GENDER SPECIFIC FACTORS  [ ] Edema in the lower extremities                       (1 Point)                  [ ] Pregnancy                                                     (1 Point)  [ ] Varicose veins                                               (1 Point)                  [ ] Post-partum < 6 weeks                                   (1 Point)             [ ] BMI > 25 Kg/m2                                            (1 Point)                  [ ] Hormonal therapy  or oral contraception          (1 Point)                 [ ] Sepsis (in the previous month)                        (1 Point)                  [ ] History of pregnancy complications                 (1 point)  [ ] Pneumonia or serious lung disease                                               [ ] Unexplained or recurrent                     (1 Point)           (in the previous month)                               (1 Point)  [ ] Abnormal pulmonary function test                     (1 Point)                 SURGERY RELATED RISK FACTORS  [ ] Acute myocardial infarction                              (1 Point)                 [ ]  Section                                             (1 Point)  [ ] Congestive heart failure (in the previous month)  (1 Point)               [ ] Minor surgery                                                  (1 Point)   [ ] Inflammatory bowel disease                             (1 Point)                 [ ] Arthroscopic surgery                                        (2 Points)  [ ] Central venous access                                      (2 Points)                [ ] General surgery lasting more than 45 minutes   (2 Points)       [ ] Stroke (in the previous month)                          (5 Points)               [ ] Elective arthroplasty                                         (5 Points)            ( ) presents and past malignancy                           (2 points)                                                                                                         HEMATOLOGY RELATED FACTORS                                                 TRAUMA RELATED RISK FACTORS  [ ] Prior episodes of VTE                                     (3 Points)                 [ ] Fracture of the hip, pelvis, or leg                       (5 Points)  [ ] Positive family history for VTE                         (3 Points)                 [ ] Acute spinal cord injury (in the previous month)  (5 Points)  [ ] Prothrombin 14854 A                                     (3 Points)                 [ ] Paralysis  (less than 1 month)                             (5 Points)  [ ] Factor V Leiden                                             (3 Points)                  [ ] Multiple Trauma within 1 month                        (5 Points)  [ ] Lupus anticoagulants                                     (3 Points)                                                           [ ] Anticardiolipin antibodies                               (3 Points)                                                       [ ] High homocysteine in the blood                      (3 Points)                                             [ ] Other congenital or acquired thrombophilia      (3 Points)                                                [ ] Heparin induced thrombocytopenia                  (3 Points)                                          Total Score [          ]    The Caprini score indicates that this patient is at high risk for a VTE event (score 6 or greater). Surgical patients in this group will benefit from both pharmacologic prophylaxis and intermittent compression devices.  The surgical team will determine the balance between VTE risk and bleeding risk, and other clinical considerations  87 year old male with OA left knee c/o left knee pain with ROM as well as weakness; Pt said " My knee feels unstable" Pain is sharp with ROM 7/10; he presents to PST for planned Left TKR       Plan:  1. PST instructions given ; NPO status/  instructions to be given by ASU   2. Pt instructed to take following meds on day of surgery: metoprolol   3. Pt instructed to take routine evening medications unless indicated   4. Stop NSAIDS ( Aspirin Alev Motrin Mobic Diclofenac), herbal supplements , MVI , Vitamin fish oil 7 days prior to surgery  unless   directed by surgeon or cardiologist;   5. Medical Optimization  with Dr Paulino and cardio Dr Roberts   6. EZ wash instructions given & mupirocin instructions given  7. Labs EKG CXR as per surgeon request   8. Pt denies covid symptoms shortness of breath fever cough   9.  Joint Education Booklet given   10. PATIENT WILL REQUIRE A WALKER POST-OPERATIVELY FOR TOTAL JOINT REPLACEMENT SURGERY DUE TO OSTEOARTHRITIS OF  KNEE     CAPRINI SCORE [CLOT]    AGE RELATED RISK FACTORS                                                       MOBILITY RELATED FACTORS  [ ] Age 41-60 years                                            (1 Point)                  [ ] Bed rest                                                        (1 Point)  [ ] Age: 61-74 years                                           (2 Points)                 [ ] Plaster cast                                                   (2 Points)  [x ] Age= 75 years                                              (3 Points)                 [ ] Bed bound for more than 72 hours                 (2 Points)    DISEASE RELATED RISK FACTORS                                               GENDER SPECIFIC FACTORS  [ ] Edema in the lower extremities                       (1 Point)                  [ ] Pregnancy                                                     (1 Point)  [ ] Varicose veins                                               (1 Point)                  [ ] Post-partum < 6 weeks                                   (1 Point)             [x ] BMI > 25 Kg/m2                                            (1 Point)                  [ ] Hormonal therapy  or oral contraception          (1 Point)                 [ ] Sepsis (in the previous month)                        (1 Point)                  [ ] History of pregnancy complications                 (1 point)  [ ] Pneumonia or serious lung disease                                               [ ] Unexplained or recurrent                     (1 Point)           (in the previous month)                               (1 Point)  [ ] Abnormal pulmonary function test                     (1 Point)                 SURGERY RELATED RISK FACTORS  [ ] Acute myocardial infarction                              (1 Point)                 [ ]  Section                                             (1 Point)  [ ] Congestive heart failure (in the previous month)  (1 Point)               [ ] Minor surgery                                                  (1 Point)   [ ] Inflammatory bowel disease                             (1 Point)                 [ ] Arthroscopic surgery                                        (2 Points)  [ ] Central venous access                                      (2 Points)                [ ] General surgery lasting more than 45 minutes   (2 Points)       [ ] Stroke (in the previous month)                          (5 Points)               [x ] Elective arthroplasty                                         (5 Points)            (x ) presents and past malignancy                           (2 points)                                                                                                         HEMATOLOGY RELATED FACTORS                                                 TRAUMA RELATED RISK FACTORS  [x ] Prior episodes of VTE                                     (3 Points)                 [ ] Fracture of the hip, pelvis, or leg                       (5 Points)  [ ] Positive family history for VTE                         (3 Points)                 [ ] Acute spinal cord injury (in the previous month)  (5 Points)  [ ] Prothrombin 31429 A                                     (3 Points)                 [ ] Paralysis  (less than 1 month)                             (5 Points)  [ ] Factor V Leiden                                             (3 Points)                  [ ] Multiple Trauma within 1 month                        (5 Points)  [ ] Lupus anticoagulants                                     (3 Points)                                                           [ ] Anticardiolipin antibodies                               (3 Points)                                                       [ ] High homocysteine in the blood                      (3 Points)                                             [ ] Other congenital or acquired thrombophilia      (3 Points)                                                [ ] Heparin induced thrombocytopenia                  (3 Points)                                          Total Score [      14    ]    The Caprini score indicates that this patient is at high risk for a VTE event (score 6 or greater). Surgical patients in this group will benefit from both pharmacologic prophylaxis and intermittent compression devices.  The surgical team will determine the balance between VTE risk and bleeding risk, and other clinical considerations

## 2023-05-31 NOTE — H&P PST ADULT - HISTORY OF PRESENT ILLNESS
87 year old male with OA left knee c/o left knee pain with ROM as well as weakness; Pt said " My knee feels unstable" Pain is sharp with ROM 7/10; he presents to PST for planned Left TKR

## 2023-05-31 NOTE — H&P PST ADULT - NSICDXFAMILYHX_GEN_ALL_CORE_FT
FAMILY HISTORY:  Father  Still living? Unknown  FH: lymphoma, Age at diagnosis: Age Unknown    Mother  Still living? Unknown  FH: coronary artery disease, Age at diagnosis: Age Unknown    Sibling  Still living? Unknown  FH: bladder cancer, Age at diagnosis: Age Unknown

## 2023-06-01 ENCOUNTER — OUTPATIENT (OUTPATIENT)
Dept: OUTPATIENT SERVICES | Facility: HOSPITAL | Age: 88
LOS: 1 days | End: 2023-06-01
Payer: MEDICARE

## 2023-06-01 ENCOUNTER — RESULT REVIEW (OUTPATIENT)
Age: 88
End: 2023-06-01

## 2023-06-01 DIAGNOSIS — I25.10 ATHEROSCLEROTIC HEART DISEASE OF NATIVE CORONARY ARTERY WITHOUT ANGINA PECTORIS: ICD-10-CM

## 2023-06-01 DIAGNOSIS — M17.12 UNILATERAL PRIMARY OSTEOARTHRITIS, LEFT KNEE: ICD-10-CM

## 2023-06-01 DIAGNOSIS — Z01.810 ENCOUNTER FOR PREPROCEDURAL CARDIOVASCULAR EXAMINATION: ICD-10-CM

## 2023-06-01 DIAGNOSIS — Z01.818 ENCOUNTER FOR OTHER PREPROCEDURAL EXAMINATION: ICD-10-CM

## 2023-06-01 PROCEDURE — 78452 HT MUSCLE IMAGE SPECT MULT: CPT

## 2023-06-01 PROCEDURE — 93018 CV STRESS TEST I&R ONLY: CPT

## 2023-06-01 PROCEDURE — 78452 HT MUSCLE IMAGE SPECT MULT: CPT | Mod: 26,MH

## 2023-06-01 PROCEDURE — A9500: CPT

## 2023-06-01 PROCEDURE — 93016 CV STRESS TEST SUPVJ ONLY: CPT

## 2023-06-01 PROCEDURE — 93017 CV STRESS TEST TRACING ONLY: CPT

## 2023-06-01 RX ORDER — ERGOCALCIFEROL 1.25 MG/1
0 CAPSULE ORAL
Refills: 0 | DISCHARGE

## 2023-06-01 RX ORDER — DOXAZOSIN MESYLATE 4 MG
1 TABLET ORAL
Refills: 0 | DISCHARGE

## 2023-06-01 RX ORDER — RIVAROXABAN 15 MG-20MG
1 KIT ORAL
Refills: 0 | DISCHARGE

## 2023-06-01 RX ORDER — METOPROLOL TARTRATE 50 MG
1 TABLET ORAL
Refills: 0 | DISCHARGE

## 2023-06-01 RX ORDER — REGADENOSON 0.08 MG/ML
0.4 INJECTION, SOLUTION INTRAVENOUS ONCE
Refills: 0 | Status: COMPLETED | OUTPATIENT
Start: 2023-06-01 | End: 2023-06-01

## 2023-06-01 RX ADMIN — REGADENOSON 0.4 MILLIGRAM(S): 0.08 INJECTION, SOLUTION INTRAVENOUS at 10:13

## 2023-06-01 NOTE — CHART NOTE - NSCHARTNOTEFT_GEN_A_CORE
Regadenason Stress Test Report    DEMETRIUS LINDSEY 87yM  MRN: [id number]  : [birth year]  Admit: 23    Regadenason was 5 cc (0.4 mg Regadenason) which was given rapidly over 10 seconds  into a peripheral IV.  Immediately after regadenson injection, flush w/ 5 cc saline given.  Radiopharmeceutical was injected 10-20 sec after the saline flush.  Pt was monitored for 6 minutes.  A 12 lead ECG was recorded every minute & BP was recorded throughout the test.    Resting ECG: Sinus rhythm  Peak infusion ECG: No additional changes noted.  Chest pain: none    Conclusion:  Normal response to IV lexiscan.  See myocardial perfusion scan report.

## 2023-06-02 DIAGNOSIS — I25.10 ATHEROSCLEROTIC HEART DISEASE OF NATIVE CORONARY ARTERY WITHOUT ANGINA PECTORIS: ICD-10-CM

## 2023-06-02 DIAGNOSIS — Z01.810 ENCOUNTER FOR PREPROCEDURAL CARDIOVASCULAR EXAMINATION: ICD-10-CM

## 2023-06-06 ENCOUNTER — NON-APPOINTMENT (OUTPATIENT)
Age: 88
End: 2023-06-06

## 2023-06-06 NOTE — DISCUSSION/SUMMARY
Pt to MRI   [With Me] : with me [___ Month(s)] : in [unfilled] month(s) [FreeTextEntry1] : \par Preoperative cardiac examination:  Mr. Mariee has several risk factors for heart disease and reduced functional status related to severe osteoarthritis–needs noninvasive cardiac testing prior to elective knee replacement surgery (I ordered an echocardiogram and pharmacologic nuclear stress test).\par \par Hypertension: Satisfactory control; continue valsartan-HCTZ and metoprolol.\par \par Thoracic aortic aneurysm:  .Recent CT chest (5/5/2023) describes the presence of a 4.4 cm thoracic aortic aneurysm–stable in size; blood pressure is controlled; continue observation.\par \par Coronary artery calcifications:   "Moderate" coronary artery calcifications on recent CT imaging; ischemia evaluation planned.\par

## 2023-06-06 NOTE — REVIEW OF SYSTEMS
[Joint Pain] : joint pain [Under Stress] : under stress [SOB] : no shortness of breath [Palpitations] : no palpitations [Chest Discomfort] : no chest discomfort [Dizziness] : no dizziness [FreeTextEntry9] : Knee pain

## 2023-06-06 NOTE — ADDENDUM
[FreeTextEntry1] : ADDENDUM (6/6/2023):\par \par Noninvasive cardiac testing was reviewed:\par Pharmacologic nuclear stress test (6-1-2023): Normal SPECT myocardial perfusion scan with no evidence of reversible or fixed perfusion defects.  LVEF 70%.  No regional wall motion abnormalities.\par \par Echocardiogram (5/18/2023): Technically difficult image quality.  Left ventricular systolic function is normal with ejection fraction 55%.  Mild diastolic dysfunction.  Mild dilation of the aortic root (4.0 cm at the sinuses of Valsalva) and visualized section of proximal ascending aorta (4.1 cm).  Aortic valve sclerosis.  Mild aortic regurgitation.  Mild pulmonic regurgitation.\par \par Mr Mariee appears stable from a cardiac standpoint for upcoming joint replacement surgery.\par

## 2023-06-06 NOTE — HISTORY OF PRESENT ILLNESS
[FreeTextEntry1] : Aime Mariee is an 87-year-old man with a history of hypertension, ascending aortic aneurysm, hospitalization for sepsis + pneumonia + pulmonary embolism (June 2019), BPH, diverticulosis, and prostate cancer, who returns for cardiac examination. \par \par He has had progressively worsening knee pain and is scheduled for knee replacement surgery next month.  He has no new cardiovascular complaints and no angina at current functional status --  able to walk 1 block before stopping because of knee discomfort.

## 2023-06-06 NOTE — PHYSICAL EXAM
[No Acute Distress] : no acute distress [Normal S1, S2] : normal S1, S2 [Clear Lung Fields] : clear lung fields [Normal Gait] : normal gait [No Edema] : no edema [Alert and Oriented] : alert and oriented [de-identified] :  appears his stated age and well

## 2023-06-09 RX ORDER — POLYETHYLENE GLYCOL 3350 17 G/17G
17 POWDER, FOR SOLUTION ORAL AT BEDTIME
Refills: 0 | Status: DISCONTINUED | OUTPATIENT
Start: 2023-06-13 | End: 2023-06-14

## 2023-06-09 RX ORDER — ACETAMINOPHEN 500 MG
975 TABLET ORAL EVERY 8 HOURS
Refills: 0 | Status: DISCONTINUED | OUTPATIENT
Start: 2023-06-13 | End: 2023-06-14

## 2023-06-09 RX ORDER — FOLIC ACID 0.8 MG
1 TABLET ORAL DAILY
Refills: 0 | Status: DISCONTINUED | OUTPATIENT
Start: 2023-06-13 | End: 2023-06-14

## 2023-06-09 RX ORDER — MAGNESIUM HYDROXIDE 400 MG/1
30 TABLET, CHEWABLE ORAL DAILY
Refills: 0 | Status: DISCONTINUED | OUTPATIENT
Start: 2023-06-13 | End: 2023-06-14

## 2023-06-09 RX ORDER — FERROUS SULFATE 325(65) MG
325 TABLET ORAL DAILY
Refills: 0 | Status: DISCONTINUED | OUTPATIENT
Start: 2023-06-13 | End: 2023-06-14

## 2023-06-09 RX ORDER — OXYCODONE HYDROCHLORIDE 5 MG/1
10 TABLET ORAL
Refills: 0 | Status: DISCONTINUED | OUTPATIENT
Start: 2023-06-13 | End: 2023-06-14

## 2023-06-09 RX ORDER — CELECOXIB 200 MG/1
200 CAPSULE ORAL EVERY 12 HOURS
Refills: 0 | Status: DISCONTINUED | OUTPATIENT
Start: 2023-06-14 | End: 2023-06-14

## 2023-06-09 RX ORDER — SODIUM CHLORIDE 9 MG/ML
1000 INJECTION, SOLUTION INTRAVENOUS
Refills: 0 | Status: DISCONTINUED | OUTPATIENT
Start: 2023-06-14 | End: 2023-06-14

## 2023-06-09 RX ORDER — SENNA PLUS 8.6 MG/1
2 TABLET ORAL AT BEDTIME
Refills: 0 | Status: DISCONTINUED | OUTPATIENT
Start: 2023-06-13 | End: 2023-06-14

## 2023-06-09 RX ORDER — ONDANSETRON 8 MG/1
4 TABLET, FILM COATED ORAL EVERY 6 HOURS
Refills: 0 | Status: DISCONTINUED | OUTPATIENT
Start: 2023-06-13 | End: 2023-06-14

## 2023-06-09 RX ORDER — OXYCODONE HYDROCHLORIDE 5 MG/1
5 TABLET ORAL
Refills: 0 | Status: DISCONTINUED | OUTPATIENT
Start: 2023-06-13 | End: 2023-06-14

## 2023-06-09 RX ORDER — HYDROMORPHONE HYDROCHLORIDE 2 MG/ML
0.5 INJECTION INTRAMUSCULAR; INTRAVENOUS; SUBCUTANEOUS EVERY 4 HOURS
Refills: 0 | Status: DISCONTINUED | OUTPATIENT
Start: 2023-06-13 | End: 2023-06-14

## 2023-06-12 RX ORDER — PANTOPRAZOLE SODIUM 20 MG/1
40 TABLET, DELAYED RELEASE ORAL ONCE
Refills: 0 | Status: DISCONTINUED | OUTPATIENT
Start: 2023-06-13 | End: 2023-06-14

## 2023-06-12 RX ORDER — TRANEXAMIC ACID 100 MG/ML
1 INJECTION, SOLUTION INTRAVENOUS ONCE
Refills: 0 | Status: DISCONTINUED | OUTPATIENT
Start: 2023-06-13 | End: 2023-06-14

## 2023-06-13 ENCOUNTER — TRANSCRIPTION ENCOUNTER (OUTPATIENT)
Age: 88
End: 2023-06-13

## 2023-06-13 ENCOUNTER — RESULT REVIEW (OUTPATIENT)
Age: 88
End: 2023-06-13

## 2023-06-13 ENCOUNTER — INPATIENT (INPATIENT)
Facility: HOSPITAL | Age: 88
LOS: 0 days | Discharge: HOME | DRG: 470 | End: 2023-06-14
Attending: ORTHOPAEDIC SURGERY | Admitting: ORTHOPAEDIC SURGERY
Payer: MEDICARE

## 2023-06-13 ENCOUNTER — APPOINTMENT (OUTPATIENT)
Dept: ORTHOPEDIC SURGERY | Facility: HOSPITAL | Age: 88
End: 2023-06-13

## 2023-06-13 VITALS
TEMPERATURE: 98 F | HEART RATE: 96 BPM | RESPIRATION RATE: 16 BRPM | WEIGHT: 160.06 LBS | OXYGEN SATURATION: 98 % | HEIGHT: 64 IN | DIASTOLIC BLOOD PRESSURE: 91 MMHG | SYSTOLIC BLOOD PRESSURE: 150 MMHG

## 2023-06-13 DIAGNOSIS — M17.12 UNILATERAL PRIMARY OSTEOARTHRITIS, LEFT KNEE: ICD-10-CM

## 2023-06-13 LAB
ANION GAP SERPL CALC-SCNC: 5 MMOL/L — SIGNIFICANT CHANGE UP (ref 5–17)
BUN SERPL-MCNC: 20 MG/DL — SIGNIFICANT CHANGE UP (ref 7–23)
CALCIUM SERPL-MCNC: 8.4 MG/DL — LOW (ref 8.5–10.1)
CHLORIDE SERPL-SCNC: 110 MMOL/L — HIGH (ref 96–108)
CO2 SERPL-SCNC: 26 MMOL/L — SIGNIFICANT CHANGE UP (ref 22–31)
CREAT SERPL-MCNC: 1.37 MG/DL — HIGH (ref 0.5–1.3)
EGFR: 50 ML/MIN/1.73M2 — LOW
GLUCOSE BLDC GLUCOMTR-MCNC: 102 MG/DL — HIGH (ref 70–99)
GLUCOSE BLDC GLUCOMTR-MCNC: 105 MG/DL — HIGH (ref 70–99)
GLUCOSE BLDC GLUCOMTR-MCNC: 119 MG/DL — HIGH (ref 70–99)
GLUCOSE SERPL-MCNC: 108 MG/DL — HIGH (ref 70–99)
HCT VFR BLD CALC: 40.2 % — SIGNIFICANT CHANGE UP (ref 39–50)
HGB BLD-MCNC: 13.8 G/DL — SIGNIFICANT CHANGE UP (ref 13–17)
MCHC RBC-ENTMCNC: 31.3 PG — SIGNIFICANT CHANGE UP (ref 27–34)
MCHC RBC-ENTMCNC: 34.3 GM/DL — SIGNIFICANT CHANGE UP (ref 32–36)
MCV RBC AUTO: 91.2 FL — SIGNIFICANT CHANGE UP (ref 80–100)
PLATELET # BLD AUTO: 171 K/UL — SIGNIFICANT CHANGE UP (ref 150–400)
POTASSIUM SERPL-MCNC: 3.4 MMOL/L — LOW (ref 3.5–5.3)
POTASSIUM SERPL-SCNC: 3.4 MMOL/L — LOW (ref 3.5–5.3)
RBC # BLD: 4.41 M/UL — SIGNIFICANT CHANGE UP (ref 4.2–5.8)
RBC # FLD: 13.2 % — SIGNIFICANT CHANGE UP (ref 10.3–14.5)
SODIUM SERPL-SCNC: 141 MMOL/L — SIGNIFICANT CHANGE UP (ref 135–145)
WBC # BLD: 6.7 K/UL — SIGNIFICANT CHANGE UP (ref 3.8–10.5)
WBC # FLD AUTO: 6.7 K/UL — SIGNIFICANT CHANGE UP (ref 3.8–10.5)

## 2023-06-13 PROCEDURE — 73560 X-RAY EXAM OF KNEE 1 OR 2: CPT | Mod: LT

## 2023-06-13 PROCEDURE — 20985 CPTR-ASST DIR MS PX: CPT

## 2023-06-13 PROCEDURE — 99221 1ST HOSP IP/OBS SF/LOW 40: CPT

## 2023-06-13 PROCEDURE — 73560 X-RAY EXAM OF KNEE 1 OR 2: CPT | Mod: 26,LT

## 2023-06-13 PROCEDURE — C1713: CPT

## 2023-06-13 PROCEDURE — 85027 COMPLETE CBC AUTOMATED: CPT

## 2023-06-13 PROCEDURE — 27447 TOTAL KNEE ARTHROPLASTY: CPT | Mod: LT

## 2023-06-13 PROCEDURE — 97530 THERAPEUTIC ACTIVITIES: CPT | Mod: GP

## 2023-06-13 PROCEDURE — 88305 TISSUE EXAM BY PATHOLOGIST: CPT | Mod: 26

## 2023-06-13 PROCEDURE — 82962 GLUCOSE BLOOD TEST: CPT

## 2023-06-13 PROCEDURE — 36415 COLL VENOUS BLD VENIPUNCTURE: CPT

## 2023-06-13 PROCEDURE — 99223 1ST HOSP IP/OBS HIGH 75: CPT

## 2023-06-13 PROCEDURE — 80048 BASIC METABOLIC PNL TOTAL CA: CPT

## 2023-06-13 PROCEDURE — 97116 GAIT TRAINING THERAPY: CPT | Mod: GP

## 2023-06-13 PROCEDURE — C1776: CPT

## 2023-06-13 PROCEDURE — 88305 TISSUE EXAM BY PATHOLOGIST: CPT

## 2023-06-13 PROCEDURE — 97162 PT EVAL MOD COMPLEX 30 MIN: CPT | Mod: GP

## 2023-06-13 RX ORDER — HYDRALAZINE HCL 50 MG
10 TABLET ORAL ONCE
Refills: 0 | Status: COMPLETED | OUTPATIENT
Start: 2023-06-13 | End: 2023-06-13

## 2023-06-13 RX ORDER — ACETAMINOPHEN 500 MG
1 TABLET ORAL
Refills: 0 | DISCHARGE

## 2023-06-13 RX ORDER — POLYETHYLENE GLYCOL 3350 17 G/17G
17 POWDER, FOR SOLUTION ORAL
Qty: 1 | Refills: 0
Start: 2023-06-13 | End: 2023-06-26

## 2023-06-13 RX ORDER — SODIUM CHLORIDE 9 MG/ML
1000 INJECTION, SOLUTION INTRAVENOUS
Refills: 0 | Status: DISCONTINUED | OUTPATIENT
Start: 2023-06-13 | End: 2023-06-13

## 2023-06-13 RX ORDER — ACETAMINOPHEN 500 MG
2 TABLET ORAL
Qty: 84 | Refills: 0
Start: 2023-06-13 | End: 2023-06-26

## 2023-06-13 RX ORDER — CEFAZOLIN SODIUM 1 G
2000 VIAL (EA) INJECTION EVERY 8 HOURS
Refills: 0 | Status: COMPLETED | OUTPATIENT
Start: 2023-06-13 | End: 2023-06-14

## 2023-06-13 RX ORDER — FENTANYL CITRATE 50 UG/ML
50 INJECTION INTRAVENOUS
Refills: 0 | Status: DISCONTINUED | OUTPATIENT
Start: 2023-06-13 | End: 2023-06-13

## 2023-06-13 RX ORDER — DEXAMETHASONE 0.5 MG/5ML
8 ELIXIR ORAL ONCE
Refills: 0 | Status: COMPLETED | OUTPATIENT
Start: 2023-06-14 | End: 2023-06-14

## 2023-06-13 RX ORDER — ONDANSETRON 8 MG/1
4 TABLET, FILM COATED ORAL ONCE
Refills: 0 | Status: DISCONTINUED | OUTPATIENT
Start: 2023-06-13 | End: 2023-06-13

## 2023-06-13 RX ORDER — POTASSIUM CHLORIDE 20 MEQ
40 PACKET (EA) ORAL ONCE
Refills: 0 | Status: COMPLETED | OUTPATIENT
Start: 2023-06-13 | End: 2023-06-13

## 2023-06-13 RX ORDER — HEPARIN SODIUM 5000 [USP'U]/ML
5000 INJECTION INTRAVENOUS; SUBCUTANEOUS ONCE
Refills: 0 | Status: COMPLETED | OUTPATIENT
Start: 2023-06-13 | End: 2023-06-13

## 2023-06-13 RX ORDER — SENNA PLUS 8.6 MG/1
2 TABLET ORAL
Qty: 28 | Refills: 0
Start: 2023-06-13 | End: 2023-06-26

## 2023-06-13 RX ORDER — VALSARTAN 80 MG/1
320 TABLET ORAL DAILY
Refills: 0 | Status: DISCONTINUED | OUTPATIENT
Start: 2023-06-13 | End: 2023-06-14

## 2023-06-13 RX ORDER — PANTOPRAZOLE SODIUM 20 MG/1
1 TABLET, DELAYED RELEASE ORAL
Qty: 30 | Refills: 0
Start: 2023-06-13 | End: 2023-07-12

## 2023-06-13 RX ORDER — LANOLIN ALCOHOL/MO/W.PET/CERES
5 CREAM (GRAM) TOPICAL AT BEDTIME
Refills: 0 | Status: DISCONTINUED | OUTPATIENT
Start: 2023-06-13 | End: 2023-06-14

## 2023-06-13 RX ORDER — OXYCODONE HYDROCHLORIDE 5 MG/1
5 TABLET ORAL ONCE
Refills: 0 | Status: DISCONTINUED | OUTPATIENT
Start: 2023-06-13 | End: 2023-06-13

## 2023-06-13 RX ORDER — METOPROLOL TARTRATE 50 MG
25 TABLET ORAL DAILY
Refills: 0 | Status: DISCONTINUED | OUTPATIENT
Start: 2023-06-13 | End: 2023-06-14

## 2023-06-13 RX ORDER — GLUCOSAMINE/CHONDROITIN/C/MANG 500-400 MG
3 CAPSULE ORAL
Refills: 0 | DISCHARGE

## 2023-06-13 RX ORDER — DOXAZOSIN MESYLATE 4 MG
1 TABLET ORAL AT BEDTIME
Refills: 0 | Status: DISCONTINUED | OUTPATIENT
Start: 2023-06-13 | End: 2023-06-14

## 2023-06-13 RX ORDER — RIVAROXABAN 15 MG-20MG
10 KIT ORAL DAILY
Refills: 0 | Status: DISCONTINUED | OUTPATIENT
Start: 2023-06-14 | End: 2023-06-14

## 2023-06-13 RX ORDER — OXYCODONE HYDROCHLORIDE 5 MG/1
1 TABLET ORAL
Qty: 30 | Refills: 0
Start: 2023-06-13 | End: 2023-06-17

## 2023-06-13 RX ADMIN — OXYCODONE HYDROCHLORIDE 10 MILLIGRAM(S): 5 TABLET ORAL at 21:03

## 2023-06-13 RX ADMIN — Medication 40 MILLIEQUIVALENT(S): at 23:36

## 2023-06-13 RX ADMIN — OXYCODONE HYDROCHLORIDE 10 MILLIGRAM(S): 5 TABLET ORAL at 21:33

## 2023-06-13 RX ADMIN — Medication 100 MILLIGRAM(S): at 21:02

## 2023-06-13 RX ADMIN — Medication 975 MILLIGRAM(S): at 21:03

## 2023-06-13 RX ADMIN — Medication 10 MILLIGRAM(S): at 16:56

## 2023-06-13 RX ADMIN — Medication 1 MILLIGRAM(S): at 21:03

## 2023-06-13 RX ADMIN — HEPARIN SODIUM 5000 UNIT(S): 5000 INJECTION INTRAVENOUS; SUBCUTANEOUS at 22:22

## 2023-06-13 NOTE — CONSULT NOTE ADULT - SUBJECTIVE AND OBJECTIVE BOX
PCP:  Dr Tone Paulino    CHIEF COMPLAINT: left knee OA,      HISTORY OF THE PRESENT ILLNESS: this is a 88 yo male with PMH of AAA, BPH, sigmoid diverticulosis, Prostate Ca S/P seed implants, HTN. 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  He is awake, alert IN NAD, denies any CP or SOB.  We are consulted for medical management    PAST MEDICAL HISTORY: as above    PAST SURGICAL HISTORY:  rhinoplasty, tonsillectomy, prostates seed implantation    FAMILY HISTORY:   father: dec: Lymphoma,  Mother; dec: CAD    SOCIAL HISTORY:  no smoking, no alcohol, no drugs    ALLERGIES: NKDA    HOME MEDS: see med rec    REVIEW OF SYSTEMS:   All 10 systems reviewed in detailed and found to be negative with the exception of what has already been described above    MEDICATIONS  (STANDING):  pantoprazole    Tablet 40 milliGRAM(s) Oral once  tranexamic acid 2% Topical Irrigation 1 Application(s) IntraArticular once        VITALS:  T(F): 98.3 (06-13-23 @ 11:21), Max: 98.3 (06-13-23 @ 11:21)  HR: 96 (06-13-23 @ 11:21) (96 - 96)  BP: 150/91 (06-13-23 @ 11:21) (150/91 - 150/91)  RR: 16 (06-13-23 @ 11:21) (16 - 16)  SpO2: 98% (06-13-23 @ 11:21) (98% - 98%)      CAPILLARY BLOOD GLUCOSE  POCT Blood Glucose.: 102 mg/dL (13 Jun 2023 14:51)  POCT Blood Glucose.: 105 mg/dL (13 Jun 2023 11:16)    PHYSICAL EXAM:    GENERAL: Comfortable, no acute distress   HEAD:  Normocephalic, atraumatic  EYES: EOMI, PERRLA  HEENT: Moist mucous membranes  NECK: Supple, No JVD  NERVOUS SYSTEM:  Alert & Oriented X3, Motor Strength 5/5 B/L upper and lower extremities  CHEST/LUNG: Clear to auscultation bilaterally  HEART: Regular rate and rhythm  ABDOMEN: Soft, non tender, Nondistended, Bowel sounds present  GENITOURINARY: Voiding, no palpable bladder  EXTREMITIES:   No clubbing, cyanosis, or edema  MUSCULOSKELETAL- left knee ace dsg c/d/i  SKIN-no rash    LABS: pending        IMPRESSION: 88 yo male with above pmh a/w:  # OA left knee  # S/P left TKR    POD#0  pain control  PT eval  Bowel regimen  IVF's  Finish ABXs  DASH diet  PPI  VTE prophylaxis  monitor CBC/BMP      # HTN  cont bb, Valsartan, hold hctz  pt at high risk for fluctuations in B/P 2/2 anesthesia, pain, hypovolemia and use of narcotics, placing pt at high risk for MI/CVA  monitor B/P closely  adjust anti-htn's accordingly    # BPH  cont doxazosin  pt at high risk for urinary retention 2/2 anesthesia  monitor I & O closely    # Vte prophylaxis  AC consult  [employer]  INC mobility      Thank you for the consult, will follow         PCP:  Dr Tone Paulino    CHIEF COMPLAINT: left knee OA,      HISTORY OF THE PRESENT ILLNESS: this is a 88 yo male with PMH of AAA, BPH, sigmoid diverticulosis, Prostate Ca S/P seed implants, HTN>  In 2018 pt was admitted for frequent falling and PNA, imaging + for  B/L DVT and B/L Pulmonary embolism on Xarelto 10 mg po daily,  stopped after 6/11 dose,  workup by hematology for hypercoagulopathy but workup was negative, on anticoagulation life long now , and 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 he is still groggy from anesthesia. VSS, in NAD.  We are consulted for medical management    PAST MEDICAL HISTORY: as above    PAST SURGICAL HISTORY:  rhinoplasty, tonsillectomy, prostates seed implantation    FAMILY HISTORY:   father: dec: Lymphoma,  Mother; dec: CAD    SOCIAL HISTORY:  no smoking, no alcohol, no drugs    ALLERGIES: NKDA    HOME MEDS: see med rec    REVIEW OF SYSTEMS:   unable to assess at this time    MEDICATIONS  (STANDING):  pantoprazole    Tablet 40 milliGRAM(s) Oral once  tranexamic acid 2% Topical Irrigation 1 Application(s) IntraArticular once        VITALS:  T(F): 98.3 (06-13-23 @ 11:21), Max: 98.3 (06-13-23 @ 11:21)  HR: 96 (06-13-23 @ 11:21) (96 - 96)  BP: 150/91 (06-13-23 @ 11:21) (150/91 - 150/91)  RR: 16 (06-13-23 @ 11:21) (16 - 16)  SpO2: 98% (06-13-23 @ 11:21) (98% - 98%)      CAPILLARY BLOOD GLUCOSE  POCT Blood Glucose.: 102 mg/dL (13 Jun 2023 14:51)  POCT Blood Glucose.: 105 mg/dL (13 Jun 2023 11:16)    PHYSICAL EXAM:    GENERAL: Comfortable, no acute distress   HEAD:  Normocephalic, atraumatic  EYES: EOMI, PERRLA  HEENT: Moist mucous membranes  NECK: Supple, No JVD  NERVOUS SYSTEM:  groggy, Motor Strength 5/5 B/L upper and lower extremities  CHEST/LUNG: Clear to auscultation bilaterally  HEART: Regular rate and rhythm  ABDOMEN: Soft, non tender, Nondistended, Bowel sounds present  GENITOURINARY: Voiding, no palpable bladder  EXTREMITIES:   No clubbing, cyanosis, or edema  MUSCULOSKELETAL- left knee ace dsg c/d/i  SKIN-no rash    LABS: pending        IMPRESSION: 88 yo male with above pmh a/w:  # OA left knee  # S/P left TKR    POD#0  pain control  PT eval  Bowel regimen  IVF's  Finish ABXs  DASH diet  PPI  VTE prophylaxis  monitor CBC/BMP      # HTN  cont bb, Valsartan, hold hctz  pt at high risk for fluctuations in B/P 2/2 anesthesia, pain, hypovolemia and use of narcotics, placing pt at high risk for MI/CVA  monitor B/P closely  adjust anti-htn's accordingly    # BPH  cont doxazosin  pt at high risk for urinary retention 2/2 anesthesia  monitor I & O closely    # Vte prophylaxis/ H/O B/L DVT and PE's   AC consult: resume xarelto  Venodynes  INC mobility      Thank you for the consult, will follow

## 2023-06-13 NOTE — DISCHARGE NOTE PROVIDER - NSDCFUSCHEDAPPT_GEN_ALL_CORE_FT
[facility]  [address] [facility]  Scheduled Appointment: 07/12/2023

## 2023-06-13 NOTE — DISCHARGE NOTE PROVIDER - HOSPITAL COURSE
H&P:  Pt is a 87y Male   Multiple PMHx     Now s/p Left Total Knee Arthroplasty. Pt is afebrile with stable vital signs. Pain is controlled. Alert and Oriented. Exam reveals intact EHL FHL TA GS, +DP. Dressing is clean and dry.    Hospital Course:  Patient presented to [facility] medically cleared for elective Left Total Knee Replacement Surgery, having failed outpatient conservative management. Prophylactic antibiotics were started before the procedure and continued for 24 hours. They were admitted after surgery to the orthopedic floor. There were no complications during the hospital stay. All home medications were continued.     **Pt received **U PRBC post op for Acute Blood Loss Anemia.    Routine consults were obtained from the Anticoagulation Team for DVT/PE prophylaxis, from Physical Therapy for twice daily PT, and from the Hospitalist for Medical Co-management. Patient was placed on anticoagulation. Pertinent home medications were continued. Daily labs were followed.      On POD 0 pt was stable overnight. No major events post-op. Pt received twice daily PT. The plan is for DC to home with home PT** or to Rehab for ongoing PT**. The orthopedic Attending is aware and agrees.      **POD1 DC DOCUMENTAION** (Keep or Delete depending on scenario)  The patient had no post-operative complications and clinically progressed faster than anticipated. The following condition(s) were actively treated during the hospital stay:  HTN  BPH  Hx Prostate Cancer  Hx PE  The patient met criteria for discharge before the 2nd night of the stay. The patient was appropriately and safely discharged home with home PT.    ******INCOMPLETE NOTE IN PREPARATION FOR DISPO PLANNING*******  ******INCOMPLETE NOTE IN PREPARATION FOR DISPO PLANNING*******  ******INCOMPLETE NOTE IN PREPARATION FOR DISPO PLANNING*******     H&P:  Pt is a 87y Male   PAST MEDICAL & SURGICAL HISTORY:  History of atypical nevus      Deviated nasal septum      Rhinitis      Sensorineural hearing loss  bilateral      HTN (hypertension)      History of prostate cancer  seed implant 01/2004      BPH (Benign Prostatic Hyperplasia)      Tubular adenoma      Diverticulosis  sigmoid      Pulmonary embolism      OA (osteoarthritis)      Ascending aortic aneurysm      H/O sepsis      Dilated aortic root      Tricuspid regurgitation      History of rhinoplasty      History of tonsillectomy      Mastoid disorder      Prostate ca  with seed implantation 1/2004      S/P cataract surgery  left eye      Now s/p Left Total Knee Arthroplasty. Pt is afebrile with stable vital signs. Pain is controlled. Alert and Oriented. Exam reveals intact EHL FHL TA GS, +DP. Dressing is clean and dry.    Hospital Course:  Patient presented to [facility] medically cleared for elective Left Total Knee Replacement Surgery, having failed outpatient conservative management. Prophylactic antibiotics were started before the procedure and continued for 24 hours. They were admitted after surgery to the orthopedic floor. There were no complications during the hospital stay. All home medications were continued.     **Pt received **U PRBC post op for Acute Blood Loss Anemia.    Routine consults were obtained from the Anticoagulation Team for DVT/PE prophylaxis, from Physical Therapy for twice daily PT, and from the Hospitalist for Medical Co-management. Patient was placed on anticoagulation. Pertinent home medications were continued. Daily labs were followed.      On POD 0 pt was stable overnight. No major events post-op. Pt received twice daily PT. The plan is for DC to home with home PT** or to Rehab for ongoing PT**. The orthopedic Attending is aware and agrees.      **POD1 DC DOCUMENTAION** (Keep or Delete depending on scenario)  The patient had no post-operative complications and clinically progressed faster than anticipated. The following condition(s) were actively treated during the hospital stay:  HTN  BPH  Hx Prostate Cancer  Hx PE  The patient met criteria for discharge before the 2nd night of the stay. The patient was appropriately and safely discharged home with home PT.    ******INCOMPLETE NOTE IN PREPARATION FOR DISPO PLANNING*******  ******INCOMPLETE NOTE IN PREPARATION FOR DISPO PLANNING*******  ******INCOMPLETE NOTE IN PREPARATION FOR DISPO PLANNING*******     H&P:  Pt is a 87y Male   PAST MEDICAL & SURGICAL HISTORY:  History of atypical nevus      Deviated nasal septum      Rhinitis      Sensorineural hearing loss  bilateral      HTN (hypertension)      History of prostate cancer  seed implant 01/2004      BPH (Benign Prostatic Hyperplasia)      Tubular adenoma      Diverticulosis  sigmoid      Pulmonary embolism      OA (osteoarthritis)      Ascending aortic aneurysm      H/O sepsis      Dilated aortic root      Tricuspid regurgitation      History of rhinoplasty      History of tonsillectomy      Mastoid disorder      Prostate ca  with seed implantation 1/2004      S/P cataract surgery  left eye      Now s/p Left Total Knee Arthroplasty. Pt is afebrile with stable vital signs. Pain is controlled. Alert and Oriented. Exam reveals intact EHL FHL TA GS, +DP. Dressing is clean and dry.    Hospital Course:  Patient presented to [facility] medically cleared for elective Left Total Knee Replacement Surgery, having failed outpatient conservative management. Prophylactic antibiotics were started before the procedure and continued for 24 hours. They were admitted after surgery to the orthopedic floor. There were no complications during the hospital stay. All home medications were continued.     Routine consults were obtained from the Anticoagulation Team for DVT/PE prophylaxis, from Physical Therapy for twice daily PT, and from the Hospitalist for Medical Co-management. Patient was placed on anticoagulation. Pertinent home medications were continued. Daily labs were followed.      On POD 0 pt was stable overnight. No major events post-op. Pt received twice daily PT. The plan is for DC to home with home PT. The orthopedic Attending is aware and agrees.      The patient had no post-operative complications and clinically progressed faster than anticipated. The following condition(s) were actively treated during the hospital stay:  HTN  BPH  Hx Prostate Cancer  Hx PE  The patient met criteria for discharge before the 2nd night of the stay. The patient was appropriately and safely discharged home with home PT.

## 2023-06-13 NOTE — PHYSICAL THERAPY INITIAL EVALUATION ADULT - GAIT TRAINING, PT EVAL
LTG: Amb ~400ft with least restrictive assistive device (RW to cane R hand) independently with good  gait quality (2 weeks)

## 2023-06-13 NOTE — PHYSICAL THERAPY INITIAL EVALUATION ADULT - GAIT DEVIATIONS NOTED, PT EVAL
mild flexion L knee on stance phase , cues for pt to focus on knee extension early stance phase (no buckling/collapse of knee )

## 2023-06-13 NOTE — CONSULT NOTE ADULT - ASSESSMENT
Patient is a 87y old  Male with PMH of PE, prostate cancer on xarelto 10mg daily held 48 hours prior to surgery  who presents with a chief complaint of left knee pain now s/p  planned left TKR (13 Jun 2023 15:01). Consulted by Dr.Scott Badillo    for VTE prophylaxis, risk stratification, and anticoagulation management. patient is high risk for VTE and moderate risk for Bleeding.    Plan  Heparin 5000units SQ tonight  Start Xarelto 10 mg po daily start from tomorrow   Daily CBC, BMP  BLE venodynes  INC mobility as [place]rett    Thank you for the Consult, will follow

## 2023-06-13 NOTE — PATIENT PROFILE ADULT - FALL HARM RISK - HARM RISK INTERVENTIONS

## 2023-06-13 NOTE — PHYSICAL THERAPY INITIAL EVALUATION ADULT - PATIENT/FAMILY/SIGNIFICANT OTHER GOALS STATEMENT, PT EVAL
pt eager to participate and wants to know when he will be able to bend his knee and be able to climb the stairs

## 2023-06-13 NOTE — CONSULT NOTE ADULT - SUBJECTIVE AND OBJECTIVE BOX
HPI:      Patient is a 87y old  Male with PMH of PE, prostate cancer on xarelto 10mg daily held 48 hours prior to surgery  who presents with a chief complaint of left knee pain now s/p  planned left TKR (2023 15:01). Consulted by Dr.Scott Badillo    for VTE prophylaxis, risk stratification, and anticoagulation management.    PAST MEDICAL & SURGICAL HISTORY:  History of atypical nevus      Deviated nasal septum      Rhinitis      Sensorineural hearing loss  bilateral      HTN (hypertension)      History of prostate cancer  seed implant 2004      BPH (Benign Prostatic Hyperplasia)      Tubular adenoma      Diverticulosis  sigmoid      Pulmonary embolism      OA (osteoarthritis)      Ascending aortic aneurysm      H/O sepsis      Dilated aortic root      Tricuspid regurgitation      History of rhinoplasty      History of tonsillectomy      Mastoid disorder      Prostate ca  with seed implantation 2004      S/P cataract surgery  left eye    Interval history  2023:patient seen at bedside on PACU discussed the resumption of anticoagulation patient is on Xarelto 10mg for Previous h/o PE will give one dose of Heparin tonight and will resume xarelto tomorrow patient in agreement with the plan.       CrCl:  BMI:27.1    Caprini VTE Risk Score:  AGE RELATED RISK FACTORS                                                       MOBILITY RELATED FACTORS  [ ] Age 41-60 years                                            (1 Point)                  [ ] Bed rest /restricted mobility                             (1 Point)  [ ] Age: 61-74 years                                           (2 Points)                [ ] Plaster cast                                                   (2 Points)  [x ] Age= 75 years                                              (3 Points)                 [ ] Bed bound for more than 72 hours                   (2 Points)    DISEASE RELATED RISK FACTORS                                               GENDER SPECIFIC FACTORS  [ ] Edema in the lower extremities                       (1 Point)           [ ] Pregnancy                                                            (1 Point)  [ ] Varicose veins                                               (1 Point)                  [ ] Post-partum < 6 weeks                                      (1 Point)             [ x] BMI > 25 Kg/m2                                            (1 Point)                  [ ] Hormonal therapy or oral contraception       (1 Point)                 [ ] Sepsis (in the previous month)                        (1 Point)             [ ] History of pregnancy complications                (1Point)  [ ] Pneumonia or serious lung disease                                             [ ] Unexplained or recurrent  (=/>3), premature                                 (In the previous month)                               (1 Point)                birth with toxemia or growth-restricted infant (1 Point)  [ ] Abnormal pulmonary function test            (1 Point)                                   SURGERY RELATED RISK FACTORS  [ ] Acute myocardial infarction                       (1 Point)                  [ ]  Section                                         (1 Point)  [ ] Congestive heart failure (in the previous month) (1 Point)   [ ] Minor surgery   lasting <45 minutes       (1 Point)   [ ] Inflammatory bowel disease                             (1 Point)          [ ] Arthroscopic surgery                                  (2 Points)  [ ] Central venous access                                    (2 Points)            [ ] General surgery lasting >45 minutes      (2 Points)       [ ] Stroke (in the previous month)                  (5 Points)            [ x] Elective major lower extremity arthroplasty (5 Points)                                   [ x ] Malignancy (present or past include skin melanoma                                          but exclude  basal skin cell)    (2 points)                                      TRAUMA RELATED RISK FACTORS                HEMATOLOGY RELATED FACTORS                                  [ ] Fracture of the hip, pelvis, or leg                       (5 Points)  [x ] Prior episodes of VTE                                     (3 Points)          [ ] Acute spinal cord injury (in the previous month)  (5 Points)  [ ] Positive family history for VTE                         (3 Points)       [ ] Paralysis (less than 1 month)                          (5 Points)  [ ] Prothrombin 51141 A                                      (3 Points)         [ ] Multiple Trauma (within 1month)                 (5Points)                                                                                                                                                                [ ] Factor V Leiden                                          (3 Points)                                OTHER RISK FACTORS                          [ ] Lupus anticoagulants                                     (3 Points)                       [ ] BMI > 40                          (1 Point)                                                         [ ] Anticardiolipin antibodies                                (3 Points)                   [ ] Smoking                              (1Point)                                                [ ] High homocysteine in the blood                      (3 Points)                [  ] Diabetes requiring insulin (1point)                         [ ] Other congenital or acquired thrombophilia       (3 Points)          [  ] Chemotherapy                   (1 Point)  [ ] Heparin induced thrombocytopenia                  (3 Points)             [  ] Blood Transfusion                (1 point)                                                                                                             Total Score [  14        ]                                                                                                                                                                                                                                     IMPROVE Bleeding Risk Score:6      Falls Risk:   High ( x )  Mod (  )  Low (  )      FAMILY HISTORY:  FH: bladder cancer (Sibling)    FH: coronary artery disease (Mother)    FH: lymphoma (Father)      Denies any personal or familial history of clotting or bleeding disorders.    Allergies    adhesives (Rash)  No Known Drug Allergies    Intolerances        REVIEW OF SYSTEMS    (  )Fever	     (  )Constipation	(  )SOB				(  )Headache	(  )Dysuria  (  )Chills	     (  )Melena	(  )Dyspnea present on exertion	                    (  )Dizziness                    (  )Polyuria  (  )Nausea	     (  )Hematochezia	(  )Cough			                    (  )Syncope   	(  )Hematuria  (  )Vomiting    (  )Chest Pain	(  )Wheezing			(  )Weakness  (  )Diarrhea     (  )Palpitations	(  )Anorexia			( x )joint pain    All  other review of systems negative: Yes    Vital Signs Last 24 Hrs  T(C): 36.4 (2023 14:50), Max: 36.8 (2023 11:21)  T(F): 97.5 (2023 14:50), Max: 98.3 (2023 11:21)  HR: 77 (2023 15:05) (74 - 96)  BP: 131/83 (2023 15:05) (77/54 - 150/91)  BP(mean): --  RR: 16 (2023 15:05) (16 - 18)  SpO2: 96% (2023 15:05) (96% - 98%)  PHYSICAL EXAM:    Constitutional: Appears Well    Neurological: A& O x 3    Skin: Warm    Respiratory and Thorax: normal effort; Breath sounds: normal; No rales/wheezing/rhonchi  	  Cardiovascular: S1, S2, regular, NMBR	    Gastrointestinal: BS + x 4Q, nontender	    Genitourinary:  Bladder nondistended, nontender    Musculoskeletal:   General Right:   no muscle/joint tenderness,   normal tone, no joint swelling,   ROM: full	    General Left:   + muscle/joint tenderness,   normal tone, no joint swelling,   ROM: limited      Knee:    Left: Incision: ; Dressing CDI;       Lower extrems:   Right: no calf tenderness              negative arnie's sign               + pedal pulses    Left:   no calf tenderness              negative arnie's sign               + pedal pulses      MEDICATIONS  (STANDING):  heparin   Injectable 5000 Unit(s) SubCutaneous once  lactated ringers. 1000 milliLiter(s) (125 mL/Hr) IV Continuous <Continuous>  pantoprazole    Tablet 40 milliGRAM(s) Oral once  tranexamic acid 2% Topical Irrigation 1 Application(s) IntraArticular once          DVT Prophylaxis:  LMWH                   (  )  Heparin SQ           ( x )  Coumadin             (  )  Xarelto                  (  )  Eliquis                   (  )  Venodynes           (x  )  Ambulation          (x  )  UFH                       (  )  Contraindicated  (  )  EC ASPIRIN       (  )

## 2023-06-13 NOTE — DISCHARGE NOTE PROVIDER - NSDCMRMEDTOKEN_GEN_ALL_CORE_FT
doxazosin 4 mg oral tablet: 1 orally once a day (at bedtime)  Glucosamine Chondroitin oral capsule: 3 orally once a day  metoprolol succinate 25 mg oral capsule, extended release: 1 orally once a day  Tylenol 325 mg oral capsule: 1 orally once a day  valsartan-hydrochlorothiazide 320 mg-25 mg oral tablet: 1 orally once a day  Vitamin B complex:   Xarelto 10 mg oral tablet: 1 orally once a day   doxazosin 4 mg oral tablet: 1 orally once a day (at bedtime)  metoprolol succinate 25 mg oral capsule, extended release: 1 orally once a day  MiraLax oral powder for reconstitution: 17 gram(s) orally once a day as needed for  constipation  oxyCODONE 5 mg oral tablet: 1 tab(s) orally every 4 hours as needed for  severe pain MDD: 6  Protonix 40 mg oral delayed release tablet: 1 tab(s) orally once a day  Senna 8.6 mg oral tablet: 2 tab(s) orally once a day (at bedtime) as needed for  constipation  Tylenol Extra Strength 500 mg oral tablet: 2 tab(s) orally every 8 hours  valsartan-hydrochlorothiazide 320 mg-25 mg oral tablet: 1 orally once a day  Vitamin B complex:   Xarelto 10 mg oral tablet: 1 orally once a day

## 2023-06-13 NOTE — DISCHARGE NOTE PROVIDER - NSDCFUADDINST_GEN_ALL_CORE_FT
Discharge Instructions for Left Total Knee Arthroplasty:    1. ACTIVITY: WBAT, Rolling walker, Daily PT. Gentle ROM 0-full as tolerated. Walk plenty.  Knee Immobilizer at night time only for 3 weeks.  2. CALL FOR: fever over 101, wound redness, drainage or open area, calf pain/calf swelling.  3. BANDAGE: Change dressing to a new Mepilex Ag bandage POD7 (6/20/23). May change sooner if dressing saturated or falling off. DO NOT REMOVE BANDAGE TO CHECK WOUND ON INTAKE.  4. SHOWER: Okay to shower. Do not soak, submerge or let shower stream beat on dressing/wound.  5. STAPLES: RN Remove Staples POD14 (6/27/23).  6. DVT PE PROPHYLAXIS: Managed by Anticoag Team. See Med Rec.  7. GI: Continue Protonix daily while on Anticoagulant. an eRx has been sent to your pharmacy.  8. LABS:  INR if on Coumadin.  9. FOLLOW UP: Dr. Badillo in 1 month. Call to schedule.  10. MEDICATIONS:  If going home, eRX sent to your pharmacy for .   11.**Call office if medications not covered under your insurance, especially BLOOD CLOT PREVENTION/anticoagulant medication.

## 2023-06-13 NOTE — INPATIENT CERTIFICATION FOR MEDICARE PATIENTS - IN ORDER TO MEET MEDICARE REQUIREMENTS.
In order to meet Medicare requirements, the clinical documentation must support the information cited in the admission order.  Please be sure to provide detailed and clear documentation about the following in the admitting note/history and physical:
no chest pain/no diaphoresis/no body aches/no chills/no fever/no wheezing/no cough/no edema/no headache/no hemoptysis

## 2023-06-13 NOTE — PATIENT PROFILE ADULT - NSTOBACCO TYPE_GEN_A_CORE_RD
Left msg her CBC is normal. She has not lost a lot of blood. Please call us if she continues to have rectal bleeding. We may need to set up a colonoscopy. Will check with Dr. Rios.     Component      Latest Ref Rng & Units 3/27/2019   WBC      4.2 - 11.0 K/mcL 8.5   RBC      4.00 - 5.20 mil/mcL 4.75   HGB      12.0 - 15.5 g/dL 14.2   HCT      36.0 - 46.5 % 44.4   MCV      78.0 - 100.0 fl 93.5   MCH      26.0 - 34.0 pg 29.9   MCHC      32.0 - 36.5 g/dL 32.0   RDW-CV      11.0 - 15.0 % 12.9   PLT      140 - 450 K/mcL 226   NRBC      0 /100 WBC 0   DIFFERENTIAL TYPE       AUTOMATED DIFFERENTIAL   Neutrophil      % 70   LYMPH      % 22   MONO      % 6   EOSIN      % 1   BASO      % 1   Percent Immature Granuloctyes      % 0   Absolute Neutrophil      1.8 - 7.7 K/mcL 6.0   Absolute Lymph      1.0 - 4.0 K/mcL 1.9   Absolute Mono      0.3 - 0.9 K/mcL 0.5   Absolute Eos      0.1 - 0.5 K/mcL 0.1   Absolute Baso      0.0 - 0.3 K/mcL 0.1   Absolute Immature Granulocytes      0 - 0.2 K/mcl 0.0     Component      Latest Ref Rng & Units 1/21/2018 1/28/2018 3/27/2019   WBC      4.2 - 11.0 K/mcL 9.2 8.0 8.5   RBC      4.00 - 5.20 mil/mcL 4.67 4.88 4.75   HGB      12.0 - 15.5 g/dL 14.2 14.9 14.2   HCT      36.0 - 46.5 % 43.7 44.9 44.4   MCV      78.0 - 100.0 fl 93.6 92.0 93.5   MCH      26.0 - 34.0 pg 30.4 30.5 29.9   MCHC      32.0 - 36.5 g/dL 32.5 33.2 32.0   RDW-CV      11.0 - 15.0 % 13.1 13.1 12.9   PLT      140 - 450 K/mcL 204 214 226     April 2011: EGD and colonoscopy   ASSESSMENT:   1. Normal esophagogastroduodenoscopy.   2. Two colonic polyps removed.   3. Diverticulosis coli.   4. Internal hemorrhoids.   RECOMMENDATIONS:   1. Continue proton pump inhibitor since she has gotten symptomatic relief   of her reflux and epigastric pain.   2. High fiber diet.   3. Await pathology of the polyps. If hyperplastic, repeat in 10 years.   If adenomatous, repeat in 5 years.   Pathologic Diagnosis :   SIGMOID COLON, ENDOSCOPIC BIOPSY:    HYPERPLASTIC POLYP.    Cigarettes

## 2023-06-13 NOTE — DISCHARGE NOTE PROVIDER - CARE PROVIDER_API CALL
JETHRO QUIROS  [address] [place] [address]  Phone: [phone]  Follow Up Time:

## 2023-06-13 NOTE — PHYSICAL THERAPY INITIAL EVALUATION ADULT - ACTIVE RANGE OF MOTION EXAMINATION, REHAB EVAL
L TKR 0 to ~90 degrees limited in part due to bandaging/post-op edema/zehra. upper extremity Active ROM was WNL (within normal limits)/bilateral  lower extremity Active ROM was WFL (within functional limits)

## 2023-06-13 NOTE — PHYSICAL THERAPY INITIAL EVALUATION ADULT - PLANNED THERAPY INTERVENTIONS, PT EVAL
stair training, car transfers ,pain/edema relieving modalities L knee/bed mobility training/gait training/ROM/strengthening/transfer training

## 2023-06-13 NOTE — PHYSICAL THERAPY INITIAL EVALUATION ADULT - NSACTIVITYREC_GEN_A_PT
out of bed to chair/toilet with commode chair PRN ,amb with RW and 1PA FWBAT LLE ,ICE L knee 20-30 mins few times daily,elevate LLE in full passive knee EXTENSION while sitting up out of bed in chair .Twice daily PT,stair training, car transfers

## 2023-06-13 NOTE — PHYSICAL THERAPY INITIAL EVALUATION ADULT - GENERAL OBSERVATIONS, REHAB EVAL
resting supine in PACU on stretcher with O2 via NC 2L/min ,B Flowtrons, L knee ACE wrapped without any staining, awake,alert ,Ox4

## 2023-06-13 NOTE — ASU PREOP CHECKLIST - DENTURES
EVENT:   - Brief HPI:   89 yo F from home lives with daughter PMH HTN , HLD , hypothyroidism , TIA (few weeks ago) presented with episode of confusion associated with headache, dizziness and vertigo since AM.  Patient was given Tylenol and meclizine with partial relief in symptoms. Patient ASAOX3 at th etime of my evaluation. Per daughter patient usually gets confused with UTI.  Denies nausea, vomiting, diarrhea, abdominal pain, SOB, chest pain, GARZA, palpitations, dizziness, cough, wheezing, joint pain or swelling, fever, chills. Admitted with encephalopathy & UTI.    - Received text page from RN that pt is c/o of insomnia. Stated he's not been able to sleep for the past 2 nights.        OBJECTIVE:  Vital Signs Last 24 Hrs  T(C): 36.4 (30 Aug 2020 16:56), Max: 36.4 (30 Aug 2020 00:23)  T(F): 97.6 (30 Aug 2020 16:56), Max: 97.6 (30 Aug 2020 16:56)  HR: 61 (30 Aug 2020 16:56) (52 - 61)  BP: 138/57 (30 Aug 2020 16:56) (138/57 - 180/74)  BP(mean): --  RR: 16 (30 Aug 2020 16:56) (16 - 18)  SpO2: 98% (30 Aug 2020 16:56) (98% - 100%)    FOCUSED PHYSICAL EXAM:  Neuro: awake, alert, oriented x 3. No neuro deficit  Cardiovascular: Pulses +2 B/L in lower and upper extremities, HR regular, BP stable, No edema.  Respiratory: Respirations regular, unlabored, breath sounds clear B/L.   GI: Abdomen soft, non-tender, positive bowel sounds.  : no bladder distention noted. No complaints at this time.  Skin: Dry, intact, no bruising, no diaphoresis.    LABS:                        12.6   5.20  )-----------( 109      ( 29 Aug 2020 06:53 )             38.0     08-29    143  |  111<H>  |  11  ----------------------------<  89  3.4<L>   |  25  |  0.88    Ca    9.1      29 Aug 2020 06:53  Phos  3.4     08-29    TPro  6.4  /  Alb  3.2<L>  /  TBili  0.6  /  DBili  x   /  AST  25  /  ALT  24  /  AlkPhos  60  08-29      EKG:   IMAGING:      ASSESSMENT/Problem: Insomnia probably 2/2 to hospitalization??      PLAN:   1. Melatonin 1mg, PO, Qhs, PRN, insomnia ordered  2. Discourage pt from taking daytime naps  3. Cont present care/treatment no

## 2023-06-13 NOTE — PHYSICAL THERAPY INITIAL EVALUATION ADULT - SKIN INTEGRITY
surgical incision L knee not visualized as dressed with Mepilex dressing and covered/bandaged with elastic wraps/surgical incision

## 2023-06-13 NOTE — CONSULT NOTE ADULT - NS ATTEND AMEND GEN_ALL_CORE FT
Pt seen and examined. DW NP Astrid Kuo. Agree with documentation as above with changes made where appropriate.   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   -pain control  -physical therapy  -incentive spirometry  -bowel regimen.  -resume a/c asap.   -continue arb,BB with hold parameters  -hold hctz.   -f/u post op labs.     thanks, will follow.

## 2023-06-13 NOTE — PATIENT PROFILE ADULT - FUNCTIONAL ASSESSMENT - DAILY ACTIVITY 2.
Blood pressure (!) 139/91, pulse 98, height 6' 2\" (1.88 m), weight 283 lb (128.4 kg). Presents today following up for laparoscopic appendectomy. Has been eating and having bowel movements. No fevers.   He is back at work his job is now not physically
4 = No assist / stand by assistance

## 2023-06-13 NOTE — PHYSICAL THERAPY INITIAL EVALUATION ADULT - MODALITIES TREATMENT COMMENTS
Instructed/reviewed all ex's per TKR protocol in supine including :APs,QS, heelslides/leg press , SAQs over towel bolster , Abduction/Adduction ; ICE bag replaced to anterior L knee after Rx and mirta Cho replaced peding transport to  ORTH[facility]

## 2023-06-13 NOTE — PHYSICAL THERAPY INITIAL EVALUATION ADULT - ADDITIONAL COMMENTS
pt enters home thru garage to basement and climbs 1 flight of stairs up from basement to main living area/first floor of home (bedroom,bathroom,LR, kitchen all on this level) Pt avoids front entrance to home as there are  many tiers of steps ie- 3 steps /walkway, 3 steps ,walkway etc)

## 2023-06-13 NOTE — PHYSICAL THERAPY INITIAL EVALUATION ADULT - LEVEL OF INDEPENDENCE: STAND/SIT, REHAB EVAL
returned to sittin on stretcher, appeared pale, and was minimally dyspneic after ambulating so O2 replaced 2L/min/contact guard

## 2023-06-13 NOTE — PHYSICAL THERAPY INITIAL EVALUATION ADULT - PERTINENT HX OF CURRENT PROBLEM, REHAB EVAL
OA/DJD L knee with progressive pain/debility having failed outpatient conservative measures presents for elective L TKR with Dr MARTI Badillo

## 2023-06-13 NOTE — DISCHARGE NOTE PROVIDER - NSDCCPCAREPLAN_GEN_ALL_CORE_FT
PRINCIPAL DISCHARGE DIAGNOSIS  Diagnosis: Primary osteoarthritis of left knee  Assessment and Plan of Treatment:

## 2023-06-14 ENCOUNTER — TRANSCRIPTION ENCOUNTER (OUTPATIENT)
Age: 88
End: 2023-06-14

## 2023-06-14 VITALS — SYSTOLIC BLOOD PRESSURE: 161 MMHG | DIASTOLIC BLOOD PRESSURE: 83 MMHG | HEART RATE: 89 BPM

## 2023-06-14 LAB
ANION GAP SERPL CALC-SCNC: 7 MMOL/L — SIGNIFICANT CHANGE UP (ref 5–17)
BUN SERPL-MCNC: 19 MG/DL — SIGNIFICANT CHANGE UP (ref 7–23)
CALCIUM SERPL-MCNC: 8.4 MG/DL — LOW (ref 8.5–10.1)
CHLORIDE SERPL-SCNC: 110 MMOL/L — HIGH (ref 96–108)
CO2 SERPL-SCNC: 23 MMOL/L — SIGNIFICANT CHANGE UP (ref 22–31)
CREAT SERPL-MCNC: 1.23 MG/DL — SIGNIFICANT CHANGE UP (ref 0.5–1.3)
EGFR: 57 ML/MIN/1.73M2 — LOW
GLUCOSE SERPL-MCNC: 125 MG/DL — HIGH (ref 70–99)
HCT VFR BLD CALC: 39.9 % — SIGNIFICANT CHANGE UP (ref 39–50)
HGB BLD-MCNC: 13.8 G/DL — SIGNIFICANT CHANGE UP (ref 13–17)
MCHC RBC-ENTMCNC: 31.6 PG — SIGNIFICANT CHANGE UP (ref 27–34)
MCHC RBC-ENTMCNC: 34.6 GM/DL — SIGNIFICANT CHANGE UP (ref 32–36)
MCV RBC AUTO: 91.3 FL — SIGNIFICANT CHANGE UP (ref 80–100)
PLATELET # BLD AUTO: 161 K/UL — SIGNIFICANT CHANGE UP (ref 150–400)
POTASSIUM SERPL-MCNC: 3.3 MMOL/L — LOW (ref 3.5–5.3)
POTASSIUM SERPL-SCNC: 3.3 MMOL/L — LOW (ref 3.5–5.3)
RBC # BLD: 4.37 M/UL — SIGNIFICANT CHANGE UP (ref 4.2–5.8)
RBC # FLD: 13.3 % — SIGNIFICANT CHANGE UP (ref 10.3–14.5)
SODIUM SERPL-SCNC: 140 MMOL/L — SIGNIFICANT CHANGE UP (ref 135–145)
WBC # BLD: 12.08 K/UL — HIGH (ref 3.8–10.5)
WBC # FLD AUTO: 12.08 K/UL — HIGH (ref 3.8–10.5)

## 2023-06-14 PROCEDURE — 99232 SBSQ HOSP IP/OBS MODERATE 35: CPT

## 2023-06-14 PROCEDURE — 99231 SBSQ HOSP IP/OBS SF/LOW 25: CPT

## 2023-06-14 RX ORDER — POTASSIUM CHLORIDE 20 MEQ
20 PACKET (EA) ORAL ONCE
Refills: 0 | Status: COMPLETED | OUTPATIENT
Start: 2023-06-14 | End: 2023-06-14

## 2023-06-14 RX ORDER — POTASSIUM CHLORIDE 20 MEQ
40 PACKET (EA) ORAL ONCE
Refills: 0 | Status: COMPLETED | OUTPATIENT
Start: 2023-06-14 | End: 2023-06-14

## 2023-06-14 RX ADMIN — RIVAROXABAN 10 MILLIGRAM(S): KIT at 09:33

## 2023-06-14 RX ADMIN — Medication 325 MILLIGRAM(S): at 09:33

## 2023-06-14 RX ADMIN — Medication 5 MILLIGRAM(S): at 00:00

## 2023-06-14 RX ADMIN — Medication 101.6 MILLIGRAM(S): at 05:18

## 2023-06-14 RX ADMIN — Medication 25 MILLIGRAM(S): at 09:32

## 2023-06-14 RX ADMIN — CELECOXIB 200 MILLIGRAM(S): 200 CAPSULE ORAL at 09:32

## 2023-06-14 RX ADMIN — Medication 1 TABLET(S): at 09:32

## 2023-06-14 RX ADMIN — OXYCODONE HYDROCHLORIDE 10 MILLIGRAM(S): 5 TABLET ORAL at 02:09

## 2023-06-14 RX ADMIN — Medication 100 MILLIGRAM(S): at 06:36

## 2023-06-14 RX ADMIN — Medication 20 MILLIEQUIVALENT(S): at 09:33

## 2023-06-14 RX ADMIN — VALSARTAN 320 MILLIGRAM(S): 80 TABLET ORAL at 09:32

## 2023-06-14 RX ADMIN — Medication 40 MILLIEQUIVALENT(S): at 11:05

## 2023-06-14 RX ADMIN — Medication 1 MILLIGRAM(S): at 09:32

## 2023-06-14 RX ADMIN — OXYCODONE HYDROCHLORIDE 10 MILLIGRAM(S): 5 TABLET ORAL at 01:39

## 2023-06-14 NOTE — DISCHARGE NOTE NURSING/CASE MANAGEMENT/SOCIAL WORK - NSDCPEFALRISK_GEN_ALL_CORE
For information on Fall & Injury Prevention, visit: [email or web address] OR  [email or web address] OR  https://www.cdc.gov/steadi/patient.html

## 2023-06-14 NOTE — PROGRESS NOTE ADULT - ASSESSMENT
Patient is a 87y old  Male with PMH of PE, prostate cancer on xarelto 10mg daily held 48 hours prior to surgery  who presents with a chief complaint of left knee pain now s/p  planned left TKR (13 Jun 2023 15:01). Consulted by Dr.Scott Badillo    for VTE prophylaxis, risk stratification, and anticoagulation management. patient is high risk for VTE and moderate risk for Bleeding.    Plan  Start Xarelto 10 mg po daily   Daily CBC, BMP  BLE venodynes  INC mobility as everett  will follow  dispo home

## 2023-06-14 NOTE — PROGRESS NOTE ADULT - SUBJECTIVE AND OBJECTIVE BOX
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 he is still groggy from anesthesia. VSS, in NAD.  We are consulted for medical management    PAST MEDICAL HISTORY: as above    PAST SURGICAL HISTORY:  rhinoplasty, tonsillectomy, prostates seed implantation    FAMILY HISTORY:   father: dec: Lymphoma,  Mother; dec: CAD    SOCIAL HISTORY:  no smoking, no alcohol, no drugs    ALLERGIES: NKDA    HOME MEDS: see med rec    6/14/23- up and ambulated with PT, has a lot of knee pain. No dizziness/ lightheadedness    REVIEW OF SYSTEMS: all systems reviewed and negative unless mentioned above    Vital Signs Last 24 Hrs  T(C): 37 (14 Jun 2023 08:00), Max: 37 (14 Jun 2023 08:00)  T(F): 98.6 (14 Jun 2023 08:00), Max: 98.6 (14 Jun 2023 08:00)  HR: 89 (14 Jun 2023 10:10) (55 - 91)  BP: 161/83 (14 Jun 2023 10:10) (77/54 - 180/81)  BP(mean): --  RR: 18 (14 Jun 2023 08:00) (12 - 18)  SpO2: 97% (14 Jun 2023 08:00) (94% - 100%)    Parameters below as of 14 Jun 2023 08:00  Patient On (Oxygen Delivery Method): room air    PHYSICAL EXAM:  GENERAL: Comfortable, no acute distress   HEAD:  Normocephalic, atraumatic  EYES: EOMI, PERRLA  HEENT: Moist mucous membranes  NECK: Supple, No JVD  NERVOUS SYSTEM:  groggy, Motor Strength 5/5 B/L upper and lower extremities  CHEST/LUNG: Clear to auscultation bilaterally  HEART: Regular rate and rhythm  ABDOMEN: Soft, non tender, Nondistended, Bowel sounds present  GENITOURINARY: Voiding, no palpable bladder  EXTREMITIES:   No clubbing, cyanosis, or edema  MUSCULOSKELETAL- left knee ace dsg c/d/i  SKIN-no rash    LABS:                         13.8   12.08 )-----------( 161      ( 14 Jun 2023 06:55 )             39.9     14 Jun 2023 06:55    140    |  110    |  19     ----------------------------<  125    3.3     |  23     |  1.23     Ca    8.4        14 Jun 2023 06:55    CAPILLARY BLOOD GLUCOSE  POCT Blood Glucose.: 119 mg/dL (13 Jun 2023 22:30)  POCT Blood Glucose.: 102 mg/dL (13 Jun 2023 14:51)    MEDICATIONS  (STANDING):  acetaminophen     Tablet .. 975 milliGRAM(s) Oral every 8 hours  celecoxib 200 milliGRAM(s) Oral every 12 hours  doxazosin 1 milliGRAM(s) Oral at bedtime  ferrous    sulfate 325 milliGRAM(s) Oral daily  folic acid 1 milliGRAM(s) Oral daily  lactated ringers. 1000 milliLiter(s) (75 mL/Hr) IV Continuous <Continuous>  metoprolol succinate ER 25 milliGRAM(s) Oral daily  multivitamin 1 Tablet(s) Oral daily  pantoprazole    Tablet 40 milliGRAM(s) Oral once  polyethylene glycol 3350 17 Gram(s) Oral at bedtime  rivaroxaban 10 milliGRAM(s) Oral daily  senna 2 Tablet(s) Oral at bedtime  tranexamic acid 2% Topical Irrigation 1 Application(s) IntraArticular once  valsartan 320 milliGRAM(s) Oral daily    MEDICATIONS  (PRN):  HYDROmorphone  Injectable 0.5 milliGRAM(s) SubCutaneous every 4 hours PRN Severe Pain (7 - 10)  magnesium hydroxide Suspension 30 milliLiter(s) Oral daily PRN Constipation  melatonin 5 milliGRAM(s) Oral at bedtime PRN Insomnia  ondansetron Injectable 4 milliGRAM(s) IV Push every 6 hours PRN Nausea and/or Vomiting  oxyCODONE    IR 5 milliGRAM(s) Oral every 3 hours PRN Mild Pain (1 - 3)  oxyCODONE    IR 10 milliGRAM(s) Oral every 3 hours PRN Moderate Pain (4 - 6)    IMPRESSION: 88 yo male with above pmh a/w:  #OA left knee  #S/P left TKR  Ortho following  PT as tolerated  Pain meds prn  HH stable  Incentive spirometry  Bowel regimen    # HTN  cont bb, Valsartan, hold hctz  BP stable    # BPH  cont doxazosin  voiding    # Vte prophylaxis/ H/O B/L DVT and PE's   AC consult: resume xarelto  Venodynes  INC mobility    #Dispo- home with home PT today      
Orthopedics Post-op Check    POD 0 Total Knee Arthroplasty  Pain is controlled left knee . Pt feeling well. No nausea or vomiting.    Vital Signs Last 24 Hrs  T(C): 36.6 (06-13-23 @ 19:55), Max: 36.8 (06-13-23 @ 11:21)  T(F): 97.9 (06-13-23 @ 19:55), Max: 98.3 (06-13-23 @ 11:21)  HR: 88 (06-13-23 @ 19:55) (55 - 96)  BP: 127/74 (06-13-23 @ 19:55) (77/54 - 180/81)  BP(mean): --  RR: 16 (06-13-23 @ 19:55) (12 - 18)  SpO2: 94% (06-13-23 @ 19:55) (94% - 100%)          Exam: Left knee   NAD AAOx3  Dressing clean and dry  +EHL FHL TA GS  SILT toes 1-5  +DP  Calf Soft NT    A/P:  Stable POD 0 Left Total Knee Arthroplasty  -Analgesia  -Ppx ABX  -DVT PE ppx  -SCDs  -Incentive spirometry  -OOB PT
POD 1 Total Knee Arthroplasty  Pain is controlled left knee . Pt feeling well. No nausea or vomiting.  No acute events overnight    Vital Signs Last 24 Hrs  T(C): 36.9 (14 Jun 2023 04:18), Max: 36.9 (14 Jun 2023 04:18)  T(F): 98.4 (14 Jun 2023 04:18), Max: 98.4 (14 Jun 2023 04:18)  HR: 67 (14 Jun 2023 04:18) (55 - 96)  BP: 151/79 (14 Jun 2023 04:18) (77/54 - 180/81)  BP(mean): --  RR: 18 (14 Jun 2023 04:18) (12 - 18)  SpO2: 95% (14 Jun 2023 04:18) (94% - 100%)    Parameters below as of 14 Jun 2023 04:18  Patient On (Oxygen Delivery Method): room air        LABS:                        13.8   6.70  )-----------( 171      ( 13 Jun 2023 15:14 )             40.2     13 Jun 2023 15:14    141    |  110    |  20     ----------------------------<  108    3.4     |  26     |  1.37     Ca    8.4        13 Jun 2023 15:14        Exam: Left knee   NAD AAOx3  Dressing clean and dry  +EHL FHL TA GS  SILT toes 1-5  +DP  Calf Soft NT    A/P:  Stable POD 1 Left Total Knee Arthroplasty    -Analgesia  -Ppx ABX  -DVT PE ppx  -SCDs  -Incentive spirometry  -OOB PT  -Plan for DC Home
  HPI:      Patient is a 87y old  Male with PMH of PE, prostate cancer on xarelto 10mg daily held 48 hours prior to surgery  who presents with a chief complaint of left knee pain now s/p  planned left TKR (2023 15:01). Consulted by Dr.Scott Badillo    for VTE prophylaxis, risk stratification, and anticoagulation management.    PAST MEDICAL & SURGICAL HISTORY:  History of atypical nevus      Deviated nasal septum      Rhinitis      Sensorineural hearing loss  bilateral      HTN (hypertension)      History of prostate cancer  seed implant 2004      BPH (Benign Prostatic Hyperplasia)      Tubular adenoma      Diverticulosis  sigmoid      Pulmonary embolism      OA (osteoarthritis)      Ascending aortic aneurysm      H/O sepsis      Dilated aortic root      Tricuspid regurgitation      History of rhinoplasty      History of tonsillectomy      Mastoid disorder      Prostate ca  with seed implantation 2004      S/P cataract surgery  left eye    Interval history  2023:patient seen at bedside on PACU discussed the resumption of anticoagulation patient is on Xarelto 10mg for Previous h/o PE will give one dose of Heparin tonight and will resume xarelto tomorrow patient in agreement with the plan.   2023:patient seen at bedside in [address] [place] [address], discussed the resumption of anticoagulation  Xarelto Patient ambulated with PT denies any concerns possible home today     CrCl:  BMI:27.1    Caprini VTE Risk Score:  AGE RELATED RISK FACTORS                                                       MOBILITY RELATED FACTORS  [ ] Age 41-60 years                                            (1 Point)                  [ ] Bed rest /restricted mobility                             (1 Point)  [ ] Age: 61-74 years                                           (2 Points)                [ ] Plaster cast                                                   (2 Points)  [x ] Age= 75 years                                              (3 Points)                 [ ] Bed bound for more than 72 hours                   (2 Points)    DISEASE RELATED RISK FACTORS                                               GENDER SPECIFIC FACTORS  [ ] Edema in the lower extremities                       (1 Point)           [ ] Pregnancy                                                            (1 Point)  [ ] Varicose veins                                               (1 Point)                  [ ] Post-partum < 6 weeks                                      (1 Point)             [ x] BMI > 25 Kg/m2                                            (1 Point)                  [ ] Hormonal therapy or oral contraception       (1 Point)                 [ ] Sepsis (in the previous month)                        (1 Point)             [ ] History of pregnancy complications                (1Point)  [ ] Pneumonia or serious lung disease                                             [ ] Unexplained or recurrent  (=/>3), premature                                 (In the previous month)                               (1 Point)                birth with toxemia or growth-restricted infant (1 Point)  [ ] Abnormal pulmonary function test            (1 Point)                                   SURGERY RELATED RISK FACTORS  [ ] Acute myocardial infarction                       (1 Point)                  [ ]  Section                                         (1 Point)  [ ] Congestive heart failure (in the previous month) (1 Point)   [ ] Minor surgery   lasting <45 minutes       (1 Point)   [ ] Inflammatory bowel disease                             (1 Point)          [ ] Arthroscopic surgery                                  (2 Points)  [ ] Central venous access                                    (2 Points)            [ ] General surgery lasting >45 minutes      (2 Points)       [ ] Stroke (in the previous month)                  (5 Points)            [ x] Elective major lower extremity arthroplasty (5 Points)                                   [ x ] Malignancy (present or past include skin melanoma                                          but exclude  basal skin cell)    (2 points)                                      TRAUMA RELATED RISK FACTORS                HEMATOLOGY RELATED FACTORS                                  [ ] Fracture of the hip, pelvis, or leg                       (5 Points)  [x ] Prior episodes of VTE                                     (3 Points)          [ ] Acute spinal cord injury (in the previous month)  (5 Points)  [ ] Positive family history for VTE                         (3 Points)       [ ] Paralysis (less than 1 month)                          (5 Points)  [ ] Prothrombin 36529 A                                      (3 Points)         [ ] Multiple Trauma (within 1month)                 (5Points)                                                                                                                                                                [ ] Factor V Leiden                                          (3 Points)                                OTHER RISK FACTORS                          [ ] Lupus anticoagulants                                     (3 Points)                       [ ] BMI > 40                          (1 Point)                                                         [ ] Anticardiolipin antibodies                                (3 Points)                   [ ] Smoking                              (1Point)                                                [ ] High homocysteine in the blood                      (3 Points)                [  ] Diabetes requiring insulin (1point)                         [ ] Other congenital or acquired thrombophilia       (3 Points)          [  ] Chemotherapy                   (1 Point)  [ ] Heparin induced thrombocytopenia                  (3 Points)             [  ] Blood Transfusion                (1 point)                                                                                                             Total Score [  14        ]                                                                                                                                                                                                                                     IMPROVE Bleeding Risk Score:6      Falls Risk:   High ( x )  Mod (  )  Low (  )      FAMILY HISTORY:  FH: bladder cancer (Sibling)    FH: coronary artery disease (Mother)    FH: lymphoma (Father)      Denies any personal or familial history of clotting or bleeding disorders.    Allergies    adhesives (Rash)  No Known Drug Allergies    Intolerances        REVIEW OF SYSTEMS    (  )Fever	     (  )Constipation	(  )SOB				(  )Headache	(  )Dysuria  (  )Chills	     (  )Melena	(  )Dyspnea present on exertion	                    (  )Dizziness                    (  )Polyuria  (  )Nausea	     (  )Hematochezia	(  )Cough			                    (  )Syncope   	(  )Hematuria  (  )Vomiting    (  )Chest Pain	(  )Wheezing			(  )Weakness  (  )Diarrhea     (  )Palpitations	(  )Anorexia			( x )joint pain    All  other review of systems negative: Yes    Vital Signs Last 24 Hrs  T(C): 37 (2023 08:00), Max: 37 (2023 08:00)  T(F): 98.6 (2023 08:00), Max: 98.6 (2023 08:00)  HR: 65 (2023 08:00) (55 - 96)  BP: 177/72 (2023 08:00) (77/54 - 180/81)  BP(mean): --  RR: 18 (2023 08:00) (12 - 18)  SpO2: 97% (2023 08:00) (94% - 100%)  PHYSICAL EXAM:    Constitutional: Appears Well    Neurological: A& O x 3    Skin: Warm    Respiratory and Thorax: normal effort; Breath sounds: normal; No rales/wheezing/rhonchi  	  Cardiovascular: S1, S2, regular, NMBR	    Gastrointestinal: BS + x 4Q, nontender	    Genitourinary:  Bladder nondistended, nontender    Musculoskeletal:   General Right:   no muscle/joint tenderness,   normal tone, no joint swelling,   ROM: full	    General Left:   + muscle/joint tenderness,   normal tone, no joint swelling,   ROM: limited      Knee:    Left: Incision: ; Dressing CDI;       Lower extrems:   Right: no calf tenderness              negative arnie's sign               + pedal pulses    Left:   no calf tenderness              negative arnie's sign               + pedal pulses                        13.8   12.08 )-----------( 161      ( 2023 06:55 )             39.9       06-14    140  |  110<H>  |  19  ----------------------------<  125<H>  3.3<L>   |  23  |  1.23    Ca    8.4<L>      2023 06:55      MEDICATIONS  (STANDING):  acetaminophen     Tablet .. 975 milliGRAM(s) Oral every 8 hours  celecoxib 200 milliGRAM(s) Oral every 12 hours  doxazosin 1 milliGRAM(s) Oral at bedtime  ferrous    sulfate 325 milliGRAM(s) Oral daily  folic acid 1 milliGRAM(s) Oral daily  lactated ringers. 1000 milliLiter(s) (75 mL/Hr) IV Continuous <Continuous>  metoprolol succinate ER 25 milliGRAM(s) Oral daily  multivitamin 1 Tablet(s) Oral daily  pantoprazole    Tablet 40 milliGRAM(s) Oral once  polyethylene glycol 3350 17 Gram(s) Oral at bedtime  potassium chloride    Tablet ER 20 milliEquivalent(s) Oral once  rivaroxaban 10 milliGRAM(s) Oral daily  senna 2 Tablet(s) Oral at bedtime  tranexamic acid 2% Topical Irrigation 1 Application(s) IntraArticular once  valsartan 320 milliGRAM(s) Oral daily          DVT Prophylaxis:  LMWH                   (  )  Heparin SQ           (  )  Coumadin             (  )  Xarelto                  ( x )  Eliquis                   (  )  Venodynes           (x  )  Ambulation          (x  )  UFH                       (  )  Contraindicated  (  )  EC ASPIRIN       (  )

## 2023-06-14 NOTE — DISCHARGE NOTE NURSING/CASE MANAGEMENT/SOCIAL WORK - PATIENT PORTAL LINK FT
You can access the FollowMyHealth Patient Portal offered by [facility] by registering at the following website: [email or web address]. By joining [facility]’s FollowMyHealth portal, you will also be able to view your health information using other applications (apps) compatible with our system.

## 2023-06-15 ENCOUNTER — TRANSCRIPTION ENCOUNTER (OUTPATIENT)
Age: 88
End: 2023-06-15

## 2023-06-19 ENCOUNTER — LABORATORY RESULT (OUTPATIENT)
Age: 88
End: 2023-06-19

## 2023-06-19 LAB — SURGICAL PATHOLOGY STUDY: SIGNIFICANT CHANGE UP

## 2023-06-20 DIAGNOSIS — N28.9 DISORDER OF KIDNEY AND URETER, UNSPECIFIED: ICD-10-CM

## 2023-06-20 DIAGNOSIS — Z80.52 FAMILY HISTORY OF MALIGNANT NEOPLASM OF BLADDER: ICD-10-CM

## 2023-06-20 DIAGNOSIS — N40.0 BENIGN PROSTATIC HYPERPLASIA WITHOUT LOWER URINARY TRACT SYMPTOMS: ICD-10-CM

## 2023-06-20 DIAGNOSIS — I10 ESSENTIAL (PRIMARY) HYPERTENSION: ICD-10-CM

## 2023-06-20 DIAGNOSIS — I07.1 RHEUMATIC TRICUSPID INSUFFICIENCY: ICD-10-CM

## 2023-06-20 DIAGNOSIS — E86.1 HYPOVOLEMIA: ICD-10-CM

## 2023-06-20 DIAGNOSIS — Z80.7 FAMILY HISTORY OF OTHER MALIGNANT NEOPLASMS OF LYMPHOID, HEMATOPOIETIC AND RELATED TISSUES: ICD-10-CM

## 2023-06-20 DIAGNOSIS — Z82.49 FAMILY HISTORY OF ISCHEMIC HEART DISEASE AND OTHER DISEASES OF THE CIRCULATORY SYSTEM: ICD-10-CM

## 2023-06-20 DIAGNOSIS — Z91.09 OTHER ALLERGY STATUS, OTHER THAN TO DRUGS AND BIOLOGICAL SUBSTANCES: ICD-10-CM

## 2023-06-20 DIAGNOSIS — Z79.01 LONG TERM (CURRENT) USE OF ANTICOAGULANTS: ICD-10-CM

## 2023-06-20 DIAGNOSIS — I71.40 ABDOMINAL AORTIC ANEURYSM, WITHOUT RUPTURE, UNSPECIFIED: ICD-10-CM

## 2023-06-20 DIAGNOSIS — Z86.718 PERSONAL HISTORY OF OTHER VENOUS THROMBOSIS AND EMBOLISM: ICD-10-CM

## 2023-06-20 DIAGNOSIS — H90.3 SENSORINEURAL HEARING LOSS, BILATERAL: ICD-10-CM

## 2023-06-20 DIAGNOSIS — D22.9 MELANOCYTIC NEVI, UNSPECIFIED: ICD-10-CM

## 2023-06-20 DIAGNOSIS — Z85.46 PERSONAL HISTORY OF MALIGNANT NEOPLASM OF PROSTATE: ICD-10-CM

## 2023-06-20 DIAGNOSIS — M21.162 VARUS DEFORMITY, NOT ELSEWHERE CLASSIFIED, LEFT KNEE: ICD-10-CM

## 2023-06-20 DIAGNOSIS — J34.2 DEVIATED NASAL SEPTUM: ICD-10-CM

## 2023-06-20 DIAGNOSIS — M17.12 UNILATERAL PRIMARY OSTEOARTHRITIS, LEFT KNEE: ICD-10-CM

## 2023-06-23 ENCOUNTER — TRANSCRIPTION ENCOUNTER (OUTPATIENT)
Age: 88
End: 2023-06-23

## 2023-07-12 ENCOUNTER — APPOINTMENT (OUTPATIENT)
Dept: ORTHOPEDIC SURGERY | Facility: CLINIC | Age: 88
End: 2023-07-12
Payer: MEDICARE

## 2023-07-12 VITALS — HEIGHT: 64 IN | WEIGHT: 150 LBS | BODY MASS INDEX: 25.61 KG/M2

## 2023-07-12 PROBLEM — I26.99 OTHER PULMONARY EMBOLISM WITHOUT ACUTE COR PULMONALE: Chronic | Status: ACTIVE | Noted: 2023-05-31

## 2023-07-12 PROBLEM — I07.1 RHEUMATIC TRICUSPID INSUFFICIENCY: Chronic | Status: ACTIVE | Noted: 2023-05-31

## 2023-07-12 PROBLEM — M19.90 UNSPECIFIED OSTEOARTHRITIS, UNSPECIFIED SITE: Chronic | Status: ACTIVE | Noted: 2023-05-31

## 2023-07-12 PROBLEM — Z86.19 PERSONAL HISTORY OF OTHER INFECTIOUS AND PARASITIC DISEASES: Chronic | Status: ACTIVE | Noted: 2023-05-31

## 2023-07-12 PROBLEM — I77.810 THORACIC AORTIC ECTASIA: Chronic | Status: ACTIVE | Noted: 2023-05-31

## 2023-07-12 PROBLEM — I71.21 ANEURYSM OF THE ASCENDING AORTA, WITHOUT RUPTURE: Chronic | Status: ACTIVE | Noted: 2023-05-31

## 2023-07-12 PROCEDURE — 73564 X-RAY EXAM KNEE 4 OR MORE: CPT | Mod: LT

## 2023-07-12 PROCEDURE — 99024 POSTOP FOLLOW-UP VISIT: CPT

## 2023-07-14 ENCOUNTER — TRANSCRIPTION ENCOUNTER (OUTPATIENT)
Age: 88
End: 2023-07-14

## 2023-07-16 NOTE — HISTORY OF PRESENT ILLNESS
[5] : 5 [2] : 2 [Dull/Aching] : dull/aching [Intermittent] : intermittent [Rest] : rest [Standing] : standing [] : no [FreeTextEntry1] : Lt. Knee [de-identified] : 6/13/23 [FreeTextEntry5] : 88 y/o M presents for PO #1 eval. of Lt. Knee. s/p Lt. TKR on DOS noted above. Pt reports recovery is going well with some pain persisting.  [de-identified] : Dr. Badillo [de-identified] : 6/13/23 [de-identified] : 6/13/23 [de-identified] : Lt. TKR

## 2023-07-16 NOTE — ASSESSMENT
[FreeTextEntry1] : The patient s/p 4 weeks from a left knee TKA on 6/13/23. The patient denies fever, chills, CP, SOB. The patient denies drainage or discharge from their incision. The patient is doing well postoperatively. He denies new trauma. He denies paresthesias or numbness. The patient is able to complete simple ADLs and use stairs as tolerated. He is using tylenol with control of his pain. He is completing home PT at this time. \par \par Left knee exam: The patient is in no apparent distress. Neurovascularly intact. Sensation to left lower extremity. 2+ pulses to posterior tibialis. Operative incision is clean, dry, and intact. Steri-strips removed. No active drainage or discharge. No signs of infection or DVT. Tender to palpation to medial joint line. Ranging  5-95. - Crepitus.  - pain with varus/valgus stress. - anterior/posterior drawer.\par \par X-rays done in the office today of the left knee 4 views shows left total knee prosthesis in normal alignment in good position with no signs of loosening or wear.  There are no obvious tumors masses or calcifications seen.\par \par Plan at this point is to continue physical therapy to regain range of motion.  I will see him back in the office in 6 weeks.\par \par

## 2023-08-10 NOTE — PROGRESS NOTE ADULT - ASSESSMENT
84 y/o male with h/o HTN, BPH, diverticulosis, prostate CA, tubular adenoma was admitted on 6/22 for increased weakness and recurrent  falls. Pts reports he fell at 6pm the night PTA and again the next day while he was trying to urinate in the bathroom.  He denies LOC, but reports chills, extreme weakness and fatigue for a few  days, also assoc bifrontal mild headache, 1-2 loose stools and decreased appetite/PO intake. Pt saw Dr. Nik Zimmerman earlier on the day of admission and was prescribed cefuroxime for chest congestion and  a sinus infection. In ER he was noted febrile with a rectal temp 103F. He received cefepime and Vanco 1gm, then ceftriaxone and azithromycin.    1. RUL pneumonia. Syncopal episode. B/L PE. CRF stage 3  -dyspnea is improved  -leukocytosis resolved  -renal function improved  - BC x 2 reviewed  -on cefepime 1 gm IV 12h # 6  -tolerating abx well so far; no side effects noted  -continue abx coverage with cefepime for now  -respiratory care  -hydration  -monitor temps  -f/u CBC  -supportive care  2. Other issues:   -care per medicine Bill For Surgical Tray: no Expected Date Of Service: 08/10/2023 Billing Type: Third-Party Bill Lab Facility: [id number]

## 2023-08-21 NOTE — ED PROVIDER NOTE - RELIEVING FACTORS
Prior to immunization administration, verified patients identity using patient s name and date of birth. Please see Immunization Activity for additional information.     Screening Questionnaire for Adult Immunization    Are you sick today?   No   Do you have allergies to medications, food, a vaccine component or latex?   No   Have you ever had a serious reaction after receiving a vaccination?   No   Do you have a long-term health problem with heart, lung, kidney, or metabolic disease (e.g., diabetes), asthma, a blood disorder, no spleen, complement component deficiency, a cochlear implant, or a spinal fluid leak?  Are you on long-term aspirin therapy?   Yes   Do you have cancer, leukemia, HIV/AIDS, or any other immune system problem?   No   Do you have a parent, brother, or sister with an immune system problem?   No   In the past 3 months, have you taken medications that affect  your immune system, such as prednisone, other steroids, or anticancer drugs; drugs for the treatment of rheumatoid arthritis, Crohn s disease, or psoriasis; or have you had radiation treatments?   No   Have you had a seizure, or a brain or other nervous system problem?   No   During the past year, have you received a transfusion of blood or blood    products, or been given immune (gamma) globulin or antiviral drug?   No   For women: Are you pregnant or is there a chance you could become       pregnant during the next month?   No   Have you received any vaccinations in the past 4 weeks?   No     Immunization questionnaire was positive for at least one answer. Ok per Dr. Saals for prevnar 20.      Patient instructed to remain in clinic for 15 minutes afterwards, and to report any adverse reactions.     Screening performed by Roberto Ramos CMA on 8/21/2023 at 3:06 PM.        
none

## 2023-09-06 ENCOUNTER — APPOINTMENT (OUTPATIENT)
Dept: ORTHOPEDIC SURGERY | Facility: CLINIC | Age: 88
End: 2023-09-06
Payer: MEDICARE

## 2023-09-06 VITALS — BODY MASS INDEX: 25.61 KG/M2 | WEIGHT: 150 LBS | HEIGHT: 64 IN

## 2023-09-06 DIAGNOSIS — M17.12 UNILATERAL PRIMARY OSTEOARTHRITIS, LEFT KNEE: ICD-10-CM

## 2023-09-06 PROCEDURE — 99024 POSTOP FOLLOW-UP VISIT: CPT

## 2023-09-06 NOTE — HISTORY OF PRESENT ILLNESS
[4] : 4 [0] : 0 [Dull/Aching] : dull/aching [Localized] : localized [Retired] : Work status: retired [] : This patient has had an injection before: no [FreeTextEntry1] : L Knee [FreeTextEntry3] : 6/13/23 [FreeTextEntry5] : Pt is a 86 y/o M here to follow up on his L TKA done on 6/13/23. Pt states recovery is going well with only some pain.  [de-identified] : HEP  [de-identified] : 6/13/23 [de-identified] :   [de-identified] : L TKA

## 2023-09-06 NOTE — ASSESSMENT
[FreeTextEntry1] : The patient s/p 3 months from a left knee TKA on 6/13/23. The patient is doing well postoperatively. He denies new trauma. He denies paresthesias or numbness. The patient is able to complete simple ADLs and use stairs as tolerated. He is using tylenol with control of his pain. He is completing home PT at this time.   Left knee exam: The patient is in no apparent distress. Neurovascularly intact. Sensation to left lower extremity. 2+ pulses to posterior tibialis. Operative incision is well healed. No active drainage or discharge. No signs of infection or DVT. Tender to palpation to medial joint line. Ranging  5-118. - Crepitus.  - pain with varus/valgus stress. - anterior/posterior drawer  The patient is doing very well. He will follow up as needed.

## 2023-12-26 ENCOUNTER — RX RENEWAL (OUTPATIENT)
Age: 88
End: 2023-12-26

## 2024-02-09 ENCOUNTER — NON-APPOINTMENT (OUTPATIENT)
Age: 89
End: 2024-02-09

## 2024-02-09 ENCOUNTER — APPOINTMENT (OUTPATIENT)
Dept: CARDIOLOGY | Facility: CLINIC | Age: 89
End: 2024-02-09
Payer: MEDICARE

## 2024-02-09 VITALS
BODY MASS INDEX: 27.46 KG/M2 | SYSTOLIC BLOOD PRESSURE: 142 MMHG | WEIGHT: 160 LBS | OXYGEN SATURATION: 94 % | DIASTOLIC BLOOD PRESSURE: 72 MMHG | HEART RATE: 90 BPM

## 2024-02-09 DIAGNOSIS — I25.10 ATHEROSCLEROTIC HEART DISEASE OF NATIVE CORONARY ARTERY W/OUT ANGINA PECTORIS: ICD-10-CM

## 2024-02-09 DIAGNOSIS — I25.84 ATHEROSCLEROTIC HEART DISEASE OF NATIVE CORONARY ARTERY W/OUT ANGINA PECTORIS: ICD-10-CM

## 2024-02-09 DIAGNOSIS — I10 ESSENTIAL (PRIMARY) HYPERTENSION: ICD-10-CM

## 2024-02-09 DIAGNOSIS — I71.20 THORACIC AORTIC ANEURYSM, WITHOUT RUPTURE, UNSPECIFIED: ICD-10-CM

## 2024-02-09 PROCEDURE — 99214 OFFICE O/P EST MOD 30 MIN: CPT

## 2024-02-09 PROCEDURE — G2211 COMPLEX E/M VISIT ADD ON: CPT

## 2024-02-09 PROCEDURE — 93000 ELECTROCARDIOGRAM COMPLETE: CPT

## 2024-02-09 NOTE — HISTORY OF PRESENT ILLNESS
[FreeTextEntry1] : Aime Mariee is an 88-year-old man with a history of hypertension, ascending aortic aneurysm, hospitalization for sepsis + pneumonia + pulmonary embolism (June 2019), BPH, diverticulosis, and prostate cancer, who returns for cardiac examination -I last saw him in May 2023 prior to knee replacement surgery. He describes mild COVID-19 infection several months ago but otherwise has been well; no new cardiac symptoms offered or elicited.  No angina or dyspnea.  * This visit was conducted as part of ongoing, longitudinal medical care for patient's chronic medical diagnoses and other issues.

## 2024-02-09 NOTE — DISCUSSION/SUMMARY
[With Me] : with me [___ Year(s)] : in [unfilled] year(s) [FreeTextEntry1] :  Hypertension: Fair control -- modest elevation today but lower with PCP 2 weeks ago; continue valsartan-HCTZ and metoprolol.  Thoracic aortic aneurysm: Mild, stable.  Coronary artery calcifications:  Coronary artery calcifications but no angina and normal nuclear perfusion SPECT < 1 year ago. He reports low cholesterol (total <150, LDL<100).

## 2024-02-09 NOTE — PHYSICAL EXAM
[No Acute Distress] : no acute distress [Normal S1, S2] : normal S1, S2 [Clear Lung Fields] : clear lung fields [No Edema] : no edema [Normal Gait] : normal gait [Alert and Oriented] : alert and oriented

## 2024-02-09 NOTE — CARDIOLOGY SUMMARY
[de-identified] : 2/9/2024. Sinus Rhythm.  Voltage criteria for LVH.  Poor precordial R wave progression. [de-identified] : 6-1-2023: Normal SPECT myocardial perfusion scan with no evidence of reversible or fixed perfusion defects (Regadenoson stress). LVEF 70%. No regional wall motion abnormalities.   [de-identified] : 5/18/2023: Technically difficult image quality. Left ventricular systolic function is normal with ejection fraction 55%. Mild diastolic dysfunction. Mild dilation of the aortic root (4.0 cm at the sinuses of Valsalva) and visualized section of proximal ascending aorta (4.1 cm). Aortic valve sclerosis. Mild aortic regurgitation. Mild pulmonic regurgitation.

## 2024-02-09 NOTE — REVIEW OF SYSTEMS
[Under Stress] : under stress [Weight Gain (___ Lbs)] : [unfilled] ~Ulb weight gain [SOB] : no shortness of breath [Chest Discomfort] : no chest discomfort [Syncope] : no syncope

## 2024-02-20 NOTE — ED ADULT NURSE NOTE - ALCOHOL PRE SCREEN (AUDIT - C)
Device transmission reviewed. Device demonstrated appropriate function.       Electronically Signed by: Yao Jj M.D.    2/20/2024  3:00 PM    
Statement Selected

## 2024-03-27 ENCOUNTER — RX RENEWAL (OUTPATIENT)
Age: 89
End: 2024-03-27

## 2024-03-27 RX ORDER — VALSARTAN AND HYDROCHLOROTHIAZIDE 320; 25 MG/1; MG/1
320-25 TABLET, FILM COATED ORAL
Qty: 90 | Refills: 2 | Status: ACTIVE | COMMUNITY
Start: 2020-03-19 | End: 1900-01-01